# Patient Record
Sex: FEMALE | Race: BLACK OR AFRICAN AMERICAN | Employment: UNEMPLOYED | URBAN - METROPOLITAN AREA
[De-identification: names, ages, dates, MRNs, and addresses within clinical notes are randomized per-mention and may not be internally consistent; named-entity substitution may affect disease eponyms.]

---

## 2017-01-04 ENCOUNTER — OFFICE VISIT (OUTPATIENT)
Dept: INTERNAL MEDICINE CLINIC | Age: 57
End: 2017-01-04

## 2017-01-04 VITALS
WEIGHT: 190 LBS | RESPIRATION RATE: 16 BRPM | HEIGHT: 70 IN | HEART RATE: 84 BPM | TEMPERATURE: 97.9 F | DIASTOLIC BLOOD PRESSURE: 84 MMHG | SYSTOLIC BLOOD PRESSURE: 140 MMHG | BODY MASS INDEX: 27.2 KG/M2 | OXYGEN SATURATION: 94 %

## 2017-01-04 DIAGNOSIS — R60.9 PERIPHERAL EDEMA: ICD-10-CM

## 2017-01-04 DIAGNOSIS — L93.0 DISCOID LUPUS: ICD-10-CM

## 2017-01-04 DIAGNOSIS — M54.16 LUMBAR RADICULOPATHY, ACUTE: ICD-10-CM

## 2017-01-04 DIAGNOSIS — L03.116 CELLULITIS OF LEFT LOWER EXTREMITY: Primary | ICD-10-CM

## 2017-01-04 NOTE — MR AVS SNAPSHOT
Visit Information Date & Time Provider Department Dept. Phone Encounter #  
 1/4/2017  3:15 PM Aldo Sellers MD [address] [facility] [phone] [id number] Follow-up Instructions Return in about 4 weeks (around 2/1/2017). Follow-up and Disposition History Upcoming Health Maintenance Date Due INFLUENZA AGE 9 TO ADULT 8/1/2016 PAP AKA CERVICAL CYTOLOGY 10/16/2016 FOBT Q 1 YEAR AGE 50-75 2/2/2017 BREAST CANCER SCRN MAMMOGRAM 5/2/2018 DTaP/Tdap/Td series (2 - Td) 11/17/2025 Allergies as of 1/4/2017  Review Complete On: 1/4/2017 By: Aldo Sellers MD  
 No Known Allergies Current Immunizations  Never Reviewed Name Date Tdap 11/17/2015 Not reviewed this visit You Were Diagnosed With   
  
 Codes Comments Cellulitis of left lower extremity    -  Primary ICD-10-CM: L03.116 ICD-9-CM: 682.6 Lumbar radiculopathy, acute     ICD-10-CM: M54.16 
ICD-9-CM: 724.4 Peripheral edema     ICD-10-CM: R60.9 ICD-9-CM: 768. 3 Discoid lupus     ICD-10-CM: L93.0 ICD-9-CM: 695.4 Vitals BP Pulse Temp Resp Height(growth percentile) Weight(growth percentile) 140/84 84 97.9 °F (36.6 °C) (Oral) 16 5' 10\" (1.778 m) 190 lb (86.2 kg) SpO2 BMI OB Status Smoking Status 94% 27.26 kg/m2 Postmenopausal Former Smoker Vitals History BMI and BSA Data Body Mass Index Body Surface Area  
 27.26 kg/m 2 2.06 m 2 Preferred Pharmacy Pharmacy Name Phone [facility] [address] , [address] [place] [address] [phone] Your Updated Medication List  
  
   
This list is accurate as of: 1/4/17  4:52 PM.  Always use your most recent med list. amLODIPine 5 mg tablet Commonly known as:  Arlyss Nevin Take 1 Tab by mouth daily. amoxicillin-clavulanate 875-125 mg per tablet Commonly known as:  AUGMENTIN Take 1 Tab by mouth every twelve (12) hours. bumetanide 1 mg tablet Commonly known as:  Buelah Bake Take 1 Tab by mouth daily. clindamycin 300 mg capsule Commonly known as:  CLEOCIN Take 1 Cap by mouth three (3) times daily. cloNIDine HCl 0.1 mg tablet Commonly known as:  CATAPRES  
TAKE ONE TABLET BY MOUTH TWICE DAILY  
  
 cyclobenzaprine 10 mg tablet Commonly known as:  FLEXERIL Take 1 Tab by mouth two (2) times a day. fluticasone 50 mcg/actuation nasal spray Commonly known as:  Dylan Metro 2 Sprays by Both Nostrils route daily. gabapentin 100 mg capsule Commonly known as:  NEURONTIN Take 1 Cap by mouth three (3) times daily. hydroxychloroquine 200 mg tablet Commonly known as:  PLAQUENIL  
  
 hydrOXYzine HCl 50 mg tablet Commonly known as:  ATARAX Take 1 Tab by mouth every six (6) hours as needed for Itching. K-DUR 10 mEq tablet Generic drug:  potassium chloride Take  by mouth daily. loratadine 10 mg tablet Commonly known as:  Kia Chiquis Take 1 tablet by mouth daily. losartan 100 mg tablet Commonly known as:  COZAAR  
TAKE ONE TABLET BY MOUTH ONCE DAILY  
  
 naproxen 500 mg tablet Commonly known as:  NAPROSYN Take 1 Tab by mouth two (2) times daily (with meals). omeprazole 20 mg capsule Commonly known as:  PRILOSEC  
TAKE ONE CAPSULE BY MOUTH ONCE DAILY  
  
 VITAMIN D2 50,000 unit capsule Generic drug:  ergocalciferol Take 50,000 Units by mouth. Follow-up Instructions Return in about 4 weeks (around 2/1/2017). Patient Instructions Zilico Activation Thank you for requesting access to [employer]ilico. Please follow the instructions below to securely access and download your online medical record. Zilico allows you to send messages to your doctor, view your test results, renew your prescriptions, schedule appointments, and more. How Do I Sign Up? 1. In your internet browser, go to [email or web address] 
 2. Click on the First Time User? Click Here link in the Sign In box. You will be redirect to the New Member Sign Up page. 3. Enter your Xeebel Access Code exactly as it appears below. You will not need to use this code after youve completed the sign-up process. If you do not sign up before the expiration date, you must request a new code. MyChart Access Code: Activation code not generated Current Xeebel Status: Patient Declined (This is the date your MyChart access code will ) 4. Enter the last four digits of your Social Security Number (xxxx) and Date of Birth (mm/dd/yyyy) as indicated and click Submit. You will be taken to the next sign-up page. 5. Create a GlobalMotiont ID. This will be your Xeebel login ID and cannot be changed, so think of one that is secure and easy to remember. 6. Create a Xeebel password. You can change your password at any time. 7. Enter your Password Reset Question and Answer. This can be used at a later time if you forget your password. 8. Enter your e-mail address. You will receive e-mail notification when new information is available in [address] 9. Click Sign Up. You can now view and download portions of your medical record. 10. Click the Download Summary menu link to download a portable copy of your medical information. Additional Information If you have questions, please visit the Frequently Asked Questions section of the Xeebel website at [email or web address] Remember, Xeebel is NOT to be used for urgent needs. For medical emergencies, dial 911. Please provide this summary of care documentation to your next provider. Your primary care clinician is listed as Eduard Kingsley. If you have any questions after today's visit, please call [phone].

## 2017-01-04 NOTE — PATIENT INSTRUCTIONS
[email or web address] Activation    Thank you for requesting access to [employer]andbase. Please follow the instructions below to securely access and download your online medical record. Demandbase allows you to send messages to your doctor, view your test results, renew your prescriptions, schedule appointments, and more. How Do I Sign Up? 1. In your internet browser, go to [email or web address]  2. Click on the First Time User? Click Here link in the Sign In box. You will be redirect to the New Member Sign Up page. 3. Enter your Demandbase Access Code exactly as it appears below. You will not need to use this code after youve completed the sign-up process. If you do not sign up before the expiration date, you must request a new code. Demandbase Access Code: Activation code not generated  Current Demandbase Status: Patient Declined (This is the date your Demandbase access code will )    4. Enter the last four digits of your Social Security Number (xxxx) and Date of Birth (mm/dd/yyyy) as indicated and click Submit. You will be taken to the next sign-up page. 5. Create a Demandbase ID. This will be your Demandbase login ID and cannot be changed, so think of one that is secure and easy to remember. 6. Create a Demandbase password. You can change your password at any time. 7. Enter your Password Reset Question and Answer. This can be used at a later time if you forget your password. 8. Enter your e-mail address. You will receive e-mail notification when new information is available in [address] 9. Click Sign Up. You can now view and download portions of your medical record. 10. Click the Download Summary menu link to download a portable copy of your medical information. Additional Information    If you have questions, please visit the Frequently Asked Questions section of the Demandbase website at [email or web address] Remember, Demandbase is NOT to be used for urgent needs. For medical emergencies, dial 911.

## 2017-01-04 NOTE — PROGRESS NOTES
Checo Thorne is a 64 y.o. female and presents with Leg Swelling (bilateral)    Subjective:  She still has an occasional numbness in her rt. lower leg. she has been placed on multiple medications    She still has some pains involving the lt. lower leg with associated swelling reported to decrease some  She had her MRI of the lower spine that revealed a disc protrution at l4l5. And multilevel spinal stenosis    She has been treated for a cellulitis and is on medication    Hypertension Review:  The patient has hypertension  Diet and Lifestyle: generally follows a  low sodium diet, exercises sporadically  Home BP Monitoring: is not measured at home. Pertinent ROS: taking medications as instructed, no medication side effects noted, no TIA's, no chest pain on exertion, no dyspnea on exertion, no swelling of ankles. .    Review of Systems  Constitutional: negative for fevers, chills, anorexia and weight loss  Eyes:   negative for visual disturbance and irritation  ENT:   negative for tinnitus,sore throat,nasal congestion,ear pains. hoarseness  Respiratory:   cough,   CV:   negative for chest pain, palpitations, lower extremity edema  GI:   negative for nausea, vomiting, diarrhea, abdominal pain,melena  Endo:               negative for polyuria,polydipsia,polyphagia,heat intolerance  Genitourinary: negative for frequency, dysuria and hematuria  Integument:  negative for rash and pruritus  Hematologic:  negative for easy bruising and gum/nose bleeding  Musculoskel:  myalgias, arthralgias, back pain, , joint pain  Neurological:  negative for headaches, dizziness, vertigo, memory problems and gait   Behavl/Psych: negative for feelings of anxiety, depression, mood changes    Past Medical History   Diagnosis Date    Anemia, unspecified 2/21/2011    Arthritis, Degenerative,Knee 2/21/2011    Degenerative Disc Disease 2/21/2011    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 No pertinent past surgical history. Social History     Social History    Marital status: SINGLE     Spouse name: N/A    Number of children: N/A    Years of education: N/A     Social History Main Topics    Smoking status: Former Smoker    Smokeless tobacco: Never Used    Alcohol use 0.5 oz/week     1 Glasses of wine per week      Comment: SOCIALLY    Drug use: No    Sexual activity: No     Other Topics Concern    None     Social History Narrative     Family History   Problem Relation Age of Onset    Diabetes Mother     Hypertension Mother     Hypertension Father     Cancer Father      Current Outpatient Prescriptions   Medication Sig Dispense Refill    gabapentin (NEURONTIN) 100 mg capsule Take 1 Cap by mouth three (3) times daily. 90 Cap 3    fluticasone (FLONASE) 50 mcg/actuation nasal spray 2 Sprays by Both Nostrils route daily. 1 Bottle 12    amLODIPine (NORVASC) 5 mg tablet Take 1 Tab by mouth daily. 30 Tab 12    bumetanide (BUMEX) 1 mg tablet Take 1 Tab by mouth daily. 30 Tab 12    omeprazole (PRILOSEC) 20 mg capsule TAKE ONE CAPSULE BY MOUTH ONCE DAILY 30 Cap 6    naproxen (NAPROSYN) 500 mg tablet Take 1 Tab by mouth two (2) times daily (with meals). 60 Tab 3    losartan (COZAAR) 100 mg tablet TAKE ONE TABLET BY MOUTH ONCE DAILY 30 Tab 12    cloNIDine HCl (CATAPRES) 0.1 mg tablet TAKE ONE TABLET BY MOUTH TWICE DAILY 60 Tab 12    cyclobenzaprine (FLEXERIL) 10 mg tablet Take 1 Tab by mouth two (2) times a day. 60 Tab 12    ergocalciferol (VITAMIN D2) 50,000 unit capsule Take 50,000 Units by mouth.  hydroxychloroquine (PLAQUENIL) 200 mg tablet       loratadine (CLARITIN) 10 mg tablet Take 1 tablet by mouth daily. 30 tablet 12    potassium chloride (K-DUR) 10 mEq tablet Take  by mouth daily.       clindamycin (CLEOCIN) 300 mg capsule Take 1 Cap by mouth three (3) times daily. 21 Cap 0    amoxicillin-clavulanate (AUGMENTIN) 875-125 mg per tablet Take 1 Tab by mouth every twelve (12) hours. 14 Tab 4    hydrOXYzine (ATARAX) 50 mg tablet Take 1 Tab by mouth every six (6) hours as needed for Itching. 20 Tab 1     No Known Allergies    Objective:  Visit Vitals    /84    Pulse 84    Temp 97.9 °F (36.6 °C) (Oral)    Resp 16    Ht 5' 10\" (1.778 m)    Wt 190 lb (86.2 kg)    SpO2 94%    BMI 27.26 kg/m2     Physical Exam:   General appearance - alert, well appearing, and in mild distress  Mental status - alert, oriented to person, place, and time  EYE-KASEY, EOMI, corneas normal, no foreign bodies  ENT-ENT exam normal, no neck nodes or sinus tenderness  Nose - normal and patent, no erythema, discharge or polyps  Mouth - mucous membranes moist, pharynx normal without lesions  Neck - supple, no significant adenopathy   Chest - clear to auscultation, no wheezes, rales or rhonchi, symmetric air entry   Heart - normal rate, regular rhythm, normal S1, S2, no murmurs, rubs, clicks or gallops   Abdomen - soft, nontender, nondistended, no masses or organomegaly  Lymph- no adenopathy palpable  Ext-peripheral pulses normal, no pedal edema, no clubbing or cyanosis  Skin-Warm and dry. no hyperpigmentation, vitiligo, or suspicious lesions  Neuro -alert, oriented, normal speech, no focal findings or movement disorder noted  Neck-normal C-spine, no tenderness, full ROM without pain  Rt. lower leg hyperpigmentatioin,2+edema with erythema noted  Rt.knee-medial joint line tenderness noted  Back-tenderness lower lumbar spine and sacral spine      Results for orders placed or performed in visit on 74/29/25   METABOLIC PANEL, COMPREHENSIVE   Result Value Ref Range    Glucose 93 65 - 99 mg/dL    BUN 6 6 - 24 mg/dL    Creatinine 0.76 0.57 - 1.00 mg/dL    GFR est non-AA 88 >59 mL/min/1.73    GFR est  >59 mL/min/1.73    BUN/Creatinine ratio 8 (L) 9 - 23    Sodium 140 136 - 144 mmol/L    Potassium 3.8 3.5 - 5.2 mmol/L    Chloride 97 97 - 106 mmol/L    CO2 29 18 - 29 mmol/L    Calcium 9.0 8.7 - 10.2 mg/dL    Protein, total 9.1 (H) 6.0 - 8.5 g/dL    Albumin 3.8 3.5 - 5.5 g/dL    GLOBULIN, TOTAL 5.3 (H) 1.5 - 4.5 g/dL    A-G Ratio 0.7 (L) 1.1 - 2.5    Bilirubin, total 0.4 0.0 - 1.2 mg/dL    Alk. phosphatase 148 (H) 39 - 117 IU/L    AST 19 0 - 40 IU/L    ALT 12 0 - 32 IU/L   CBC W/O DIFF   Result Value Ref Range    WBC 4.8 3.4 - 10.8 x10E3/uL    RBC 3.63 (L) 3.77 - 5.28 x10E6/uL    HGB 10.0 (L) 11.1 - 15.9 g/dL    HCT 30.9 (L) 34.0 - 46.6 %    MCV 85 79 - 97 fL    MCH 27.5 26.6 - 33.0 pg    MCHC 32.4 31.5 - 35.7 g/dL    RDW 14.8 12.3 - 15.4 %    PLATELET 123 844 - 310 x10E3/uL   SPECIMEN STATUS REPORT   Result Value Ref Range    SPECIMEN STATUS REPORT COMMENT    PROTEIN ELECTROPHORESIS   Result Value Ref Range    Protein, total 9.0 (H) 6.0 - 8.5 g/dL    Albumin 3.2 2.9 - 4.4 g/dL    Alpha-1-globulin 0.3 0.0 - 0.4 g/dL    Alpha-2-globulin 1.0 0.4 - 1.0 g/dL    Beta globulin 1.5 (H) 0.7 - 1.3 g/dL    Gamma globulin 3.1 (H) 0.4 - 1.8 g/dL    M-spike Not Observed Not Observed g/dL    Globulin, total 5.8 (H) 2.2 - 3.9 g/dL    A/G ratio 0.6 (L) 0.7 - 1.7    Please note Comment    IRON PROFILE   Result Value Ref Range    TIBC 241 (L) 250 - 450 ug/dL    UIBC 189 131 - 425 ug/dL    Iron 52 27 - 159 ug/dL    Iron % saturation 22 15 - 55 %   VITAMIN B12 & FOLATE   Result Value Ref Range    Vitamin B12 678 211 - 946 pg/mL    Folate 14.5 >3.0 ng/mL   SPECIMEN STATUS REPORT   Result Value Ref Range    SPECIMEN STATUS REPORT COMMENT        Assessment/Plan:  No diagnosis found. No orders of the defined types were placed in this encounter. lose weight, follow low fat diet, follow low salt diet,Take 81mg aspirin daily  There are no Patient Instructions on file for this visit.    Follow-up Disposition: Not on File        Comments: none                  An After Visit Summary was printed and given to the patient.

## 2017-01-04 NOTE — LETTER
NOTIFICATION RETURN TO WORK / SCHOOL 
 
1/4/2017 4:49 PM 
 
Ms. [address] [facility] [phone] To Whom It May Concern: 
 
Griffin Rodriguez is currently under the care of [address] [place] [address]. She will not be able to return to work. If there are questions or concerns please have the patient contact our office. Sincerely, Ricci Avila MD

## 2017-02-01 ENCOUNTER — OFFICE VISIT (OUTPATIENT)
Dept: INTERNAL MEDICINE CLINIC | Age: 57
End: 2017-02-01

## 2017-02-01 VITALS
BODY MASS INDEX: 27.93 KG/M2 | WEIGHT: 195.1 LBS | RESPIRATION RATE: 14 BRPM | TEMPERATURE: 98.6 F | DIASTOLIC BLOOD PRESSURE: 90 MMHG | HEIGHT: 70 IN | HEART RATE: 84 BPM | SYSTOLIC BLOOD PRESSURE: 138 MMHG

## 2017-02-01 DIAGNOSIS — L93.0 DISCOID LUPUS: ICD-10-CM

## 2017-02-01 DIAGNOSIS — J20.9 ACUTE BRONCHITIS, UNSPECIFIED ORGANISM: ICD-10-CM

## 2017-02-01 DIAGNOSIS — L03.116 CELLULITIS OF LEFT LOWER EXTREMITY: Primary | ICD-10-CM

## 2017-02-01 DIAGNOSIS — M54.16 LUMBAR RADICULOPATHY, ACUTE: ICD-10-CM

## 2017-02-01 RX ORDER — POTASSIUM CHLORIDE 750 MG/1
20 TABLET, EXTENDED RELEASE ORAL DAILY
Qty: 30 TAB | Refills: 12 | Status: SHIPPED | OUTPATIENT
Start: 2017-02-01 | End: 2018-02-16 | Stop reason: SDUPTHER

## 2017-02-01 RX ORDER — AZITHROMYCIN 250 MG/1
TABLET, FILM COATED ORAL
Qty: 6 TAB | Refills: 0 | Status: SHIPPED | OUTPATIENT
Start: 2017-02-01 | End: 2017-03-30 | Stop reason: ALTCHOICE

## 2017-02-01 NOTE — PROGRESS NOTES
1. Have you been to the ER, urgent care clinic since your last visit? Hospitalized since your last visit? No    2. Have you seen or consulted any other health care providers outside of the [address] [place] [address] since your last visit? Include any pap smears or colon screening.  No     Chief Complaint   Patient presents with    Skin Infection     follow up on cellulitis on both legs     Not fasting

## 2017-02-01 NOTE — PROGRESS NOTES
Archie Leon is a 64 y.o. female and presents with Skin Infection (follow up on cellulitis on both legs)    Subjective:  Upper respiratory infection Review:  Archie Leon is a 64 y.o. female who complains of nasal congestion,sore throat, productive cough, myalgias and headache for the past few days, gradually worsening since that time. She denies a history of shortness of breath. Evaluation to date: none. Treatment to date: decongestants, antihistamines, cough suppressants, OTC products. Patient does not smoke cigarettes. Relevant PMH: No pertinent additional PMH. She still has less  pains involving the lt. lower leg   She had her MRI of the lower spine that revealed a disc protrution at l4l5. And multilevel spinal stenosis    She has been treated for a cellulitis and is on medication and has seen some relief    Hypertension Review:  The patient has hypertension  Diet and Lifestyle: generally follows a  low sodium diet, exercises sporadically  Home BP Monitoring: is not measured at home. Pertinent ROS: taking medications as instructed, no medication side effects noted, no TIA's, no chest pain on exertion, no dyspnea on exertion, no swelling of ankles. .    Review of Systems  Constitutional: negative for fevers, chills, anorexia and weight loss  Eyes:   negative for visual disturbance and irritation  ENT:   negative for tinnitus,sore throat,nasal congestion,ear pains. hoarseness  Respiratory:   cough,   CV:   negative for chest pain, palpitations, lower extremity edema  GI:   negative for nausea, vomiting, diarrhea, abdominal pain,melena  Endo:               negative for polyuria,polydipsia,polyphagia,heat intolerance  Genitourinary: negative for frequency, dysuria and hematuria  Integument:  negative for rash and pruritus  Hematologic:  negative for easy bruising and gum/nose bleeding  Musculoskel:  myalgias, arthralgias, back pain, , joint pain  Neurological:  negative for headaches, dizziness, vertigo, memory problems and gait   Behavl/Psych: negative for feelings of anxiety, depression, mood changes    Past Medical History   Diagnosis Date    Anemia, unspecified 2/21/2011    Arthritis, Degenerative,Knee 2/21/2011    Degenerative Disc Disease 2/21/2011    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 No pertinent past surgical history. Social History     Social History    Marital status: SINGLE     Spouse name: N/A    Number of children: N/A    Years of education: N/A     Social History Main Topics    Smoking status: Former Smoker    Smokeless tobacco: Never Used    Alcohol use 0.5 oz/week     1 Glasses of wine per week      Comment: SOCIALLY    Drug use: No    Sexual activity: No     Other Topics Concern    None     Social History Narrative     Family History   Problem Relation Age of Onset    Diabetes Mother     Hypertension Mother     Hypertension Father     Cancer Father      Current Outpatient Prescriptions   Medication Sig Dispense Refill    potassium chloride (K-DUR, KLOR-CON) 10 mEq tablet Take 2 Tabs by mouth daily. 30 Tab 12    azithromycin (ZITHROMAX) 250 mg tablet 2 tabs today and then 1 tab daily for 4 days 6 Tab 0    gabapentin (NEURONTIN) 100 mg capsule Take 1 Cap by mouth three (3) times daily. 90 Cap 3    fluticasone (FLONASE) 50 mcg/actuation nasal spray 2 Sprays by Both Nostrils route daily. 1 Bottle 12    amLODIPine (NORVASC) 5 mg tablet Take 1 Tab by mouth daily. 30 Tab 12    bumetanide (BUMEX) 1 mg tablet Take 1 Tab by mouth daily. 30 Tab 12    naproxen (NAPROSYN) 500 mg tablet Take 1 Tab by mouth two (2) times daily (with meals).  60 Tab 3    losartan (COZAAR) 100 mg tablet TAKE ONE TABLET BY MOUTH ONCE DAILY 30 Tab 12    cloNIDine HCl (CATAPRES) 0.1 mg tablet TAKE ONE TABLET BY MOUTH TWICE DAILY 60 Tab 12    cyclobenzaprine (FLEXERIL) 10 mg tablet Take 1 Tab by mouth two (2) times a day. 60 Tab 12    ergocalciferol (VITAMIN D2) 50,000 unit capsule Take 50,000 Units by mouth.  hydroxychloroquine (PLAQUENIL) 200 mg tablet       loratadine (CLARITIN) 10 mg tablet Take 1 tablet by mouth daily. 30 tablet 12    potassium chloride (K-DUR) 10 mEq tablet Take  by mouth daily. No Known Allergies    Objective:  Visit Vitals    /90 (BP 1 Location: Left arm, BP Patient Position: At rest)    Pulse 84    Temp 98.6 °F (37 °C) (Oral)    Resp 14    Ht 5' 10\" (1.778 m)    Wt 195 lb 1.6 oz (88.5 kg)    BMI 27.99 kg/m2     Physical Exam:   General appearance - alert, well appearing, and in mild distress  Mental status - alert, oriented to person, place, and time  EYE-KASEY, EOMI, corneas normal, no foreign bodies  ENT-ENT exam normal, no neck nodes or sinus tenderness  Nose - normal and patent, no erythema, discharge or polyps  Mouth - mucous membranes moist, pharynx normal without lesions  Neck - supple, no significant adenopathy   Chest - clear to auscultation, no wheezes, rales or rhonchi, symmetric air entry   Heart - normal rate, regular rhythm, normal S1, S2, no murmurs, rubs, clicks or gallops   Abdomen - soft, nontender, nondistended, no masses or organomegaly  Lymph- no adenopathy palpable  Ext-peripheral pulses normal, no pedal edema, no clubbing or cyanosis  Skin-Warm and dry. no hyperpigmentation, vitiligo, or suspicious lesions  Neuro -alert, oriented, normal speech, no focal findings or movement disorder noted  Neck-normal C-spine, no tenderness, full ROM without pain  Rt. lower leg hyperpigmentatioin,2+edema with erythema noted  Rt.knee-medial joint line tenderness noted  Back-tenderness lower lumbar spine and sacral spine      Results for orders placed or performed in visit on 50/30/91   METABOLIC PANEL, COMPREHENSIVE   Result Value Ref Range    Glucose 93 65 - 99 mg/dL    BUN 6 6 - 24 mg/dL    Creatinine 0.76 0.57 - 1.00 mg/dL    GFR est non-AA 88 >59 mL/min/1.73    GFR est  >59 mL/min/1.73    BUN/Creatinine ratio 8 (L) 9 - 23    Sodium 140 136 - 144 mmol/L    Potassium 3.8 3.5 - 5.2 mmol/L    Chloride 97 97 - 106 mmol/L    CO2 29 18 - 29 mmol/L    Calcium 9.0 8.7 - 10.2 mg/dL    Protein, total 9.1 (H) 6.0 - 8.5 g/dL    Albumin 3.8 3.5 - 5.5 g/dL    GLOBULIN, TOTAL 5.3 (H) 1.5 - 4.5 g/dL    A-G Ratio 0.7 (L) 1.1 - 2.5    Bilirubin, total 0.4 0.0 - 1.2 mg/dL    Alk. phosphatase 148 (H) 39 - 117 IU/L    AST (SGOT) 19 0 - 40 IU/L    ALT (SGPT) 12 0 - 32 IU/L   CBC W/O DIFF   Result Value Ref Range    WBC 4.8 3.4 - 10.8 x10E3/uL    RBC 3.63 (L) 3.77 - 5.28 x10E6/uL    HGB 10.0 (L) 11.1 - 15.9 g/dL    HCT 30.9 (L) 34.0 - 46.6 %    MCV 85 79 - 97 fL    MCH 27.5 26.6 - 33.0 pg    MCHC 32.4 31.5 - 35.7 g/dL    RDW 14.8 12.3 - 15.4 %    PLATELET 537 033 - 900 x10E3/uL   SPECIMEN STATUS REPORT   Result Value Ref Range    SPECIMEN STATUS REPORT COMMENT    PROTEIN ELECTROPHORESIS   Result Value Ref Range    Protein, total 9.0 (H) 6.0 - 8.5 g/dL    Albumin 3.2 2.9 - 4.4 g/dL    Alpha-1-globulin 0.3 0.0 - 0.4 g/dL    ALPHA-2 GLOBULIN 1.0 0.4 - 1.0 g/dL    Beta globulin 1.5 (H) 0.7 - 1.3 g/dL    Gamma globulin 3.1 (H) 0.4 - 1.8 g/dL    M-spike Not Observed Not Observed g/dL    Globulin, total 5.8 (H) 2.2 - 3.9 g/dL    A/G ratio 0.6 (L) 0.7 - 1.7    Please note Comment    IRON PROFILE   Result Value Ref Range    TIBC 241 (L) 250 - 450 ug/dL    UIBC 189 131 - 425 ug/dL    Iron 52 27 - 159 ug/dL    Iron % saturation 22 15 - 55 %   VITAMIN B12 & FOLATE   Result Value Ref Range    Vitamin B12 678 211 - 946 pg/mL    Folate 14.5 >3.0 ng/mL   SPECIMEN STATUS REPORT   Result Value Ref Range    SPECIMEN STATUS REPORT COMMENT        Assessment/Plan:    ICD-10-CM ICD-9-CM    1. Cellulitis of left lower extremity L03.116 682.6    2.  Lumbar radiculopathy, acute M54.16 724.4    3. 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 Acute bronchitis, unspecified organism J20.9 466.0      Orders Placed This Encounter    potassium chloride (K-DUR, KLOR-CON) 10 mEq tablet     Sig: Take 2 Tabs by mouth daily. Dispense:  30 Tab     Refill:  12    azithromycin (ZITHROMAX) 250 mg tablet     Si tabs today and then 1 tab daily for 4 days     Dispense:  6 Tab     Refill:  0     lose weight, follow low fat diet, follow low salt diet,Take 81mg aspirin daily  Patient Instructions   MyChart Activation    Thank you for requesting access to Behance. Please follow the instructions below to securely access and download your online medical record. Behance allows you to send messages to your doctor, view your test results, renew your prescriptions, schedule appointments, and more. How Do I Sign Up? 1. In your internet browser, go to [email or web address]  2. Click on the First Time User? Click Here link in the Sign In box. You will be redirect to the New Member Sign Up page. 3. Enter your Behance Access Code exactly as it appears below. You will not need to use this code after youve completed the sign-up process. If you do not sign up before the expiration date, you must request a new code. Behance Access Code: Activation code not generated  Current Behance Status: Patient Declined (This is the date your The Editorialistt access code will )    4. Enter the last four digits of your Social Security Number (xxxx) and Date of Birth (mm/dd/yyyy) as indicated and click Submit. You will be taken to the next sign-up page. 5. Create a Behance ID. This will be your Behance login ID and cannot be changed, so think of one that is secure and easy to remember. 6. Create a Behance password. You can change your password at any time. 7. Enter your Password Reset Question and Answer. This can be used at a later time if you forget your password. 8. Enter your e-mail address.  You will receive e-mail notification when new information is available in [email or web address]. 9. Click Sign Up. You can now view and download portions of your medical record. 10. Click the Download Summary menu link to download a portable copy of your medical information. Additional Information    If you have questions, please visit the Frequently Asked Questions section of the ScriptPad website at [email or web address] Remember, ScriptPad is NOT to be used for urgent needs. For medical emergencies, dial 911. Follow-up Disposition:  Return in about 3 months (around 5/1/2017), or if symptoms worsen or fail to improve. Comments: none                  An After Visit Summary was printed and given to the patient.

## 2017-02-01 NOTE — MR AVS SNAPSHOT
Visit Information Date & Time Provider Department Dept. Phone Encounter #  
 2/1/2017  3:30 PM Anusha Fofana MD [facility] [address] [facility] [phone] [id number] Follow-up Instructions Return in about 3 months (around 5/1/2017), or if symptoms worsen or fail to improve. Follow-up and Disposition History Upcoming Health Maintenance Date Due  
 PAP AKA CERVICAL CYTOLOGY 10/16/2016 FOBT Q 1 YEAR AGE 50-75 2/2/2017 BREAST CANCER SCRN MAMMOGRAM 5/2/2018 DTaP/Tdap/Td series (2 - Td) 11/17/2025 Allergies as of 2/1/2017  Review Complete On: 2/1/2017 By: Anusha Fofana MD  
 No Known Allergies Current Immunizations  Never Reviewed Name Date Influenza Nasal Vaccine 1/3/2017 Tdap 11/17/2015 Not reviewed this visit You Were Diagnosed With   
  
 Codes Comments Cellulitis of left lower extremity    -  Primary ICD-10-CM: L03.116 ICD-9-CM: 682.6 Lumbar radiculopathy, acute     ICD-10-CM: M54.16 
ICD-9-CM: 724.4 Discoid lupus     ICD-10-CM: L93.0 ICD-9-CM: 695.4 Acute bronchitis, unspecified organism     ICD-10-CM: J20.9 ICD-9-CM: 466.0 Vitals BP Pulse Temp Resp Height(growth percentile) Weight(growth percentile) 138/90 (BP 1 Location: Left arm, BP Patient Position: At rest) 84 98.6 °F (37 °C) (Oral) 14 5' 10\" (1.778 m) 195 lb 1.6 oz (88.5 kg) BMI OB Status Smoking Status 27.99 kg/m2 Postmenopausal Former Smoker Vitals History BMI and BSA Data Body Mass Index Body Surface Area  
 27.99 kg/m 2 2.09 m 2 Preferred Pharmacy Pharmacy Name Phone [facility] [address] , [address] [place] [address] [phone] Your Updated Medication List  
  
   
This list is accurate as of: 2/1/17  4:36 PM.  Always use your most recent med list. amLODIPine 5 mg tablet Commonly known as:  Franklyn Presser Take 1 Tab by mouth daily. azithromycin 250 mg tablet Commonly known as:  Stanislaus Sleight 2 tabs today and then 1 tab daily for 4 days  
  
 bumetanide 1 mg tablet Commonly known as:  Arletta Kaia Take 1 Tab by mouth daily. cloNIDine HCl 0.1 mg tablet Commonly known as:  CATAPRES  
TAKE ONE TABLET BY MOUTH TWICE DAILY  
  
 cyclobenzaprine 10 mg tablet Commonly known as:  FLEXERIL Take 1 Tab by mouth two (2) times a day. fluticasone 50 mcg/actuation nasal spray Commonly known as:  Lennice Boss 2 Sprays by Both Nostrils route daily. gabapentin 100 mg capsule Commonly known as:  NEURONTIN Take 1 Cap by mouth three (3) times daily. hydroxychloroquine 200 mg tablet Commonly known as:  PLAQUENIL  
  
 * K-DUR 10 mEq tablet Generic drug:  potassium chloride Take  by mouth daily. * potassium chloride 10 mEq tablet Commonly known as:  K-DUR, KLOR-CON Take 2 Tabs by mouth daily. loratadine 10 mg tablet Commonly known as:  Shannan Elmdale Take 1 tablet by mouth daily. losartan 100 mg tablet Commonly known as:  COZAAR  
TAKE ONE TABLET BY MOUTH ONCE DAILY  
  
 naproxen 500 mg tablet Commonly known as:  NAPROSYN Take 1 Tab by mouth two (2) times daily (with meals). VITAMIN D2 50,000 unit capsule Generic drug:  ergocalciferol Take 50,000 Units by mouth. * Notice: This list has 2 medication(s) that are the same as other medications prescribed for you. Read the directions carefully, and ask your doctor or other care provider to review them with you. Prescriptions Sent to Pharmacy Refills  
 potassium chloride (K-DUR, KLOR-CON) 10 mEq tablet 12 Sig: Take 2 Tabs by mouth daily. Class: Normal  
 Pharmacy: [address], [address] [place] [address] Ph #: [phone] Route: Oral  
 azithromycin (ZITHROMAX) 250 mg tablet 0 Si tabs today and then 1 tab daily for 4 days  Class: Normal  
 Pharmacy: [place] [id number] [facility], [address] [place] [address] #: [phone] Follow-up Instructions Return in about 3 months (around 2017), or if symptoms worsen or fail to improve. Patient Instructions MyChart Activation Thank you for requesting access to scenios. Please follow the instructions below to securely access and download your online medical record. scenios allows you to send messages to your doctor, view your test results, renew your prescriptions, schedule appointments, and more. How Do I Sign Up? 1. In your internet browser, go to [email or web address] 
2. Click on the First Time User? Click Here link in the Sign In box. You will be redirect to the New Member Sign Up page. 3. Enter your scenios Access Code exactly as it appears below. You will not need to use this code after youve completed the sign-up process. If you do not sign up before the expiration date, you must request a new code. scenios Access Code: Activation code not generated Current scenios Status: Patient Declined (This is the date your scenios access code will ) 4. Enter the last four digits of your Social Security Number (xxxx) and Date of Birth (mm/dd/yyyy) as indicated and click Submit. You will be taken to the next sign-up page. 5. Create a scenios ID. This will be your scenios login ID and cannot be changed, so think of one that is secure and easy to remember. 6. Create a scenios password. You can change your password at any time. 7. Enter your Password Reset Question and Answer. This can be used at a later time if you forget your password. 8. Enter your e-mail address. You will receive e-mail notification when new information is available in [address] 9. Click Sign Up. You can now view and download portions of your medical record. 10. Click the Download Summary menu link to download a portable copy of your medical information. Additional Information If you have questions, please visit the Frequently Asked Questions section of the HALSCIONhart website at [email or web address] Remember, OM Latamt is NOT to be used for urgent needs. For medical emergencies, dial 911. Please provide this summary of care documentation to your next provider. Your primary care clinician is listed as Gale Show. If you have any questions after today's visit, please call [phone].

## 2017-02-01 NOTE — PATIENT INSTRUCTIONS
CBRITEharFive Below Activation    Thank you for requesting access to Flashtalking. Please follow the instructions below to securely access and download your online medical record. Flashtalking allows you to send messages to your doctor, view your test results, renew your prescriptions, schedule appointments, and more. How Do I Sign Up? 1. In your internet browser, go to [email or web address]  2. Click on the First Time User? Click Here link in the Sign In box. You will be redirect to the New Member Sign Up page. 3. Enter your Flashtalking Access Code exactly as it appears below. You will not need to use this code after youve completed the sign-up process. If you do not sign up before the expiration date, you must request a new code. Flashtalking Access Code: Activation code not generated  Current Flashtalking Status: Patient Declined (This is the date your Flashtalking access code will )    4. Enter the last four digits of your Social Security Number (xxxx) and Date of Birth (mm/dd/yyyy) as indicated and click Submit. You will be taken to the next sign-up page. 5. Create a Flashtalking ID. This will be your Flashtalking login ID and cannot be changed, so think of one that is secure and easy to remember. 6. Create a Flashtalking password. You can change your password at any time. 7. Enter your Password Reset Question and Answer. This can be used at a later time if you forget your password. 8. Enter your e-mail address. You will receive e-mail notification when new information is available in [address] 9. Click Sign Up. You can now view and download portions of your medical record. 10. Click the Download Summary menu link to download a portable copy of your medical information. Additional Information    If you have questions, please visit the Frequently Asked Questions section of the Flashtalking website at [email or web address] Remember, Flashtalking is NOT to be used for urgent needs. For medical emergencies, dial 911.

## 2017-03-30 ENCOUNTER — OFFICE VISIT (OUTPATIENT)
Dept: INTERNAL MEDICINE CLINIC | Age: 57
End: 2017-03-30

## 2017-03-30 VITALS
BODY MASS INDEX: 27.92 KG/M2 | RESPIRATION RATE: 20 BRPM | HEIGHT: 70 IN | OXYGEN SATURATION: 99 % | TEMPERATURE: 98.6 F | SYSTOLIC BLOOD PRESSURE: 130 MMHG | WEIGHT: 195 LBS | HEART RATE: 88 BPM | DIASTOLIC BLOOD PRESSURE: 70 MMHG

## 2017-03-30 DIAGNOSIS — L93.0 DISCOID LUPUS: ICD-10-CM

## 2017-03-30 DIAGNOSIS — M48.061 SPINAL STENOSIS OF LUMBAR REGION: ICD-10-CM

## 2017-03-30 DIAGNOSIS — M54.16 LUMBAR RADICULOPATHY: Primary | ICD-10-CM

## 2017-03-30 DIAGNOSIS — M50.30 DEGENERATIVE DISC DISEASE, CERVICAL: ICD-10-CM

## 2017-03-30 DIAGNOSIS — M50.30 DEGENERATION OF CERVICAL INTERVERTEBRAL DISC: ICD-10-CM

## 2017-03-30 DIAGNOSIS — I10 ESSENTIAL HYPERTENSION, BENIGN: ICD-10-CM

## 2017-03-30 RX ORDER — LOSARTAN POTASSIUM 100 MG/1
TABLET ORAL
Qty: 30 TAB | Refills: 12 | Status: SHIPPED | OUTPATIENT
Start: 2017-03-30 | End: 2018-04-02 | Stop reason: SDUPTHER

## 2017-03-30 RX ORDER — PREDNISONE 10 MG/1
TABLET ORAL
Qty: 21 TAB | Refills: 0 | Status: SHIPPED | OUTPATIENT
Start: 2017-03-30 | End: 2018-01-29

## 2017-03-30 RX ORDER — GABAPENTIN 300 MG/1
300 CAPSULE ORAL 3 TIMES DAILY
Qty: 90 CAP | Refills: 12 | Status: SHIPPED | OUTPATIENT
Start: 2017-03-30 | End: 2019-06-12

## 2017-03-30 RX ORDER — CYCLOBENZAPRINE HCL 10 MG
10 TABLET ORAL 2 TIMES DAILY
Qty: 60 TAB | Refills: 12 | Status: SHIPPED | OUTPATIENT
Start: 2017-03-30 | End: 2018-04-02 | Stop reason: SDUPTHER

## 2017-03-30 RX ORDER — NAPROXEN 500 MG/1
500 TABLET ORAL 2 TIMES DAILY WITH MEALS
Qty: 60 TAB | Refills: 3 | Status: SHIPPED | OUTPATIENT
Start: 2017-03-30 | End: 2017-08-23 | Stop reason: SDUPTHER

## 2017-03-30 RX ORDER — POTASSIUM CHLORIDE 750 MG/1
20 TABLET, EXTENDED RELEASE ORAL DAILY
Qty: 30 TAB | Refills: 12 | Status: SHIPPED | OUTPATIENT
Start: 2017-03-30 | End: 2018-11-19 | Stop reason: ALTCHOICE

## 2017-03-30 NOTE — PATIENT INSTRUCTIONS
PTS [email or web address] Activation    Thank you for requesting access to Holidu. Please follow the instructions below to securely access and download your online medical record. Holidu allows you to send messages to your doctor, view your test results, renew your prescriptions, schedule appointments, and more. How Do I Sign Up? 1. In your internet browser, go to [email or web address]  2. Click on the First Time User? Click Here link in the Sign In box. You will be redirect to the New Member Sign Up page. 3. Enter your Holidu Access Code exactly as it appears below. You will not need to use this code after youve completed the sign-up process. If you do not sign up before the expiration date, you must request a new code. Holidu Access Code: Activation code not generated  Current Holidu Status: Patient Declined (This is the date your Holidu access code will )    4. Enter the last four digits of your Social Security Number (xxxx) and Date of Birth (mm/dd/yyyy) as indicated and click Submit. You will be taken to the next sign-up page. 5. Create a Holidu ID. This will be your Holidu login ID and cannot be changed, so think of one that is secure and easy to remember. 6. Create a Holidu password. You can change your password at any time. 7. Enter your Password Reset Question and Answer. This can be used at a later time if you forget your password. 8. Enter your e-mail address. You will receive e-mail notification when new information is available in [address] 9. Click Sign Up. You can now view and download portions of your medical record. 10. Click the Download Summary menu link to download a portable copy of your medical information. Additional Information    If you have questions, please visit the Frequently Asked Questions section of the Holidu website at [email or web address] Remember, Holidu is NOT to be used for urgent needs. For medical emergencies, dial 911.

## 2017-03-30 NOTE — MR AVS SNAPSHOT
Visit Information Date & Time Provider Department Dept. Phone Encounter #  
 3/30/2017  2:45 PM Brian Azar MD [facility] [phone] [id number] Follow-up Instructions Return in about 4 weeks (around 4/27/2017), or if symptoms worsen or fail to improve. Follow-up and Disposition History Your Appointments 5/3/2017  3:30 PM  
ROUTINE CARE with Brian Azar MD  
[facility] - Kaila Winter ([facility]) Appt Note: 3 month check up [address] [facility],[address] [facility] 7 [id number]  
[phone]  
  
   
 [address] [facility],[address] [place] [phone] Upcoming Health Maintenance Date Due  
 PAP AKA CERVICAL CYTOLOGY 10/16/2016 FOBT Q 1 YEAR AGE 50-75 2/2/2017 BREAST CANCER SCRN MAMMOGRAM 5/2/2018 DTaP/Tdap/Td series (2 - Td) 11/17/2025 Allergies as of 3/30/2017  Review Complete On: 3/30/2017 By: Belkis Alonzo LPN No Known Allergies Current Immunizations  Never Reviewed Name Date Influenza Nasal Vaccine 1/3/2017 Tdap 11/17/2015 Not reviewed this visit You Were Diagnosed With   
  
 Codes Comments Lumbar radiculopathy    -  Primary ICD-10-CM: M54.16 
ICD-9-CM: 724.4 Degenerative disc disease, cervical     ICD-10-CM: M50.30 ICD-9-CM: 722.4 Degeneration of cervical intervertebral disc     ICD-10-CM: M50.30 ICD-9-CM: 722.4 Spinal stenosis of lumbar region     ICD-10-CM: M48.06 
ICD-9-CM: 724.02 Discoid lupus     ICD-10-CM: L93.0 ICD-9-CM: 695.4 Essential hypertension, benign     ICD-10-CM: I10 
ICD-9-CM: 401.1 Vitals BP Pulse Temp Resp Height(growth percentile) Weight(growth percentile) 130/70 88 98.6 °F (37 °C) (Oral) 20 5' 10\" (1.778 m) 195 lb (88.5 kg) SpO2 BMI OB Status Smoking Status 99% 27.98 kg/m2 Postmenopausal Former Smoker Vitals History BMI and BSA Data Body Mass Index Body Surface Area 27.98 kg/m 2 2.09 m 2 Preferred Pharmacy Pharmacy Name Phone [facility] [address] [phone] Your Updated Medication List  
  
   
This list is accurate as of: 3/30/17  4:05 PM.  Always use your most recent med list. amLODIPine 5 mg tablet Commonly known as:  Wandra Cara Take 1 Tab by mouth daily. bumetanide 1 mg tablet Commonly known as:  Barnie Pulse Take 1 Tab by mouth daily. cloNIDine HCl 0.1 mg tablet Commonly known as:  CATAPRES  
TAKE ONE TABLET BY MOUTH TWICE DAILY  
  
 cyclobenzaprine 10 mg tablet Commonly known as:  FLEXERIL Take 1 Tab by mouth two (2) times a day. fluticasone 50 mcg/actuation nasal spray Commonly known as:  Filbert Chard 2 Sprays by Both Nostrils route daily. gabapentin 300 mg capsule Commonly known as:  NEURONTIN Take 1 Cap by mouth three (3) times daily. hydroxychloroquine 200 mg tablet Commonly known as:  PLAQUENIL  
  
 * K-DUR 10 mEq tablet Generic drug:  potassium chloride Take  by mouth daily. * potassium chloride 10 mEq tablet Commonly known as:  K-DUR, KLOR-CON Take 2 Tabs by mouth daily. loratadine 10 mg tablet Commonly known as:  Katheryn Delarosa Take 1 tablet by mouth daily. losartan 100 mg tablet Commonly known as:  COZAAR  
TAKE ONE TABLET BY MOUTH ONCE DAILY  
  
 naproxen 500 mg tablet Commonly known as:  NAPROSYN Take 1 Tab by mouth two (2) times daily (with meals). predniSONE 10 mg tablet Commonly known as:  DELTASONE  
6 tabs today and reduce by 1 tab daily VITAMIN D2 50,000 unit capsule Generic drug:  ergocalciferol Take 50,000 Units by mouth. * Notice: This list has 2 medication(s) that are the same as other medications prescribed for you. Read the directions carefully, and ask your doctor or other care provider to review them with you. Prescriptions Sent to Pharmacy Refills  
 gabapentin (NEURONTIN) 300 mg capsule 12 Sig: Take 1 Cap by mouth three (3) times daily. Class: Normal  
 Pharmacy: [facility] [address], [address] [place] [address] Road Ph #: [phone] Route: Oral  
 cyclobenzaprine (FLEXERIL) 10 mg tablet 12 Sig: Take 1 Tab by mouth two (2) times a day. Class: Normal  
 Pharmacy: [facility] [address], [address] [place] [address] Road Ph #: [phone] Route: Oral  
 naproxen (NAPROSYN) 500 mg tablet 3 Sig: Take 1 Tab by mouth two (2) times daily (with meals). Class: Normal  
 Pharmacy: [facility] [address], [address] [place] [address] Road Ph #: [phone] Route: Oral  
 losartan (COZAAR) 100 mg tablet 12 Sig: TAKE ONE TABLET BY MOUTH ONCE DAILY Class: Normal  
 Pharmacy: [facility] [address], [address] [place] [address] Road Ph #: [phone]  
 predniSONE (DELTASONE) 10 mg tablet 0 Si tabs today and reduce by 1 tab daily Class: Normal  
 Pharmacy: [facility] [address], [address] [place] [address] Road Ph #: [phone] Follow-up Instructions Return in about 4 weeks (around 2017), or if symptoms worsen or fail to improve. To-Do List   
 2017 Imaging:  IR INJ FORAMIN EPID LUMB ANES/STER SNGL Patient Instructions Campalyst Activation Thank you for requesting access to Campalyst. Please follow the instructions below to securely access and download your online medical record. Campalyst allows you to send messages to your doctor, view your test results, renew your prescriptions, schedule appointments, and more. How Do I Sign Up? 1. In your internet browser, go to [email or web address] 
2. Click on the First Time User? Click Here link in the Sign In box. You will be redirect to the New Member Sign Up page. 3. Enter your Campalyst Access Code exactly as it appears below.  You will not need to use this code after youve completed the sign-up process. If you do not sign up before the expiration date, you must request a new code. Inktankhart Access Code: Activation code not generated Current SHERPA assistant Status: Patient Declined (This is the date your Netpulset access code will ) 4. Enter the last four digits of your Social Security Number (xxxx) and Date of Birth (mm/dd/yyyy) as indicated and click Submit. You will be taken to the next sign-up page. 5. Create a Netpulset ID. This will be your SHERPA assistant login ID and cannot be changed, so think of one that is secure and easy to remember. 6. Create a SHERPA assistant password. You can change your password at any time. 7. Enter your Password Reset Question and Answer. This can be used at a later time if you forget your password. 8. Enter your e-mail address. You will receive e-mail notification when new information is available in [address]. 9. Click Sign Up. You can now view and download portions of your medical record. 10. Click the Download Summary menu link to download a portable copy of your medical information. Additional Information If you have questions, please visit the Frequently Asked Questions section of the SHERPA assistant website at [email or web address] Remember, SHERPA assistant is NOT to be used for urgent needs. For medical emergencies, dial 911. Please provide this summary of care documentation to your next provider. Your primary care clinician is listed as Lori Ledesma. If you have any questions after today's visit, please call [phone].

## 2017-03-30 NOTE — PROGRESS NOTES
Brian Singh is a 64 y.o. female and presents with Leg Pain    Subjective:  She recurrent pains involving the lt. lower leg with associated numbness involving the lt. thigh ,states the pains are intense  She had her MRI of the lower spine that revealed a disc protrution at l4l5. And multilevel spinal stenosis  She has seen neurosurgery and an injection was suggested. Hypertension Review:  The patient has hypertension  Diet and Lifestyle: generally follows a  low sodium diet, exercises sporadically  Home BP Monitoring: is not measured at home. Pertinent ROS: taking medications as instructed, no medication side effects noted, no TIA's, no chest pain on exertion, no dyspnea on exertion, no swelling of ankles. She has recurrent cellulitis of the leg. Review of Systems  Constitutional: negative for fevers, chills, anorexia and weight loss  Eyes:   negative for visual disturbance and irritation  ENT:   negative for tinnitus,sore throat,nasal congestion,ear pains. hoarseness  Respiratory:   cough,   CV:   negative for chest pain, palpitations, lower extremity edema  GI:   negative for nausea, vomiting, diarrhea, abdominal pain,melena  Endo:               negative for polyuria,polydipsia,polyphagia,heat intolerance  Genitourinary: negative for frequency, dysuria and hematuria  Integument:  negative for rash and pruritus  Hematologic:  negative for easy bruising and gum/nose bleeding  Musculoskel:  myalgias, arthralgias, back pain, , joint pain  Neurological:  negative for headaches, dizziness, vertigo, memory problems and gait   Behavl/Psych: negative for feelings of anxiety, depression, mood changes    Past Medical History:   Diagnosis Date    Anemia, unspecified 2/21/2011    Arthritis, Degenerative,Knee 2/21/2011    Degenerative Disc Disease 2/21/2011    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 No pertinent surgical history. Social History     Social History    Marital status: SINGLE     Spouse name: N/A    Number of children: N/A    Years of education: N/A     Social History Main Topics    Smoking status: Former Smoker    Smokeless tobacco: Never Used    Alcohol use 0.5 oz/week     1 Glasses of wine per week      Comment: SOCIALLY    Drug use: No    Sexual activity: No     Other Topics Concern    None     Social History Narrative     Family History   Problem Relation Age of Onset    Diabetes Mother     Hypertension Mother     Hypertension Father     Cancer Father      Current Outpatient Prescriptions   Medication Sig Dispense Refill    gabapentin (NEURONTIN) 300 mg capsule Take 1 Cap by mouth three (3) times daily. 90 Cap 12    cyclobenzaprine (FLEXERIL) 10 mg tablet Take 1 Tab by mouth two (2) times a day. 60 Tab 12    naproxen (NAPROSYN) 500 mg tablet Take 1 Tab by mouth two (2) times daily (with meals). 60 Tab 3    losartan (COZAAR) 100 mg tablet TAKE ONE TABLET BY MOUTH ONCE DAILY 30 Tab 12    predniSONE (DELTASONE) 10 mg tablet 6 tabs today and reduce by 1 tab daily 21 Tab 0    potassium chloride (K-DUR, KLOR-CON) 10 mEq tablet Take 2 Tabs by mouth daily. 30 Tab 12    fluticasone (FLONASE) 50 mcg/actuation nasal spray 2 Sprays by Both Nostrils route daily. 1 Bottle 12    amLODIPine (NORVASC) 5 mg tablet Take 1 Tab by mouth daily. 30 Tab 12    bumetanide (BUMEX) 1 mg tablet Take 1 Tab by mouth daily. 30 Tab 12    cloNIDine HCl (CATAPRES) 0.1 mg tablet TAKE ONE TABLET BY MOUTH TWICE DAILY 60 Tab 12    ergocalciferol (VITAMIN D2) 50,000 unit capsule Take 50,000 Units by mouth.  hydroxychloroquine (PLAQUENIL) 200 mg tablet       loratadine (CLARITIN) 10 mg tablet Take 1 tablet by mouth daily. 30 tablet 12    potassium chloride (K-DUR) 10 mEq tablet Take  by mouth daily. No Known Allergies    Objective:  Visit Vitals    /70    Pulse 88    Temp 98.6 °F (37 °C) (Oral)    Resp 20    Ht 5' 10\" (1.778 m)    Wt 195 lb (88.5 kg)    SpO2 99%    BMI 27.98 kg/m2     Physical Exam:   General appearance - alert, well appearing, and in mild distress  Mental status - alert, oriented to person, place, and time  EYE-KASEY, EOMI, corneas normal, no foreign bodies  ENT-ENT exam normal, no neck nodes or sinus tenderness  Nose - normal and patent, no erythema, discharge or polyps  Mouth - mucous membranes moist, pharynx normal without lesions  Neck - supple, no significant adenopathy   Chest - clear to auscultation, no wheezes, rales or rhonchi, symmetric air entry   Heart - normal rate, regular rhythm, normal S1, S2, no murmurs, rubs, clicks or gallops   Abdomen - soft, nontender, nondistended, no masses or organomegaly  Lymph- no adenopathy palpable  Ext-peripheral pulses normal, no pedal edema, no clubbing or cyanosis  Skin-Warm and dry. no hyperpigmentation, vitiligo, or suspicious lesions  Neuro -alert, oriented, normal speech, no focal findings or movement disorder noted  Neck-normal C-spine, no tenderness, full ROM without pain  Rt. lower leg hyperpigmentatioin,3+edema with erythema noted  Rt.knee-medial joint line tenderness noted  Back-tenderness lower lumbar spine and sacral spine      Results for orders placed or performed in visit on 57/40/82   METABOLIC PANEL, COMPREHENSIVE   Result Value Ref Range    Glucose 93 65 - 99 mg/dL    BUN 6 6 - 24 mg/dL    Creatinine 0.76 0.57 - 1.00 mg/dL    GFR est non-AA 88 >59 mL/min/1.73    GFR est  >59 mL/min/1.73    BUN/Creatinine ratio 8 (L) 9 - 23    Sodium 140 136 - 144 mmol/L    Potassium 3.8 3.5 - 5.2 mmol/L    Chloride 97 97 - 106 mmol/L    CO2 29 18 - 29 mmol/L    Calcium 9.0 8.7 - 10.2 mg/dL    Protein, total 9.1 (H) 6.0 - 8.5 g/dL    Albumin 3.8 3.5 - 5.5 g/dL    GLOBULIN, TOTAL 5.3 (H) 1.5 - 4.5 g/dL    A-G Ratio 0.7 (L) 1.1 - 2.5    Bilirubin, total 0.4 0.0 - 1.2 mg/dL    Alk. phosphatase 148 (H) 39 - 117 IU/L    AST (SGOT) 19 0 - 40 IU/L    ALT (SGPT) 12 0 - 32 IU/L   CBC W/O DIFF   Result Value Ref Range    WBC 4.8 3.4 - 10.8 x10E3/uL    RBC 3.63 (L) 3.77 - 5.28 x10E6/uL    HGB 10.0 (L) 11.1 - 15.9 g/dL    HCT 30.9 (L) 34.0 - 46.6 %    MCV 85 79 - 97 fL    MCH 27.5 26.6 - 33.0 pg    MCHC 32.4 31.5 - 35.7 g/dL    RDW 14.8 12.3 - 15.4 %    PLATELET 624 849 - 924 x10E3/uL   SPECIMEN STATUS REPORT   Result Value Ref Range    SPECIMEN STATUS REPORT COMMENT    PROTEIN ELECTROPHORESIS   Result Value Ref Range    Protein, total 9.0 (H) 6.0 - 8.5 g/dL    Albumin 3.2 2.9 - 4.4 g/dL    Alpha-1-globulin 0.3 0.0 - 0.4 g/dL    ALPHA-2 GLOBULIN 1.0 0.4 - 1.0 g/dL    Beta globulin 1.5 (H) 0.7 - 1.3 g/dL    Gamma globulin 3.1 (H) 0.4 - 1.8 g/dL    M-spike Not Observed Not Observed g/dL    Globulin, total 5.8 (H) 2.2 - 3.9 g/dL    A/G ratio 0.6 (L) 0.7 - 1.7    Please note Comment    IRON PROFILE   Result Value Ref Range    TIBC 241 (L) 250 - 450 ug/dL    UIBC 189 131 - 425 ug/dL    Iron 52 27 - 159 ug/dL    Iron % saturation 22 15 - 55 %   VITAMIN B12 & FOLATE   Result Value Ref Range    Vitamin B12 678 211 - 946 pg/mL    Folate 14.5 >3.0 ng/mL   SPECIMEN STATUS REPORT   Result Value Ref Range    SPECIMEN STATUS REPORT COMMENT        Assessment/Plan:    ICD-10-CM ICD-9-CM    1. Lumbar radiculopathy M54.16 724.4    2. Degenerative disc disease, cervical M50.30 722.4 cyclobenzaprine (FLEXERIL) 10 mg tablet   3. Degeneration of cervical intervertebral disc M50.30 722.4 naproxen (NAPROSYN) 500 mg tablet   4. Spinal stenosis of lumbar region M48.06 724.02    5. Discoid lupus L93.0 695.4    6. Essential hypertension, benign I10 401.1      Orders Placed This Encounter    gabapentin (NEURONTIN) 300 mg capsule     Sig: Take 1 Cap by mouth three (3) times daily.      Dispense:  90 Cap     Refill:  12    cyclobenzaprine (FLEXERIL) 10 mg tablet     Sig: Take 1 Tab by mouth two (2) times a day. Dispense:  60 Tab     Refill:  12    naproxen (NAPROSYN) 500 mg tablet     Sig: Take 1 Tab by mouth two (2) times daily (with meals). Dispense:  60 Tab     Refill:  3    losartan (COZAAR) 100 mg tablet     Sig: TAKE ONE TABLET BY MOUTH ONCE DAILY     Dispense:  30 Tab     Refill:  12    predniSONE (DELTASONE) 10 mg tablet     Si tabs today and reduce by 1 tab daily     Dispense:  21 Tab     Refill:  0     lose weight, follow low fat diet, follow low salt diet,Take 81mg aspirin daily  Patient Instructions   MyChart Activation    Thank you for requesting access to RealOps. Please follow the instructions below to securely access and download your online medical record. RealOps allows you to send messages to your doctor, view your test results, renew your prescriptions, schedule appointments, and more. How Do I Sign Up? 1. In your internet browser, go to [email or web address]  2. Click on the First Time User? Click Here link in the Sign In box. You will be redirect to the New Member Sign Up page. 3. Enter your RealOps Access Code exactly as it appears below. You will not need to use this code after youve completed the sign-up process. If you do not sign up before the expiration date, you must request a new code. RealOps Access Code: Activation code not generated  Current RealOps Status: Patient Declined (This is the date your RealOps access code will )    4. Enter the last four digits of your Social Security Number (xxxx) and Date of Birth (mm/dd/yyyy) as indicated and click Submit. You will be taken to the next sign-up page. 5. Create a RealOps ID. This will be your RealOps login ID and cannot be changed, so think of one that is secure and easy to remember. 6. Create a RealOps password. You can change your password at any time. 7. Enter your Password Reset Question and Answer. This can be used at a later time if you forget your password. 8. Enter your e-mail address. You will receive e-mail notification when new information is available in [address] 9. Click Sign Up. You can now view and download portions of your medical record. 10. Click the Download Summary menu link to download a portable copy of your medical information. Additional Information    If you have questions, please visit the Frequently Asked Questions section of the Ibelem website at [email or web address] Remember, Ibelem is NOT to be used for urgent needs. For medical emergencies, dial 911. Follow-up Disposition:  Return in about 4 weeks (around 4/27/2017), or if symptoms worsen or fail to improve. Comments: none                  An After Visit Summary was printed and given to the patient.

## 2017-04-03 ENCOUNTER — TELEPHONE (OUTPATIENT)
Dept: INTERNAL MEDICINE CLINIC | Age: 57
End: 2017-04-03

## 2017-04-03 DIAGNOSIS — M54.16 LUMBAR RADICULOPATHY: Primary | ICD-10-CM

## 2017-04-10 ENCOUNTER — TELEPHONE (OUTPATIENT)
Dept: INTERNAL MEDICINE CLINIC | Age: 57
End: 2017-04-10

## 2017-04-10 NOTE — TELEPHONE ENCOUNTER
Peer to peer arranged today at 3:45pm with DR. Phoenix Press with [facility] CASE NUMBER [id number]    DR. AU & DR. ARCINIEGA REVIEWED THE CHART AND AUTHORIZATION WAS OBTAINED  AUTHORIZATION NUMBER IS [id number]    VALID FOR 3 MONTHS PER DR. AU  HE WILL SEND A FAX. WE HAVE NOT RECEIVED FAXED YET.

## 2017-04-12 ENCOUNTER — HOSPITAL ENCOUNTER (OUTPATIENT)
Dept: INTERVENTIONAL RADIOLOGY/VASCULAR | Age: 57
Discharge: HOME | End: 2017-04-12
Attending: INTERNAL MEDICINE | Admitting: INTERNAL MEDICINE
Payer: COMMERCIAL

## 2017-04-12 VITALS
RESPIRATION RATE: 20 BRPM | TEMPERATURE: 98.3 F | SYSTOLIC BLOOD PRESSURE: 177 MMHG | WEIGHT: 192 LBS | BODY MASS INDEX: 26.88 KG/M2 | DIASTOLIC BLOOD PRESSURE: 105 MMHG | HEIGHT: 71 IN | HEART RATE: 77 BPM | OXYGEN SATURATION: 100 %

## 2017-04-12 DIAGNOSIS — M54.16 LUMBAR RADICULOPATHY: ICD-10-CM

## 2017-04-12 PROCEDURE — 64483 NJX AA&/STRD TFRM EPI L/S 1: CPT

## 2017-04-12 PROCEDURE — 74011250636 HC RX REV CODE- 250/636: Performed by: RADIOLOGY

## 2017-04-12 PROCEDURE — 74011000250 HC RX REV CODE- 250: Performed by: RADIOLOGY

## 2017-04-12 PROCEDURE — 74011636320 HC RX REV CODE- 636/320: Performed by: RADIOLOGY

## 2017-04-12 RX ORDER — LIDOCAINE HYDROCHLORIDE 10 MG/ML
3 INJECTION, SOLUTION EPIDURAL; INFILTRATION; INTRACAUDAL; PERINEURAL ONCE
Status: COMPLETED | OUTPATIENT
Start: 2017-04-12 | End: 2017-04-12

## 2017-04-12 RX ORDER — DEXAMETHASONE SODIUM PHOSPHATE 10 MG/ML
10 INJECTION INTRAMUSCULAR; INTRAVENOUS ONCE
Status: COMPLETED | OUTPATIENT
Start: 2017-04-12 | End: 2017-04-12

## 2017-04-12 RX ORDER — LIDOCAINE HYDROCHLORIDE 20 MG/ML
10 INJECTION, SOLUTION INFILTRATION; PERINEURAL ONCE
Status: COMPLETED | OUTPATIENT
Start: 2017-04-12 | End: 2017-04-12

## 2017-04-12 RX ORDER — CLONIDINE HYDROCHLORIDE 0.1 MG/1
TABLET ORAL
Qty: 60 TAB | Refills: 0 | Status: SHIPPED | COMMUNITY
Start: 2017-04-12 | End: 2017-08-23 | Stop reason: SDUPTHER

## 2017-04-12 RX ORDER — SODIUM CHLORIDE 9 MG/ML
3 INJECTION INTRAMUSCULAR; INTRAVENOUS; SUBCUTANEOUS ONCE
Status: COMPLETED | OUTPATIENT
Start: 2017-04-12 | End: 2017-04-12

## 2017-04-12 RX ORDER — SODIUM BICARBONATE 84 MG/ML
1 INJECTION, SOLUTION INTRAVENOUS
Status: COMPLETED | OUTPATIENT
Start: 2017-04-12 | End: 2017-04-12

## 2017-04-12 RX ADMIN — LIDOCAINE HYDROCHLORIDE 200 MG: 20 INJECTION, SOLUTION INFILTRATION; PERINEURAL at 09:35

## 2017-04-12 RX ADMIN — SODIUM CHLORIDE 3 ML: 9 INJECTION, SOLUTION INTRAMUSCULAR; INTRAVENOUS; SUBCUTANEOUS at 09:37

## 2017-04-12 RX ADMIN — DEXAMETHASONE SODIUM PHOSPHATE 10 MG: 10 INJECTION, SOLUTION INTRAMUSCULAR; INTRAVENOUS at 09:35

## 2017-04-12 RX ADMIN — SODIUM BICARBONATE 1 MEQ: 84 INJECTION, SOLUTION INTRAVENOUS at 09:35

## 2017-04-12 RX ADMIN — LIDOCAINE HYDROCHLORIDE 3 ML: 10 INJECTION, SOLUTION EPIDURAL; INFILTRATION; INTRACAUDAL; PERINEURAL at 09:35

## 2017-04-12 RX ADMIN — IOPAMIDOL 2 ML: 408 INJECTION, SOLUTION INTRATHECAL at 09:36

## 2017-04-12 NOTE — IP AVS SNAPSHOT
[address] [facility]. 
[phone] Patient: Marcelo Stovall MRN: [id number] ROM:2/1/0816 You are allergic to the following No active allergies Recent Documentation Height Weight Breastfeeding? BMI OB Status Smoking Status 1.803 m 87.1 kg No 26.78 kg/m2 Postmenopausal Former Smoker Emergency Contacts Name Discharge Info Relation Home Work Mobile DreJanett DISCHARGE CAREGIVER [3] Daughter [21] [phone] Janett Erickson  Child [2] [phone] About your hospitalization You were admitted on:  April 12, 2017 You last received care in the:  [facility] RAD ANGIO IR You were discharged on:  April 12, 2017 Unit phone number:  [phone] Why you were hospitalized Your primary diagnosis was:  Not on File Providers Seen During Your Hospitalizations Provider Role Specialty Primary office phone Luanne Mar MD Attending Provider Internal Medicine [phone] Your Primary Care Physician (PCP) Primary Care Physician Office Phone Office Fax [address] [phone] Follow-up Information None Your Appointments Thursday April 27, 2017  3:15 PM EDT ROUTINE CARE with Luanne Mar MD  
[facility] - [facility] ([facility]) [address] [place] [address],[address] [place] [id number]  
[phone] Current Discharge Medication List  
  
ASK your doctor about these medications Dose & Instructions Dispensing Information Comments Morning Noon Evening Bedtime  
 amLODIPine 5 mg tablet Commonly known as:  Nehemiah Shaffer Your last dose was: Your next dose is:    
   
   
 Dose:  5 mg Take 1 Tab by mouth daily. Quantity:  30 Tab Refills:  12  
     
   
   
   
  
 bumetanide 1 mg tablet Commonly known as:  Pedro Atkinson Your last dose was: Your next dose is:    
   
   
 Dose:  1 mg Take 1 Tab by mouth daily. Quantity:  30 Tab Refills:  12  
     
   
   
   
  
 cloNIDine HCl 0.1 mg tablet Commonly known as:  CATAPRES Your last dose was: Your next dose is: TAKE ONE TABLET BY MOUTH TWICE DAILY Quantity:  60 Tab Refills:  0  
     
   
   
   
  
 cyclobenzaprine 10 mg tablet Commonly known as:  FLEXERIL Your last dose was: Your next dose is:    
   
   
 Dose:  10 mg Take 1 Tab by mouth two (2) times a day. Quantity:  60 Tab Refills:  12  
     
   
   
   
  
 fluticasone 50 mcg/actuation nasal spray Commonly known as:  Darrel Marrow Your last dose was: Your next dose is:    
   
   
 Dose:  2 Spray 2 Sprays by Both Nostrils route daily. Quantity:  1 Bottle Refills:  12  
     
   
   
   
  
 gabapentin 300 mg capsule Commonly known as:  NEURONTIN Your last dose was: Your next dose is:    
   
   
 Dose:  300 mg Take 1 Cap by mouth three (3) times daily. Quantity:  90 Cap Refills:  12  
     
   
   
   
  
 hydroxychloroquine 200 mg tablet Commonly known as:  PLAQUENIL Your last dose was: Your next dose is:    
   
   
  Refills:  0  
     
   
   
   
  
 * K-DUR 10 mEq tablet Generic drug:  potassium chloride Your last dose was: Your next dose is: Take  by mouth daily. Refills:  0  
     
   
   
   
  
 * potassium chloride 10 mEq tablet Commonly known as:  K-DUR, KLOR-CON Your last dose was: Your next dose is:    
   
   
 Dose:  20 mEq Take 2 Tabs by mouth daily. Quantity:  30 Tab Refills:  12  
     
   
   
   
  
 loratadine 10 mg tablet Commonly known as:  Belen Tay Your last dose was: Your next dose is:    
   
   
 Dose:  10 mg Take 1 tablet by mouth daily. Quantity:  30 tablet Refills:  12 losartan 100 mg tablet Commonly known as:  COZAAR Your last dose was: Your next dose is: TAKE ONE TABLET BY MOUTH ONCE DAILY Quantity:  30 Tab Refills:  12  
     
   
   
   
  
 naproxen 500 mg tablet Commonly known as:  NAPROSYN Your last dose was: Your next dose is:    
   
   
 Dose:  500 mg Take 1 Tab by mouth two (2) times daily (with meals). Quantity:  60 Tab Refills:  3  
     
   
   
   
  
 predniSONE 10 mg tablet Commonly known as:  Earlie Reveal Your last dose was: Your next dose is:    
   
   
 6 tabs today and reduce by 1 tab daily Quantity:  21 Tab Refills:  0  
     
   
   
   
  
 VITAMIN D2 50,000 unit capsule Generic drug:  ergocalciferol Your last dose was: Your next dose is:    
   
   
 Dose:  41189 Units Take 50,000 Units by mouth. Refills:  0  
     
   
   
   
  
 * Notice: This list has 2 medication(s) that are the same as other medications prescribed for you. Read the directions carefully, and ask your doctor or other care provider to review them with you. Discharge Instructions [facility] Special Procedures/Radiology Department Steroidal Injection Go home and rest. 
 
 No vigorous physical activity today. Be aware that numbness and/or tingling can occur up to 24 hours after the injection. No driving today. Resume your previous diet. Resume your previous medications. Depending on your job, you may return to work in 25 to 48 hours. It may take up to one week after the injection to see a change or an improvement in your symptoms. Be sure to follow up with your physician. Tell your physician if the injection helped with your symptoms or if the injection did nothing for your symptoms. For minor discomfort, you can take Tylenol, as directed on the label. If you have any questions or concerns, please call [phone] and ask for the nurse on-call Discharge Orders None General Information Please provide this summary of care documentation to your next provider. Patient Signature:  ____________________________________________________________ Date:  ____________________________________________________________  
  
Ada Moseley Provider Signature:  ____________________________________________________________ Date:  ____________________________________________________________

## 2017-04-12 NOTE — DISCHARGE INSTRUCTIONS
[facility]  Special Procedures/Radiology Department      Steroidal Injection      Go home and rest.     No vigorous physical activity today. Be aware that numbness and/or tingling can occur up to 24 hours after the injection. No driving today. Resume your previous diet. Resume your previous medications. Depending on your job, you may return to work in 25 to 48 hours. It may take up to one week after the injection to see a change or an improvement in your symptoms. Be sure to follow up with your physician. Tell your physician if the injection helped with your symptoms or if the injection did nothing for your symptoms. For minor discomfort, you can take Tylenol, as directed on the label.       If you have any questions or concerns, please call [phone] and ask for the nurse on-call

## 2017-04-12 NOTE — ROUTINE PROCESS
Discharge instructions given and reviewed with pt and pt voices complete understanding of all instructions given. Pt taken out via wheelchair and discharged to home with daughter in car.

## 2017-04-27 ENCOUNTER — OFFICE VISIT (OUTPATIENT)
Dept: INTERNAL MEDICINE CLINIC | Age: 57
End: 2017-04-27

## 2017-04-27 VITALS
WEIGHT: 191 LBS | OXYGEN SATURATION: 99 % | HEIGHT: 71 IN | SYSTOLIC BLOOD PRESSURE: 110 MMHG | HEART RATE: 87 BPM | RESPIRATION RATE: 19 BRPM | BODY MASS INDEX: 26.74 KG/M2 | TEMPERATURE: 98.1 F | DIASTOLIC BLOOD PRESSURE: 60 MMHG

## 2017-04-27 DIAGNOSIS — M54.16 LUMBAR RADICULOPATHY: ICD-10-CM

## 2017-04-27 DIAGNOSIS — M50.30 DEGENERATIVE DISC DISEASE, CERVICAL: ICD-10-CM

## 2017-04-27 DIAGNOSIS — Z13.220 SCREENING CHOLESTEROL LEVEL: ICD-10-CM

## 2017-04-27 DIAGNOSIS — L03.116 CELLULITIS OF LEFT LOWER EXTREMITY: ICD-10-CM

## 2017-04-27 DIAGNOSIS — I10 ESSENTIAL HYPERTENSION, BENIGN: ICD-10-CM

## 2017-04-27 DIAGNOSIS — Z12.11 SCREENING FOR COLON CANCER: Primary | ICD-10-CM

## 2017-04-27 NOTE — PATIENT INSTRUCTIONS
[employer] Activation    Thank you for requesting access to TrustEgg. Please follow the instructions below to securely access and download your online medical record. TrustEgg allows you to send messages to your doctor, view your test results, renew your prescriptions, schedule appointments, and more. How Do I Sign Up? 1. In your internet browser, go to [email or web address]  2. Click on the First Time User? Click Here link in the Sign In box. You will be redirect to the New Member Sign Up page. 3. Enter your TrustEgg Access Code exactly as it appears below. You will not need to use this code after youve completed the sign-up process. If you do not sign up before the expiration date, you must request a new code. TrustEgg Access Code: Activation code not generated  Current TrustEgg Status: Patient Declined (This is the date your TrustEgg access code will )    4. Enter the last four digits of your Social Security Number (xxxx) and Date of Birth (mm/dd/yyyy) as indicated and click Submit. You will be taken to the next sign-up page. 5. Create a TrustEgg ID. This will be your TrustEgg login ID and cannot be changed, so think of one that is secure and easy to remember. 6. Create a TrustEgg password. You can change your password at any time. 7. Enter your Password Reset Question and Answer. This can be used at a later time if you forget your password. 8. Enter your e-mail address. You will receive e-mail notification when new information is available in [address] 9. Click Sign Up. You can now view and download portions of your medical record. 10. Click the Download Summary menu link to download a portable copy of your medical information. Additional Information    If you have questions, please visit the Frequently Asked Questions section of the TrustEgg website at [email or web address] Remember, TrustEgg is NOT to be used for urgent needs. For medical emergencies, dial 911.

## 2017-04-27 NOTE — PROGRESS NOTES
Abraham Marquez is a 62 y.o. female and presents with Leg Pain; Claudication; and Lupus    Subjective:  She had recurrent pains involving the lt. lower leg with associated numbness involving the lt. thigh ,states the pains were intense  She had her MRI of the lower spine that revealed a disc protrution at l4l5. And multilevel spinal stenosis  She has recently had an epidural steroid injection with some relief    Hypertension Review:  The patient has hypertension  Diet and Lifestyle: generally follows a  low sodium diet, exercises sporadically  Home BP Monitoring: is not measured at home. Pertinent ROS: taking medications as instructed, no medication side effects noted, no TIA's, no chest pain on exertion, no dyspnea on exertion, no swelling of ankles. She has recurrent cellulitis of the leg. it has improved. she has no edema. Review of Systems  Constitutional: negative for fevers, chills, anorexia and weight loss  Eyes:   negative for visual disturbance and irritation  ENT:   negative for tinnitus,sore throat,nasal congestion,ear pains. hoarseness  Respiratory:   cough,   CV:   negative for chest pain, palpitations, lower extremity edema  GI:   negative for nausea, vomiting, diarrhea, abdominal pain,melena  Endo:               negative for polyuria,polydipsia,polyphagia,heat intolerance  Genitourinary: negative for frequency, dysuria and hematuria  Integument:  negative for rash and pruritus  Hematologic:  negative for easy bruising and gum/nose bleeding  Musculoskel:  myalgias, arthralgias, back pain, , joint pain  Neurological:  negative for headaches, dizziness, vertigo, memory problems and gait   Behavl/Psych: negative for feelings of anxiety, depression, mood changes    Past Medical History:   Diagnosis Date    Anemia, unspecified 2/21/2011    Arthritis, Degenerative,Knee 2/21/2011    Degenerative Disc Disease 2/21/2011    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 No pertinent surgical history. Social History     Social History    Marital status: SINGLE     Spouse name: N/A    Number of children: N/A    Years of education: N/A     Social History Main Topics    Smoking status: Former Smoker    Smokeless tobacco: Never Used    Alcohol use 0.5 oz/week     1 Glasses of wine per week      Comment: SOCIALLY    Drug use: No    Sexual activity: No     Other Topics Concern    None     Social History Narrative     Family History   Problem Relation Age of Onset    Diabetes Mother     Hypertension Mother     Hypertension Father     Cancer Father      Current Outpatient Prescriptions   Medication Sig Dispense Refill    cloNIDine HCl (CATAPRES) 0.1 mg tablet TAKE ONE TABLET BY MOUTH TWICE DAILY 60 Tab 0    gabapentin (NEURONTIN) 300 mg capsule Take 1 Cap by mouth three (3) times daily. 90 Cap 12    cyclobenzaprine (FLEXERIL) 10 mg tablet Take 1 Tab by mouth two (2) times a day. 60 Tab 12    naproxen (NAPROSYN) 500 mg tablet Take 1 Tab by mouth two (2) times daily (with meals). 60 Tab 3    losartan (COZAAR) 100 mg tablet TAKE ONE TABLET BY MOUTH ONCE DAILY 30 Tab 12    predniSONE (DELTASONE) 10 mg tablet 6 tabs today and reduce by 1 tab daily 21 Tab 0    potassium chloride (K-DUR, KLOR-CON) 10 mEq tablet Take 2 Tabs by mouth daily. 30 Tab 12    fluticasone (FLONASE) 50 mcg/actuation nasal spray 2 Sprays by Both Nostrils route daily. 1 Bottle 12    amLODIPine (NORVASC) 5 mg tablet Take 1 Tab by mouth daily. 30 Tab 12    bumetanide (BUMEX) 1 mg tablet Take 1 Tab by mouth daily. 30 Tab 12    ergocalciferol (VITAMIN D2) 50,000 unit capsule Take 50,000 Units by mouth.  hydroxychloroquine (PLAQUENIL) 200 mg tablet       loratadine (CLARITIN) 10 mg tablet Take 1 tablet by mouth daily.  30 tablet 12    potassium chloride (K-DUR) 10 mEq tablet Take  by mouth daily. No Known Allergies    Objective:  Visit Vitals    /60    Pulse 87    Temp 98.1 °F (36.7 °C) (Oral)    Resp 19    Ht 5' 11\" (1.803 m)    Wt 191 lb (86.6 kg)    SpO2 99%    BMI 26.64 kg/m2     Physical Exam:   General appearance - alert, well appearing, and in mild distress  Mental status - alert, oriented to person, place, and time  EYE-KASEY, EOMI, corneas normal, no foreign bodies  ENT-ENT exam normal, no neck nodes or sinus tenderness  Nose - normal and patent, no erythema, discharge or polyps  Mouth - mucous membranes moist, pharynx normal without lesions  Neck - supple, no significant adenopathy   Chest - clear to auscultation, no wheezes, rales or rhonchi, symmetric air entry   Heart - normal rate, regular rhythm, normal S1, S2, no murmurs, rubs, clicks or gallops   Abdomen - soft, nontender, nondistended, no masses or organomegaly  Lymph- no adenopathy palpable  Ext-peripheral pulses normal, no pedal edema, no clubbing or cyanosis  Skin-Warm and dry. no hyperpigmentation, vitiligo, or suspicious lesions  Neuro -alert, oriented, normal speech, no focal findings or movement disorder noted  Neck-normal C-spine, no tenderness, full ROM without pain  Rt. lower leg hyperpigmentatioin,3+edema with erythema noted  Rt.knee-medial joint line tenderness noted  Back-tenderness lower lumbar spine and sacral spine      Results for orders placed or performed in visit on 42/16/45   METABOLIC PANEL, COMPREHENSIVE   Result Value Ref Range    Glucose 93 65 - 99 mg/dL    BUN 6 6 - 24 mg/dL    Creatinine 0.76 0.57 - 1.00 mg/dL    GFR est non-AA 88 >59 mL/min/1.73    GFR est  >59 mL/min/1.73    BUN/Creatinine ratio 8 (L) 9 - 23    Sodium 140 136 - 144 mmol/L    Potassium 3.8 3.5 - 5.2 mmol/L    Chloride 97 97 - 106 mmol/L    CO2 29 18 - 29 mmol/L    Calcium 9.0 8.7 - 10.2 mg/dL    Protein, total 9.1 (H) 6.0 - 8.5 g/dL    Albumin 3.8 3.5 - 5.5 g/dL    GLOBULIN, TOTAL 5.3 (H) 1.5 - 4.5 g/dL    A-G Ratio 0.7 (L) 1.1 - 2.5    Bilirubin, total 0.4 0.0 - 1.2 mg/dL    Alk. phosphatase 148 (H) 39 - 117 IU/L    AST (SGOT) 19 0 - 40 IU/L    ALT (SGPT) 12 0 - 32 IU/L   CBC W/O DIFF   Result Value Ref Range    WBC 4.8 3.4 - 10.8 x10E3/uL    RBC 3.63 (L) 3.77 - 5.28 x10E6/uL    HGB 10.0 (L) 11.1 - 15.9 g/dL    HCT 30.9 (L) 34.0 - 46.6 %    MCV 85 79 - 97 fL    MCH 27.5 26.6 - 33.0 pg    MCHC 32.4 31.5 - 35.7 g/dL    RDW 14.8 12.3 - 15.4 %    PLATELET 861 798 - 313 x10E3/uL   SPECIMEN STATUS REPORT   Result Value Ref Range    SPECIMEN STATUS REPORT COMMENT    PROTEIN ELECTROPHORESIS   Result Value Ref Range    Protein, total 9.0 (H) 6.0 - 8.5 g/dL    Albumin 3.2 2.9 - 4.4 g/dL    Alpha-1-globulin 0.3 0.0 - 0.4 g/dL    ALPHA-2 GLOBULIN 1.0 0.4 - 1.0 g/dL    Beta globulin 1.5 (H) 0.7 - 1.3 g/dL    Gamma globulin 3.1 (H) 0.4 - 1.8 g/dL    M-spike Not Observed Not Observed g/dL    Globulin, total 5.8 (H) 2.2 - 3.9 g/dL    A/G ratio 0.6 (L) 0.7 - 1.7    Please note Comment    IRON PROFILE   Result Value Ref Range    TIBC 241 (L) 250 - 450 ug/dL    UIBC 189 131 - 425 ug/dL    Iron 52 27 - 159 ug/dL    Iron % saturation 22 15 - 55 %   VITAMIN B12 & FOLATE   Result Value Ref Range    Vitamin B12 678 211 - 946 pg/mL    Folate 14.5 >3.0 ng/mL   SPECIMEN STATUS REPORT   Result Value Ref Range    SPECIMEN STATUS REPORT COMMENT        Assessment/Plan:    ICD-10-CM ICD-9-CM    1. Screening for colon cancer Z12.11 V76.51 OCCULT BLOOD, IMMUNOASSAY (FIT)   2. Essential hypertension, benign I10 401.1    3. Lumbar radiculopathy M54.16 724.4    4. Degenerative disc disease, cervical M50.30 722.4    5. Cellulitis of left lower extremity L03.116 682.6    6.  Screening cholesterol level Z13.220 V77.91 AMB POC LIPID PROFILE     Orders Placed This Encounter    OCCULT BLOOD, IMMUNOASSAY (FIT)    AMB POC LIPID PROFILE     lose weight, follow low fat diet, follow low salt diet,Take 81mg aspirin daily  Patient Instructions   MyChart Activation    Thank you for requesting access to [employer]. Please follow the instructions below to securely access and download your online medical record. [employer]ar allows you to send messages to your doctor, view your test results, renew your prescriptions, schedule appointments, and more. How Do I Sign Up? 1. In your internet browser, go to [email or web address]  2. Click on the First Time User? Click Here link in the Sign In box. You will be redirect to the New Member Sign Up page. 3. Enter your Qoostar Access Code exactly as it appears below. You will not need to use this code after youve completed the sign-up process. If you do not sign up before the expiration date, you must request a new code. [employer]oostar Access Code: Activation code not generated  Current [employer]ar Status: Patient Declined (This is the date your Qoostar access code will )    4. Enter the last four digits of your Social Security Number (xxxx) and Date of Birth (mm/dd/yyyy) as indicated and click Submit. You will be taken to the next sign-up page. 5. Create a Qoostar ID. This will be your Qoostar login ID and cannot be changed, so think of one that is secure and easy to remember. 6. Create a Qoostar password. You can change your password at any time. 7. Enter your Password Reset Question and Answer. This can be used at a later time if you forget your password. 8. Enter your e-mail address. You will receive e-mail notification when new information is available in [address] 9. Click Sign Up. You can now view and download portions of your medical record. 10. Click the Download Summary menu link to download a portable copy of your medical information. Additional Information    If you have questions, please visit the Frequently Asked Questions section of the Qoostar website at [email or web address] Remember, [employer]oostar is NOT to be used for urgent needs.  For medical emergencies, dial 911. Follow-up Disposition:  Return in about 4 weeks (around 5/25/2017), or if symptoms worsen or fail to improve. Comments: none                  An After Visit Summary was printed and given to the patient.

## 2017-04-27 NOTE — MR AVS SNAPSHOT
Visit Information Date & Time Provider Department Dept. Phone Encounter #  
 4/27/2017  3:15 PM Lindsey Patterson MD [address] [facility] [phone] [id number] Follow-up Instructions Return in about 4 weeks (around 5/25/2017), or if symptoms worsen or fail to improve. Upcoming Health Maintenance Date Due  
 PAP AKA CERVICAL CYTOLOGY 10/16/2016 FOBT Q 1 YEAR AGE 50-75 2/2/2017 BREAST CANCER SCRN MAMMOGRAM 5/2/2018 DTaP/Tdap/Td series (2 - Td) 11/17/2025 Allergies as of 4/27/2017  Review Complete On: 4/27/2017 By: Lindsey Patterson MD  
 No Known Allergies Current Immunizations  Never Reviewed Name Date Influenza Nasal Vaccine 1/3/2017 Tdap 11/17/2015 Not reviewed this visit You Were Diagnosed With   
  
 Codes Comments Screening for colon cancer    -  Primary ICD-10-CM: Z12.11 ICD-9-CM: V76.51 Essential hypertension, benign     ICD-10-CM: I10 
ICD-9-CM: 401.1 Lumbar radiculopathy     ICD-10-CM: M54.16 
ICD-9-CM: 724.4 Degenerative disc disease, cervical     ICD-10-CM: M50.30 ICD-9-CM: 722.4 Cellulitis of left lower extremity     ICD-10-CM: L03.116 ICD-9-CM: 682.6 Screening cholesterol level     ICD-10-CM: H05.087 ICD-9-CM: V77.91 Vitals BP Pulse Temp Resp Height(growth percentile) Weight(growth percentile) 110/60 87 98.1 °F (36.7 °C) (Oral) 19 5' 11\" (1.803 m) 191 lb (86.6 kg) SpO2 BMI OB Status Smoking Status 99% 26.64 kg/m2 Postmenopausal Former Smoker BMI and BSA Data Body Mass Index Body Surface Area  
 26.64 kg/m 2 2.08 m 2 Preferred Pharmacy Pharmacy Name Phone [facility] [address] , [address] [place] [address] [phone] Your Updated Medication List  
  
   
This list is accurate as of: 4/27/17  4:09 PM.  Always use your most recent med list. amLODIPine 5 mg tablet Commonly known as:  Betty Fast Take 1 Tab by mouth daily. bumetanide 1 mg tablet Commonly known as:  Doc Engle Take 1 Tab by mouth daily. cloNIDine HCl 0.1 mg tablet Commonly known as:  CATAPRES  
TAKE ONE TABLET BY MOUTH TWICE DAILY  
  
 cyclobenzaprine 10 mg tablet Commonly known as:  FLEXERIL Take 1 Tab by mouth two (2) times a day. fluticasone 50 mcg/actuation nasal spray Commonly known as:  Tasha Valente 2 Sprays by Both Nostrils route daily. gabapentin 300 mg capsule Commonly known as:  NEURONTIN Take 1 Cap by mouth three (3) times daily. hydroxychloroquine 200 mg tablet Commonly known as:  PLAQUENIL  
  
 * K-DUR 10 mEq tablet Generic drug:  potassium chloride Take  by mouth daily. * potassium chloride 10 mEq tablet Commonly known as:  KLOR-CON Take 2 Tabs by mouth daily. loratadine 10 mg tablet Commonly known as:  Rupesh Marlen Take 1 tablet by mouth daily. losartan 100 mg tablet Commonly known as:  COZAAR  
TAKE ONE TABLET BY MOUTH ONCE DAILY  
  
 naproxen 500 mg tablet Commonly known as:  NAPROSYN Take 1 Tab by mouth two (2) times daily (with meals). predniSONE 10 mg tablet Commonly known as:  DELTASONE  
6 tabs today and reduce by 1 tab daily VITAMIN D2 50,000 unit capsule Generic drug:  ergocalciferol Take 50,000 Units by mouth. * Notice: This list has 2 medication(s) that are the same as other medications prescribed for you. Read the directions carefully, and ask your doctor or other care provider to review them with you. We Performed the Following AMB POC LIPID PROFILE [53605 CPT(R)] OCCULT BLOOD, IMMUNOASSAY (FIT) [id number] CPT(R)] Follow-up Instructions Return in about 4 weeks (around 5/25/2017), or if symptoms worsen or fail to improve. Patient Instructions [employer] Activation Thank you for requesting access to [employer].  Please follow the instructions below to securely access and download your online medical record. [employer] allows you to send messages to your doctor, view your test results, renew your prescriptions, schedule appointments, and more. How Do I Sign Up? 1. In your internet browser, go to [email or web address] 
2. Click on the First Time User? Click Here link in the Sign In box. You will be redirect to the New Member Sign Up page. 3. Enter your [employer] Access Code exactly as it appears below. You will not need to use this code after youve completed the sign-up process. If you do not sign up before the expiration date, you must request a new code. [employer] Access Code: Activation code not generated Current [employer] Status: Patient Declined (This is the date your [employer] access code will ) 4. Enter the last four digits of your Social Security Number (xxxx) and Date of Birth (mm/dd/yyyy) as indicated and click Submit. You will be taken to the next sign-up page. 5. Create a [employer] ID. This will be your [employer] login ID and cannot be changed, so think of one that is secure and easy to remember. 6. Create a [employer] password. You can change your password at any time. 7. Enter your Password Reset Question and Answer. This can be used at a later time if you forget your password. 8. Enter your e-mail address. You will receive e-mail notification when new information is available in [address] 9. Click Sign Up. You can now view and download portions of your medical record. 10. Click the Download Summary menu link to download a portable copy of your medical information. Additional Information If you have questions, please visit the Frequently Asked Questions section of the [employer] website at [email or web address] Remember, [employer] is NOT to be used for urgent needs. For medical emergencies, dial 911. Please provide this summary of care documentation to your next provider. Your primary care clinician is listed as Shima Sanchez. If you have any questions after today's visit, please call [phone].

## 2017-05-25 ENCOUNTER — OFFICE VISIT (OUTPATIENT)
Dept: INTERNAL MEDICINE CLINIC | Age: 57
End: 2017-05-25

## 2017-05-25 VITALS
TEMPERATURE: 96.8 F | OXYGEN SATURATION: 98 % | DIASTOLIC BLOOD PRESSURE: 93 MMHG | SYSTOLIC BLOOD PRESSURE: 174 MMHG | RESPIRATION RATE: 18 BRPM | WEIGHT: 195.8 LBS | HEART RATE: 86 BPM | BODY MASS INDEX: 27.31 KG/M2

## 2017-05-25 DIAGNOSIS — H53.8 BLURRED VISION: Primary | ICD-10-CM

## 2017-05-25 DIAGNOSIS — L93.0 DISCOID LUPUS: ICD-10-CM

## 2017-05-25 DIAGNOSIS — I10 ESSENTIAL HYPERTENSION, BENIGN: ICD-10-CM

## 2017-05-25 DIAGNOSIS — K21.9 GASTROESOPHAGEAL REFLUX DISEASE WITHOUT ESOPHAGITIS: ICD-10-CM

## 2017-05-25 NOTE — MR AVS SNAPSHOT
Visit Information Date & Time Provider Department Dept. Phone Encounter #  
 5/25/2017  3:00 PM Janes Winston MD 1404 [facility] [phone] [id number] Follow-up Instructions Return in about 3 months (around 8/25/2017), or if symptoms worsen or fail to improve. Follow-up and Disposition History Upcoming Health Maintenance Date Due  
 PAP AKA CERVICAL CYTOLOGY 10/16/2016 FOBT Q 1 YEAR AGE 50-75 2/2/2017 INFLUENZA AGE 9 TO ADULT 8/1/2017 BREAST CANCER SCRN MAMMOGRAM 5/2/2018 DTaP/Tdap/Td series (2 - Td) 11/17/2025 Allergies as of 5/25/2017  Review Complete On: 4/27/2017 By: Janes Winston MD  
 No Known Allergies Current Immunizations  Never Reviewed Name Date Influenza Nasal Vaccine 1/3/2017 Tdap 11/17/2015 Not reviewed this visit You Were Diagnosed With   
  
 Codes Comments Blurred vision    -  Primary ICD-10-CM: H53.8 ICD-9-CM: 368.8 Essential hypertension, benign     ICD-10-CM: I10 
ICD-9-CM: 401.1 Discoid lupus     ICD-10-CM: L93.0 ICD-9-CM: 695.4 Gastroesophageal reflux disease without esophagitis     ICD-10-CM: K21.9 ICD-9-CM: 530.81 Vitals BP Pulse Temp Resp Weight(growth percentile) SpO2  
 (!) 174/93 86 96.8 °F (36 °C) (Oral) 18 195 lb 12.8 oz (88.8 kg) 98% BMI OB Status Smoking Status 27.31 kg/m2 Postmenopausal Former Smoker BMI and BSA Data Body Mass Index Body Surface Area  
 27.31 kg/m 2 2.11 m 2 Preferred Pharmacy Pharmacy Name Phone [facility] [address] , [address] [place] [address] [phone] Your Updated Medication List  
  
   
This list is accurate as of: 5/25/17  4:23 PM.  Always use your most recent med list. amLODIPine 5 mg tablet Commonly known as:  Rosario Cota Take 1 Tab by mouth daily. bumetanide 1 mg tablet Commonly known as:  Bhavana Orantes Take 1 Tab by mouth daily. cloNIDine HCl 0.1 mg tablet Commonly known as:  CATAPRES  
TAKE ONE TABLET BY MOUTH TWICE DAILY  
  
 cyclobenzaprine 10 mg tablet Commonly known as:  FLEXERIL Take 1 Tab by mouth two (2) times a day. fluticasone 50 mcg/actuation nasal spray Commonly known as:  Kaila Bough 2 Sprays by Both Nostrils route daily. gabapentin 300 mg capsule Commonly known as:  NEURONTIN Take 1 Cap by mouth three (3) times daily. hydroxychloroquine 200 mg tablet Commonly known as:  PLAQUENIL  
  
 * K-DUR 10 mEq tablet Generic drug:  potassium chloride Take  by mouth daily. * potassium chloride 10 mEq tablet Commonly known as:  KLOR-CON Take 2 Tabs by mouth daily. loratadine 10 mg tablet Commonly known as:  Anson Ne Take 1 tablet by mouth daily. losartan 100 mg tablet Commonly known as:  COZAAR  
TAKE ONE TABLET BY MOUTH ONCE DAILY  
  
 naproxen 500 mg tablet Commonly known as:  NAPROSYN Take 1 Tab by mouth two (2) times daily (with meals). predniSONE 10 mg tablet Commonly known as:  DELTASONE  
6 tabs today and reduce by 1 tab daily VITAMIN D2 50,000 unit capsule Generic drug:  ergocalciferol Take 50,000 Units by mouth. * Notice: This list has 2 medication(s) that are the same as other medications prescribed for you. Read the directions carefully, and ask your doctor or other care provider to review them with you. We Performed the Following REFERRAL TO DERMATOLOGY [REF19 Custom] Comments:  
 daniel REFERRAL TO OPHTHALMOLOGY [REF57 Custom] Follow-up Instructions Return in about 3 months (around 8/25/2017), or if symptoms worsen or fail to improve. Referral Information Referral ID Referred By Referred To  
  
 [id number] Valeria Grant, [address] [place] [address] Phone: [phone] Fax: [phone] Visits Status Start Date End Date 1 New Request 17 If your referral has a status of pending review or denied, additional information will be sent to support the outcome of this decision. Referral ID Referred By Referred To  
 [address] [place] [address] [address], [facility] [place] [address] [place], [facility] Visits Status Start Date End Date 1 New Request 17 If your referral has a status of pending review or denied, additional information will be sent to support the outcome of this decision. Patient Instructions MyChart Activation Thank you for requesting access to [employer]. Please follow the instructions below to securely access and download your online medical record. [employer] allows you to send messages to your doctor, view your test results, renew your prescriptions, schedule appointments, and more. How Do I Sign Up? 1. In your internet browser, go to [email or web address] 
2. Click on the First Time User? Click Here link in the Sign In box. You will be redirect to the New Member Sign Up page. 3. Enter your [employer] Access Code exactly as it appears below. You will not need to use this code after youve completed the sign-up process. If you do not sign up before the expiration date, you must request a new code. [employer] Access Code: Activation code not generated Current [employer] Status: Patient Declined (This is the date your ipvivet access code will ) 4. Enter the last four digits of your Social Security Number (xxxx) and Date of Birth (mm/dd/yyyy) as indicated and click Submit. You will be taken to the next sign-up page. 5. Create a ipvivet ID. This will be your [employer] login ID and cannot be changed, so think of one that is secure and easy to remember. 6. Create a [employer] password. You can change your password at any time. 7. Enter your Password Reset Question and Answer.  This can be used at a later time if you forget your password. 8. Enter your e-mail address. You will receive e-mail notification when new information is available in [address] 9. Click Sign Up. You can now view and download portions of your medical record. 10. Click the Download Summary menu link to download a portable copy of your medical information. Additional Information If you have questions, please visit the Frequently Asked Questions section of the [employer] website at [email or web address] "[email or web address]. [email or web address] Remember, [employer] is NOT to be used for urgent needs. For medical emergencies, dial 911. Please provide this summary of care documentation to your next provider. Your primary care clinician is listed as Alray Page. If you have any questions after today's visit, please call [phone].

## 2017-05-25 NOTE — PROGRESS NOTES
Genesis Romero is a 62 y.o. female and presents with Skin Problem; Lupus; and Hypertension    Subjective:  She had recurrent pains involving the lt. lower leg with associated numbness involving the lt. thigh ,states the pains were intense  She had her MRI of the lower spine that revealed a disc protrution at l4l5. And multilevel spinal stenosis  She has recently had an epidural steroid injection with some relief    Hypertension Review:  The patient has hypertension  Diet and Lifestyle: generally follows a  low sodium diet, exercises sporadically  Home BP Monitoring: is not measured at home. Pertinent ROS: taking medications as instructed, no medication side effects noted, no TIA's, no chest pain on exertion, no dyspnea on exertion, no swelling of ankles. She has recurrent cellulitis of the leg. it has improved. she has no edema. she still has night time pains      States she needs an eye referral    Review of Systems  Constitutional: negative for fevers, chills, anorexia and weight loss  Eyes:   visual disturbance and irritation  ENT:   negative for tinnitus,sore throat,nasal congestion,ear pains. hoarseness  Respiratory:   cough,   CV:   negative for chest pain, palpitations, lower extremity edema  GI:   negative for nausea, vomiting, diarrhea, abdominal pain,melena  Endo:               negative for polyuria,polydipsia,polyphagia,heat intolerance  Genitourinary: negative for frequency, dysuria and hematuria  Integument:  negative for rash and pruritus  Hematologic:  negative for easy bruising and gum/nose bleeding  Musculoskel:  myalgias, arthralgias, back pain, , joint pain  Neurological:  negative for headaches, dizziness, vertigo, memory problems and gait   Behavl/Psych: negative for feelings of anxiety, depression, mood changes    Past Medical History:   Diagnosis Date    Anemia, unspecified 2/21/2011    Arthritis, Degenerative,Knee 2/21/2011    Degenerative Disc Disease 2/21/2011    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 Social History     Social History    Marital status: SINGLE     Spouse name: N/A    Number of children: N/A    Years of education: N/A     Social History Main Topics    Smoking status: Former Smoker    Smokeless tobacco: Never Used    Alcohol use 0.5 oz/week     1 Glasses of wine per week      Comment: SOCIALLY    Drug use: No    Sexual activity: No     Other Topics Concern    None     Social History Narrative     Family History   Problem Relation Age of Onset    Diabetes Mother     Hypertension Mother     Hypertension Father     Cancer Father      Current Outpatient Prescriptions   Medication Sig Dispense Refill    cloNIDine HCl (CATAPRES) 0.1 mg tablet TAKE ONE TABLET BY MOUTH TWICE DAILY 60 Tab 0    gabapentin (NEURONTIN) 300 mg capsule Take 1 Cap by mouth three (3) times daily. 90 Cap 12    cyclobenzaprine (FLEXERIL) 10 mg tablet Take 1 Tab by mouth two (2) times a day. 60 Tab 12    naproxen (NAPROSYN) 500 mg tablet Take 1 Tab by mouth two (2) times daily (with meals). 60 Tab 3    losartan (COZAAR) 100 mg tablet TAKE ONE TABLET BY MOUTH ONCE DAILY 30 Tab 12    predniSONE (DELTASONE) 10 mg tablet 6 tabs today and reduce by 1 tab daily 21 Tab 0    potassium chloride (K-DUR, KLOR-CON) 10 mEq tablet Take 2 Tabs by mouth daily. 30 Tab 12    fluticasone (FLONASE) 50 mcg/actuation nasal spray 2 Sprays by Both Nostrils route daily. 1 Bottle 12    amLODIPine (NORVASC) 5 mg tablet Take 1 Tab by mouth daily. 30 Tab 12    bumetanide (BUMEX) 1 mg tablet Take 1 Tab by mouth daily. 30 Tab 12    ergocalciferol (VITAMIN D2) 50,000 unit capsule Take 50,000 Units by mouth.       hydroxychloroquine (PLAQUENIL) 200 mg tablet       loratadine (CLARITIN) 10 mg tablet Take 1 tablet by mouth daily. 30 tablet 12    potassium chloride (K-DUR) 10 mEq tablet Take  by mouth daily. No Known Allergies    Objective:  Visit Vitals    BP (!) 174/93    Pulse 86    Temp 96.8 °F (36 °C) (Oral)    Resp 18    Wt 195 lb 12.8 oz (88.8 kg)    SpO2 98%    BMI 27.31 kg/m2     Physical Exam:   General appearance - alert, well appearing, and in mild distress  Mental status - alert, oriented to person, place, and time  EYE-KASEY, EOMI, corneas normal, no foreign bodies  ENT-ENT exam normal, no neck nodes or sinus tenderness  Nose - normal and patent, no erythema, discharge or polyps  Mouth - mucous membranes moist, pharynx normal without lesions  Neck - supple, no significant adenopathy   Chest - clear to auscultation, no wheezes, rales or rhonchi, symmetric air entry   Heart - normal rate, regular rhythm, normal S1, S2, no murmurs, rubs, clicks or gallops   Abdomen - soft, nontender, nondistended, no masses or organomegaly  Lymph- no adenopathy palpable  Ext-peripheral pulses normal, no pedal edema, no clubbing or cyanosis  Skin-Warm and dry. no hyperpigmentation, vitiligo, or suspicious lesions  Neuro -alert, oriented, normal speech, no focal findings or movement disorder noted  Neck-normal C-spine, no tenderness, full ROM without pain  Rt. lower leg hyperpigmentatioin,3+edema with erythema noted  Rt.knee-medial joint line tenderness noted  Back-tenderness lower lumbar spine and sacral spine      Results for orders placed or performed in visit on 57/46/43   METABOLIC PANEL, COMPREHENSIVE   Result Value Ref Range    Glucose 93 65 - 99 mg/dL    BUN 6 6 - 24 mg/dL    Creatinine 0.76 0.57 - 1.00 mg/dL    GFR est non-AA 88 >59 mL/min/1.73    GFR est  >59 mL/min/1.73    BUN/Creatinine ratio 8 (L) 9 - 23    Sodium 140 136 - 144 mmol/L    Potassium 3.8 3.5 - 5.2 mmol/L    Chloride 97 97 - 106 mmol/L    CO2 29 18 - 29 mmol/L    Calcium 9.0 8.7 - 10.2 mg/dL    Protein, total 9.1 (H) 6.0 - 8.5 g/dL    Albumin 3.8 3.5 - 5.5 g/dL    GLOBULIN, TOTAL 5.3 (H) 1.5 - 4.5 g/dL    A-G Ratio 0.7 (L) 1.1 - 2.5    Bilirubin, total 0.4 0.0 - 1.2 mg/dL    Alk. phosphatase 148 (H) 39 - 117 IU/L    AST (SGOT) 19 0 - 40 IU/L    ALT (SGPT) 12 0 - 32 IU/L   CBC W/O DIFF   Result Value Ref Range    WBC 4.8 3.4 - 10.8 x10E3/uL    RBC 3.63 (L) 3.77 - 5.28 x10E6/uL    HGB 10.0 (L) 11.1 - 15.9 g/dL    HCT 30.9 (L) 34.0 - 46.6 %    MCV 85 79 - 97 fL    MCH 27.5 26.6 - 33.0 pg    MCHC 32.4 31.5 - 35.7 g/dL    RDW 14.8 12.3 - 15.4 %    PLATELET 932 066 - 194 x10E3/uL   SPECIMEN STATUS REPORT   Result Value Ref Range    SPECIMEN STATUS REPORT COMMENT    PROTEIN ELECTROPHORESIS   Result Value Ref Range    Protein, total 9.0 (H) 6.0 - 8.5 g/dL    Albumin 3.2 2.9 - 4.4 g/dL    Alpha-1-globulin 0.3 0.0 - 0.4 g/dL    ALPHA-2 GLOBULIN 1.0 0.4 - 1.0 g/dL    Beta globulin 1.5 (H) 0.7 - 1.3 g/dL    Gamma globulin 3.1 (H) 0.4 - 1.8 g/dL    M-spike Not Observed Not Observed g/dL    Globulin, total 5.8 (H) 2.2 - 3.9 g/dL    A/G ratio 0.6 (L) 0.7 - 1.7    Please note Comment    IRON PROFILE   Result Value Ref Range    TIBC 241 (L) 250 - 450 ug/dL    UIBC 189 131 - 425 ug/dL    Iron 52 27 - 159 ug/dL    Iron % saturation 22 15 - 55 %   VITAMIN B12 & FOLATE   Result Value Ref Range    Vitamin B12 678 211 - 946 pg/mL    Folate 14.5 >3.0 ng/mL   SPECIMEN STATUS REPORT   Result Value Ref Range    SPECIMEN STATUS REPORT COMMENT        Assessment/Plan:    ICD-10-CM ICD-9-CM    1. Blurred vision H53.8 368.8 REFERRAL TO OPHTHALMOLOGY      CANCELED: REFERRAL TO OPHTHALMOLOGY   2. Essential hypertension, benign I10 401.1    3.  Discoid lupus L93.0 695.4 REFERRAL TO DERMATOLOGY   4. Gastroesophageal reflux disease without esophagitis K21.9 530.81      Orders Placed This Encounter    REFERRAL TO OPHTHALMOLOGY     Referral Priority:   Routine     Referral Type:   Consultation     Referral Reason:   Specialty Services Required     Referred to Provider:   Bryant Silva MD     Requested Specialty:   Ophthalmology    REFERRAL TO DERMATOLOGY     Referral Priority:   Routine     Referral Type:   Consultation     Referral Reason:   Specialty Services Required     Referral Location:   Robby Chinchilla MD     Referred to Provider:   Robby Chinchilla MD     Number of Visits Requested:   1     lose weight, follow low fat diet, follow low salt diet,Take 81mg aspirin daily  Patient Instructions   MyChart Activation    Thank you for requesting access to [employer]. Please follow the instructions below to securely access and download your online medical record. [employer] allows you to send messages to your doctor, view your test results, renew your prescriptions, schedule appointments, and more. How Do I Sign Up? 1. In your internet browser, go to [email or web address]  2. Click on the First Time User? Click Here link in the Sign In box. You will be redirect to the New Member Sign Up page. 3. Enter your [employer] Access Code exactly as it appears below. You will not need to use this code after youve completed the sign-up process. If you do not sign up before the expiration date, you must request a new code. [employer] Access Code: Activation code not generated  Current [employer] Status: Patient Declined (This is the date your infoBizzt access code will )    4. Enter the last four digits of your Social Security Number (xxxx) and Date of Birth (mm/dd/yyyy) as indicated and click Submit. You will be taken to the next sign-up page. 5. Create a [employer] ID. This will be your [employer] login ID and cannot be changed, so think of one that is secure and easy to remember. 6. Create a [employer] password. You can change your password at any time. 7. Enter your Password Reset Question and Answer. This can be used at a later time if you forget your password. 8. Enter your e-mail address.  You will receive e-mail notification when new information is available in [employer] 9. Click Sign Up. You can now view and download portions of your medical record. 10. Click the Download Summary menu link to download a portable copy of your medical information. Additional Information    If you have questions, please visit the Frequently Asked Questions section of the [employer] website at [email or web address] Remember, [employer] is NOT to be used for urgent needs. For medical emergencies, dial 911. Follow-up Disposition:  Return in about 3 months (around 8/25/2017), or if symptoms worsen or fail to improve. Comments: none                  An After Visit Summary was printed and given to the patient.

## 2017-05-25 NOTE — PATIENT INSTRUCTIONS
[employer] Activation    Thank you for requesting access to [employer]. Please follow the instructions below to securely access and download your online medical record. [employer] allows you to send messages to your doctor, view your test results, renew your prescriptions, schedule appointments, and more. How Do I Sign Up? 1. In your internet browser, go to [email or web address]  2. Click on the First Time User? Click Here link in the Sign In box. You will be redirect to the New Member Sign Up page. 3. Enter your [employer] Access Code exactly as it appears below. You will not need to use this code after youve completed the sign-up process. If you do not sign up before the expiration date, you must request a new code. [employer] Access Code: Activation code not generated  Current [employer] Status: Patient Declined (This is the date your [employer] access code will )    4. Enter the last four digits of your Social Security Number (xxxx) and Date of Birth (mm/dd/yyyy) as indicated and click Submit. You will be taken to the next sign-up page. 5. Create a [employer] ID. This will be your [employer] login ID and cannot be changed, so think of one that is secure and easy to remember. 6. Create a [employer] password. You can change your password at any time. 7. Enter your Password Reset Question and Answer. This can be used at a later time if you forget your password. 8. Enter your e-mail address. You will receive e-mail notification when new information is available in [address] 9. Click Sign Up. You can now view and download portions of your medical record. 10. Click the Download Summary menu link to download a portable copy of your medical information. Additional Information    If you have questions, please visit the Frequently Asked Questions section of the [employer] website at [email or web address] Remember, [employer] is NOT to be used for urgent needs. For medical emergencies, dial 911.

## 2017-07-17 ENCOUNTER — OFFICE VISIT (OUTPATIENT)
Dept: INTERNAL MEDICINE CLINIC | Age: 57
End: 2017-07-17

## 2017-07-17 VITALS
DIASTOLIC BLOOD PRESSURE: 80 MMHG | BODY MASS INDEX: 26.04 KG/M2 | SYSTOLIC BLOOD PRESSURE: 130 MMHG | HEART RATE: 77 BPM | HEIGHT: 71 IN | WEIGHT: 186 LBS | OXYGEN SATURATION: 100 % | RESPIRATION RATE: 14 BRPM

## 2017-07-17 DIAGNOSIS — I87.2 VENOUS INSUFFICIENCY OF BOTH LOWER EXTREMITIES: ICD-10-CM

## 2017-07-17 DIAGNOSIS — L93.0 DISCOID LUPUS: ICD-10-CM

## 2017-07-17 DIAGNOSIS — I10 ESSENTIAL HYPERTENSION, BENIGN: ICD-10-CM

## 2017-07-17 DIAGNOSIS — K21.9 GASTROESOPHAGEAL REFLUX DISEASE WITHOUT ESOPHAGITIS: ICD-10-CM

## 2017-07-17 DIAGNOSIS — M75.102 ROTATOR CUFF SYNDROME OF LEFT SHOULDER: ICD-10-CM

## 2017-07-17 DIAGNOSIS — M48.061 SPINAL STENOSIS OF LUMBAR REGION: ICD-10-CM

## 2017-07-17 DIAGNOSIS — L03.90 CHRONIC CELLULITIS: Primary | ICD-10-CM

## 2017-07-17 DIAGNOSIS — I83.90 VARICOSE VEIN OF LEG: ICD-10-CM

## 2017-07-17 DIAGNOSIS — M54.16 LUMBAR RADICULOPATHY: ICD-10-CM

## 2017-07-17 LAB
CHOLEST SERPL-MCNC: 152 MG/DL
HDLC SERPL-MCNC: 29 MG/DL
LDL CHOLESTEROL POC: 97 MG/DL
NON-HDL CHOLESTEROL, 011976: 123
TCHOL/HDL RATIO (POC): 5.3
TRIGL SERPL-MCNC: 132 MG/DL

## 2017-07-17 NOTE — MR AVS SNAPSHOT
Visit Information Date & Time Provider Department Dept. Phone Encounter #  
 7/17/2017  3:00 PM Dino Cary MD 1404 [facility] [phone] [id number] Follow-up Instructions Return in about 4 weeks (around 8/14/2017), or if symptoms worsen or fail to improve. Your Appointments 8/23/2017  3:00 PM  
ROUTINE CARE with Dino Cary MD  
[facility] - [address] [place] [address] ([address]) Appt Note: 3mo fu  
 [address] [facility] 7 [phone]  
[phone]  
  
   
 [address] [place] [address],[address] [place] [phone] Upcoming Health Maintenance Date Due  
 PAP AKA CERVICAL CYTOLOGY 10/16/2016 FOBT Q 1 YEAR AGE 50-75 2/2/2017 INFLUENZA AGE 9 TO ADULT 8/1/2017 BREAST CANCER SCRN MAMMOGRAM 5/2/2018 DTaP/Tdap/Td series (2 - Td) 11/17/2025 Allergies as of 7/17/2017  Review Complete On: 7/17/2017 By: Carie England LPN No Known Allergies Current Immunizations  Never Reviewed Name Date Influenza Nasal Vaccine 1/3/2017 Tdap 11/17/2015 Not reviewed this visit You Were Diagnosed With   
  
 Codes Comments Chronic cellulitis    -  Primary ICD-10-CM: L03.90 ICD-9-CM: 682.9 Essential hypertension, benign     ICD-10-CM: I10 
ICD-9-CM: 401.1 Discoid lupus     ICD-10-CM: L93.0 ICD-9-CM: 695.4 Gastroesophageal reflux disease without esophagitis     ICD-10-CM: K21.9 ICD-9-CM: 530.81 Lumbar radiculopathy     ICD-10-CM: M54.16 
ICD-9-CM: 724.4 Spinal stenosis of lumbar region     ICD-10-CM: M48.06 
ICD-9-CM: 724.02 Venous insufficiency of both lower extremities     ICD-10-CM: I87.2 ICD-9-CM: 459.81 Varicose vein of leg     ICD-10-CM: I83.90 ICD-9-CM: 454.9 Rotator cuff syndrome of left shoulder     ICD-10-CM: M75.102 ICD-9-CM: 726.10 Vitals BP Pulse Resp Height(growth percentile) Weight(growth percentile) SpO2 130/80 77 14 5' 11\" (1.803 m) 186 lb (84.4 kg) 100% BMI OB Status Smoking Status 25.94 kg/m2 Postmenopausal Former Smoker Vitals History BMI and BSA Data Body Mass Index Body Surface Area  
 25.94 kg/m 2 2.06 m 2 Preferred Pharmacy Pharmacy Name Phone [facility] [address] [phone] Your Updated Medication List  
  
   
This list is accurate as of: 7/17/17  4:01 PM.  Always use your most recent med list. amLODIPine 5 mg tablet Commonly known as:  Bautista Inks Take 1 Tab by mouth daily. bumetanide 1 mg tablet Commonly known as:  Ole Mohan Take 1 Tab by mouth daily. cloNIDine HCl 0.1 mg tablet Commonly known as:  CATAPRES  
TAKE ONE TABLET BY MOUTH TWICE DAILY  
  
 cyclobenzaprine 10 mg tablet Commonly known as:  FLEXERIL Take 1 Tab by mouth two (2) times a day. fluticasone 50 mcg/actuation nasal spray Commonly known as:  Fran Kay 2 Sprays by Both Nostrils route daily. gabapentin 300 mg capsule Commonly known as:  NEURONTIN Take 1 Cap by mouth three (3) times daily. hydroxychloroquine 200 mg tablet Commonly known as:  PLAQUENIL  
  
 * K-DUR 10 mEq tablet Generic drug:  potassium chloride Take  by mouth daily. * potassium chloride 10 mEq tablet Commonly known as:  KLOR-CON Take 2 Tabs by mouth daily. loratadine 10 mg tablet Commonly known as:  Blima [place] Take 1 tablet by mouth daily. losartan 100 mg tablet Commonly known as:  COZAAR  
TAKE ONE TABLET BY MOUTH ONCE DAILY  
  
 naproxen 500 mg tablet Commonly known as:  NAPROSYN Take 1 Tab by mouth two (2) times daily (with meals). predniSONE 10 mg tablet Commonly known as:  DELTASONE  
6 tabs today and reduce by 1 tab daily VITAMIN D2 50,000 unit capsule Generic drug:  ergocalciferol Take 50,000 Units by mouth. * Notice: This list has 2 medication(s) that are the same as other medications prescribed for you. Read the directions carefully, and ask your doctor or other care provider to review them with you. We Performed the Following AMB POC LIPID PROFILE [43388 CPT(R)] Follow-up Instructions Return in about 4 weeks (around 8/14/2017), or if symptoms worsen or fail to improve. Introducing [facility]! Dear Vinod Gould: Thank you for requesting a [employer] account. Our records indicate that you have previously registered for a [employer] account but its currently inactive. Please call our [employer] support line at [phone]. Additional Information If you have questions, please visit the Frequently Asked Questions section of the [employer] website at [email or web address] A [email or web address] Remember, [employer] is NOT to be used for urgent needs. For medical emergencies, dial 911. Now available from your iPhone and Android! Please provide this summary of care documentation to your next provider. Your primary care clinician is listed as Kindra Kang. If you have any questions after today's visit, please call [phone].

## 2017-07-17 NOTE — PROGRESS NOTES
Ibeth Weaver is a 62 y.o. female and presents with Leg Pain (bilateral); Hypertension; and Malaise    Subjective:  She still  has recurrent pains involving the lt. lower leg with associated numbness involving the lt. thigh  She had her MRI of the lower spine that revealed a disc protrution at l4l5 as well as multilevel spinal stenosis  She  had an epidural steroid injection with some relief    Hypertension Review:  The patient has hypertension  Diet and Lifestyle: generally follows a  low sodium diet, exercises sporadically  Home BP Monitoring: is not measured at home. Pertinent ROS: taking medications as instructed, no medication side effects noted, no TIA's, no chest pain on exertion, no dyspnea on exertion, no swelling of ankles. She has recurrent cellulitis of the leg. the problem has been intermittent    She is followed by her lupus doctor. Varicose vein Review:  Ibeth Weaver is a 62 y.o. female and presents with Leg Pain (bilateral); Hypertension; and Malaise   who has numerous bulgy ropey veins in thelegs causing pain/swelling that she has had for years. Patient has tried stockings. The patient's symptons have intermittent      Allergic Rhinitis  Patient presents for evaluation of allergic symptoms. Symptoms include nasal congestion, rhinorrhea, sneezing, eye itching, watery eyes. Precipitants haved included possible pollen. Knee pain Review:  Patient complains of bilaterally  knee pain. Onset of the symptoms was months  ago. Inciting event: longstanding problem which has been stable. Current symptoms include no pain. Aggravating symptoms: going up and down stairs, walking, lateral movements, any weight bearing. Patient's overall course: stable, Patient has had prior knee problems.  Evaluation to date: X-RAYS  Treatment to date:NSAIDS/        Review of Systems  Constitutional: negative for fevers, chills, anorexia and weight loss  Eyes:   visual disturbance and irritation  ENT:   negative for tinnitus,sore throat,nasal congestion,ear pains. hoarseness  Respiratory:   cough,   CV:   negative for chest pain, palpitations, lower extremity edema  GI:   negative for nausea, vomiting, diarrhea, abdominal pain,melena  Endo:               negative for polyuria,polydipsia,polyphagia,heat intolerance  Genitourinary: negative for frequency, dysuria and hematuria  Integument:  negative for rash and pruritus  Hematologic:  negative for easy bruising and gum/nose bleeding  Musculoskel:  myalgias, arthralgias, back pain, , joint pain  Neurological:  negative for headaches, dizziness, vertigo, memory problems and gait   Behavl/Psych: negative for feelings of anxiety, depression, mood changes    Past Medical History:   Diagnosis Date    Anemia, unspecified 2/21/2011    Arthritis, Degenerative,Knee 2/21/2011    Degenerative Disc Disease 2/21/2011    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 No pertinent surgical history.   Social History     Social History    Marital status: SINGLE     Spouse name: N/A    Number of children: N/A    Years of education: N/A     Social History Main Topics    Smoking status: Former Smoker    Smokeless tobacco: Never Used    Alcohol use 0.5 oz/week     1 Glasses of wine per week      Comment: SOCIALLY    Drug use: No    Sexual activity: No     Other Topics Concern    None     Social History Narrative     Family History   Problem Relation Age of Onset    Diabetes Mother     Hypertension Mother     Hypertension Father     Cancer Father      Current Outpatient Prescriptions   Medication Sig Dispense Refill    cloNIDine HCl (CATAPRES) 0.1 mg tablet TAKE ONE TABLET BY MOUTH TWICE DAILY 60 Tab 0    gabapentin (NEURONTIN) 300 mg capsule Take 1 Cap by mouth three (3) times daily. 90 Cap 12    cyclobenzaprine (FLEXERIL) 10 mg tablet Take 1 Tab by mouth two (2) times a day. 60 Tab 12    naproxen (NAPROSYN) 500 mg tablet Take 1 Tab by mouth two (2) times daily (with meals). 60 Tab 3    losartan (COZAAR) 100 mg tablet TAKE ONE TABLET BY MOUTH ONCE DAILY 30 Tab 12    potassium chloride (K-DUR, KLOR-CON) 10 mEq tablet Take 2 Tabs by mouth daily. 30 Tab 12    fluticasone (FLONASE) 50 mcg/actuation nasal spray 2 Sprays by Both Nostrils route daily. 1 Bottle 12    amLODIPine (NORVASC) 5 mg tablet Take 1 Tab by mouth daily. 30 Tab 12    bumetanide (BUMEX) 1 mg tablet Take 1 Tab by mouth daily. 30 Tab 12    ergocalciferol (VITAMIN D2) 50,000 unit capsule Take 50,000 Units by mouth.  hydroxychloroquine (PLAQUENIL) 200 mg tablet       loratadine (CLARITIN) 10 mg tablet Take 1 tablet by mouth daily. 30 tablet 12    potassium chloride (K-DUR) 10 mEq tablet Take  by mouth daily.       predniSONE (DELTASONE) 10 mg tablet 6 tabs today and reduce by 1 tab daily 21 Tab 0     No Known Allergies    Objective:  Visit Vitals    /80    Pulse 77    Resp 14    Ht 5' 11\" (1.803 m)    Wt 186 lb (84.4 kg)    SpO2 100%    BMI 25.94 kg/m2     Physical Exam:   General appearance - alert, well appearing, and in moderate distress  Mental status - alert, oriented to person, place, and time  EYE-KASEY, EOMI, corneas normal, no foreign bodies  ENT-ENT exam normal, no neck nodes or sinus tenderness  Nose - normal and patent, no erythema, discharge or polyps  Mouth - mucous membranes moist, pharynx normal without lesions  Neck - supple, no significant adenopathy   Chest - clear to auscultation, no wheezes, rales or rhonchi, symmetric air entry   Heart - normal rate, regular rhythm, normal S1, S2, no murmurs, rubs, clicks or gallops   Abdomen - soft, nontender, nondistended, no masses or organomegaly  Lymph- no adenopathy palpable  Ext-peripheral pulses normal, no pedal edema, no clubbing or cyanosis  Skin-Warm and dry. no hyperpigmentation, vitiligo, or suspicious lesions  Neuro -alert, oriented, normal speech, no focal findings or movement disorder noted  Neck-normal C-spine, no tenderness, full ROM without pain  Rt. lower leg hyperpigmentatioin,3+edema with erythema noted  Rt.knee-medial joint line tenderness noted  Back-tenderness lower lumbar spine and sacral spine  Lt.shoulder-subdeltoid tenderness  Results for orders placed or performed in visit on 45/66/16   METABOLIC PANEL, COMPREHENSIVE   Result Value Ref Range    Glucose 93 65 - 99 mg/dL    BUN 6 6 - 24 mg/dL    Creatinine 0.76 0.57 - 1.00 mg/dL    GFR est non-AA 88 >59 mL/min/1.73    GFR est  >59 mL/min/1.73    BUN/Creatinine ratio 8 (L) 9 - 23    Sodium 140 136 - 144 mmol/L    Potassium 3.8 3.5 - 5.2 mmol/L    Chloride 97 97 - 106 mmol/L    CO2 29 18 - 29 mmol/L    Calcium 9.0 8.7 - 10.2 mg/dL    Protein, total 9.1 (H) 6.0 - 8.5 g/dL    Albumin 3.8 3.5 - 5.5 g/dL    GLOBULIN, TOTAL 5.3 (H) 1.5 - 4.5 g/dL    A-G Ratio 0.7 (L) 1.1 - 2.5    Bilirubin, total 0.4 0.0 - 1.2 mg/dL    Alk.  phosphatase 148 (H) 39 - 117 IU/L    AST (SGOT) 19 0 - 40 IU/L    ALT (SGPT) 12 0 - 32 IU/L   CBC W/O DIFF   Result Value Ref Range    WBC 4.8 3.4 - 10.8 x10E3/uL    RBC 3.63 (L) 3.77 - 5.28 x10E6/uL    HGB 10.0 (L) 11.1 - 15.9 g/dL    HCT 30.9 (L) 34.0 - 46.6 %    MCV 85 79 - 97 fL    MCH 27.5 26.6 - 33.0 pg    MCHC 32.4 31.5 - 35.7 g/dL    RDW 14.8 12.3 - 15.4 %    PLATELET 790 277 - 624 x10E3/uL   SPECIMEN STATUS REPORT   Result Value Ref Range    SPECIMEN STATUS REPORT COMMENT    PROTEIN ELECTROPHORESIS   Result Value Ref Range    Protein, total 9.0 (H) 6.0 - 8.5 g/dL    Albumin 3.2 2.9 - 4.4 g/dL    Alpha-1-globulin 0.3 0.0 - 0.4 g/dL    ALPHA-2 GLOBULIN 1.0 0.4 - 1.0 g/dL    Beta globulin 1.5 (H) 0.7 - 1.3 g/dL    Gamma globulin 3.1 (H) 0.4 - 1.8 g/dL    M-spike Not Observed Not Observed g/dL    Globulin, total 5.8 (H) 2.2 - 3.9 g/dL    A/G ratio 0.6 (L) 0.7 - 1.7    Please note Comment    IRON PROFILE   Result Value Ref Range    TIBC 241 (L) 250 - 450 ug/dL    UIBC 189 131 - 425 ug/dL    Iron 52 27 - 159 ug/dL    Iron % saturation 22 15 - 55 %   VITAMIN B12 & FOLATE   Result Value Ref Range    Vitamin B12 678 211 - 946 pg/mL    Folate 14.5 >3.0 ng/mL   SPECIMEN STATUS REPORT   Result Value Ref Range    SPECIMEN STATUS REPORT COMMENT        Assessment/Plan:    ICD-10-CM ICD-9-CM    1. Chronic cellulitis L03.90 682.9    2. Essential hypertension, benign I10 401.1 AMB POC LIPID PROFILE   3. 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 Gastroesophageal reflux disease without esophagitis K21.9 530.81    5. Lumbar radiculopathy M54.16 724.4    6. Spinal stenosis of lumbar region M48.06 724.02    7. Venous insufficiency of both lower extremities I87.2 459.81    8. Varicose vein of leg I83.90 454.9    9. Rotator cuff syndrome of left shoulder M75.102 726.10      Orders Placed This Encounter    AMB POC LIPID PROFILE     lose weight, follow low fat diet, follow low salt diet,Take 81mg aspirin daily  There are no Patient Instructions on file for this visit. Follow-up Disposition:  Return in about 4 weeks (around 8/14/2017), or if symptoms worsen or fail to improve. Comments: none                  An After Visit Summary was printed and given to the patient.

## 2017-08-23 ENCOUNTER — OFFICE VISIT (OUTPATIENT)
Dept: INTERNAL MEDICINE CLINIC | Age: 57
End: 2017-08-23

## 2017-08-23 VITALS
OXYGEN SATURATION: 98 % | RESPIRATION RATE: 16 BRPM | HEIGHT: 71 IN | WEIGHT: 184 LBS | TEMPERATURE: 98 F | HEART RATE: 112 BPM | BODY MASS INDEX: 25.76 KG/M2 | DIASTOLIC BLOOD PRESSURE: 86 MMHG | SYSTOLIC BLOOD PRESSURE: 128 MMHG

## 2017-08-23 DIAGNOSIS — L03.90 CHRONIC CELLULITIS: ICD-10-CM

## 2017-08-23 DIAGNOSIS — M50.30 DEGENERATION OF CERVICAL INTERVERTEBRAL DISC: ICD-10-CM

## 2017-08-23 DIAGNOSIS — L93.0 DISCOID LUPUS: ICD-10-CM

## 2017-08-23 DIAGNOSIS — M48.061 SPINAL STENOSIS OF LUMBAR REGION: ICD-10-CM

## 2017-08-23 DIAGNOSIS — I10 ESSENTIAL HYPERTENSION, BENIGN: Primary | ICD-10-CM

## 2017-08-23 RX ORDER — CLONIDINE HYDROCHLORIDE 0.1 MG/1
TABLET ORAL
Qty: 60 TAB | Refills: 3 | Status: SHIPPED | OUTPATIENT
Start: 2017-08-23 | End: 2018-12-03 | Stop reason: SDUPTHER

## 2017-08-23 RX ORDER — NAPROXEN 500 MG/1
500 TABLET ORAL 2 TIMES DAILY WITH MEALS
Qty: 60 TAB | Refills: 6 | Status: SHIPPED | OUTPATIENT
Start: 2017-08-23 | End: 2018-10-03 | Stop reason: SDUPTHER

## 2017-08-23 NOTE — MR AVS SNAPSHOT
Visit Information Date & Time Provider Department Dept. Phone Encounter #  
 8/23/2017  3:00 PM Reji Cano MD [address] [facility] [phone] [id number] Follow-up Instructions Return in about 3 months (around 11/23/2017). Follow-up and Disposition History Upcoming Health Maintenance Date Due  
 PAP AKA CERVICAL CYTOLOGY 10/16/2016 FOBT Q 1 YEAR AGE 50-75 2/2/2017 INFLUENZA AGE 9 TO ADULT 8/1/2017 BREAST CANCER SCRN MAMMOGRAM 7/5/2019 DTaP/Tdap/Td series (2 - Td) 11/17/2025 Allergies as of 8/23/2017  Review Complete On: 8/23/2017 By: Reji Cano MD  
 No Known Allergies Current Immunizations  Never Reviewed Name Date Influenza Nasal Vaccine 1/3/2017 Tdap 11/17/2015 Not reviewed this visit You Were Diagnosed With   
  
 Codes Comments Essential hypertension, benign    -  Primary ICD-10-CM: I10 
ICD-9-CM: 401.1 Degeneration of cervical intervertebral disc     ICD-10-CM: M50.30 ICD-9-CM: 722.4 Discoid lupus     ICD-10-CM: L93.0 ICD-9-CM: 695.4 Chronic cellulitis     ICD-10-CM: L03.90 ICD-9-CM: 682.9 Spinal stenosis of lumbar region     ICD-10-CM: M48.06 
ICD-9-CM: 724.02 Vitals BP Pulse Temp Resp Height(growth percentile) Weight(growth percentile) 128/86 (!) 112 98 °F (36.7 °C) (Oral) 16 5' 11\" (1.803 m) 184 lb (83.5 kg) SpO2 BMI OB Status Smoking Status 98% 25.66 kg/m2 Postmenopausal Former Smoker BMI and BSA Data Body Mass Index Body Surface Area  
 25.66 kg/m 2 2.05 m 2 Preferred Pharmacy Pharmacy Name Phone [facility] [address] , [address] [place] [address] [phone] Your Updated Medication List  
  
   
This list is accurate as of: 8/23/17  4:24 PM.  Always use your most recent med list. amLODIPine 5 mg tablet Commonly known as:  Remonia Grates Take 1 Tab by mouth daily. bumetanide 1 mg tablet Commonly known as:  Sky Kussmaul Take 1 Tab by mouth daily. cloNIDine HCl 0.1 mg tablet Commonly known as:  CATAPRES  
TAKE ONE TABLET BY MOUTH TWICE DAILY  
  
 cyclobenzaprine 10 mg tablet Commonly known as:  FLEXERIL Take 1 Tab by mouth two (2) times a day. fluticasone 50 mcg/actuation nasal spray Commonly known as:  Kaylie Coffee 2 Sprays by Both Nostrils route daily. gabapentin 300 mg capsule Commonly known as:  NEURONTIN Take 1 Cap by mouth three (3) times daily. hydroxychloroquine 200 mg tablet Commonly known as:  PLAQUENIL  
  
 * K-DUR 10 mEq tablet Generic drug:  potassium chloride Take  by mouth daily. * potassium chloride 10 mEq tablet Commonly known as:  KLOR-CON Take 2 Tabs by mouth daily. loratadine 10 mg tablet Commonly known as:  Theone Handing Take 1 tablet by mouth daily. losartan 100 mg tablet Commonly known as:  COZAAR  
TAKE ONE TABLET BY MOUTH ONCE DAILY  
  
 naproxen 500 mg tablet Commonly known as:  NAPROSYN Take 1 Tab by mouth two (2) times daily (with meals). predniSONE 10 mg tablet Commonly known as:  DELTASONE  
6 tabs today and reduce by 1 tab daily VITAMIN D2 50,000 unit capsule Generic drug:  ergocalciferol Take 50,000 Units by mouth. * Notice: This list has 2 medication(s) that are the same as other medications prescribed for you. Read the directions carefully, and ask your doctor or other care provider to review them with you. Prescriptions Sent to Pharmacy Refills  
 naproxen (NAPROSYN) 500 mg tablet 6 Sig: Take 1 Tab by mouth two (2) times daily (with meals). Class: Normal  
 Pharmacy: [address], [address] [place] [address] Ph #: [phone] Route: Oral  
 cloNIDine HCl (CATAPRES) 0.1 mg tablet 3 Sig: TAKE ONE TABLET BY MOUTH TWICE DAILY  Class: Normal  
 Pharmacy: [facility] [address], [address] [facility] #: [phone] We Performed the Following REFERRAL TO NEUROSURGERY [OTL53 Custom] Follow-up Instructions Return in about 3 months (around 2017). Referral Information Referral ID Referred By Referred To  
  
 [id number] Ranjit Brady MD   
   [address] [place], [address] Phone: [phone] Fax: [phone] Visits Status Start Date End Date 1 New Request 17 If your referral has a status of pending review or denied, additional information will be sent to support the outcome of this decision. Patient Instructions [employer] Activation Thank you for requesting access to [employer]. Please follow the instructions below to securely access and download your online medical record. [employer] allows you to send messages to your doctor, view your test results, renew your prescriptions, schedule appointments, and more. How Do I Sign Up? 1. In your internet browser, go to [email or web address] 
2. Click on the First Time User? Click Here link in the Sign In box. You will be redirect to the New Member Sign Up page. 3. Enter your [employer] Access Code exactly as it appears below. You will not need to use this code after youve completed the sign-up process. If you do not sign up before the expiration date, you must request a new code. [employer] Access Code: [email or web address] Expires: 2017  4:22 PM (This is the date your [employer] access code will ) 4. Enter the last four digits of your Social Security Number (xxxx) and Date of Birth (mm/dd/yyyy) as indicated and click Submit. You will be taken to the next sign-up page. 5. Create a [employer] ID. This will be your [employer] login ID and cannot be changed, so think of one that is secure and easy to remember. 6. Create a [employer] password. You can change your password at any time. 7. Enter your Password Reset Question and Answer. This can be used at a later time if you forget your password. 8. Enter your e-mail address. You will receive e-mail notification when new information is available in [address] 9. Click Sign Up. You can now view and download portions of your medical record. 10. Click the Download Summary menu link to download a portable copy of your medical information. Additional Information If you have questions, please visit the Frequently Asked Questions section of the [employer] website at [email or web address] Copier How To/Locomizert/. Remember, [employer] is NOT to be used for urgent needs. For medical emergencies, dial 911. Introducing [facility] [place] [facility] SERVICES! Rolanda Brice introduces [employer] patient portal. Now you can access parts of your medical record, email your doctor's office, and request medication refills online. 1. In your internet browser, go to [email or web address] Copier How To/Locom[employer]ert 2. Click on the First Time User? Click Here link in the Sign In box. You will see the New Member Sign Up page. 3. Enter your [employer] Access Code exactly as it appears below. You will not need to use this code after youve completed the sign-up process. If you do not sign up before the expiration date, you must request a new code. · [employer] Access Code: [email or web address] Expires: 11/21/2017  4:22 PM 
 
4. Enter the last four digits of your Social Security Number (xxxx) and Date of Birth (mm/dd/yyyy) as indicated and click Submit. You will be taken to the next sign-up page. 5. Create a [employer] ID. This will be your [employer] login ID and cannot be changed, so think of one that is secure and easy to remember. 6. Create a [employer] password. You can change your password at any time. 7. Enter your Password Reset Question and Answer. This can be used at a later time if you forget your password. 8. Enter your e-mail address. You will receive e-mail notification when new information is available in [address] 9. Click Sign Up. You can now view and download portions of your medical record. 10. Click the Download Summary menu link to download a portable copy of your medical information. If you have questions, please visit the Frequently Asked Questions section of the [employer] website. Remember, [employer] is NOT to be used for urgent needs. For medical emergencies, dial 911. Now available from your iPhone and Android! Please provide this summary of care documentation to your next provider. Your primary care clinician is listed as Brynn Dial. If you have any questions after today's visit, please call [phone].

## 2017-08-23 NOTE — PROGRESS NOTES
Daniel Melgar is a 62 y.o. female and presents with Hypertension and Allergic Reaction    Subjective:  She still  has recurrent pains involving the lt. lower leg with associated numbness involving the lt. thigh  She had her MRI of the lower spine that revealed a disc protrution at l4l5 as well as multilevel spinal stenosis  She  had an epidural steroid injection with some relief    Hypertension Review:  The patient has hypertension  Diet and Lifestyle: generally follows a  low sodium diet, exercises sporadically  Home BP Monitoring: is not measured at home. Pertinent ROS: taking medications as instructed, no medication side effects noted, no TIA's, no chest pain on exertion, no dyspnea on exertion, no swelling of ankles. She still has recurrent cellulitis of the leg. the problem has been intermittent      Varicose vein Review:  Daniel Melgar is a 62 y.o. female and presents with Hypertension and Allergic Reaction   who has numerous bulgy ropey veins in thelegs causing pain/swelling that she has had for years. Patient has tried stockings. The patient's symptons have intermittent    Knee pain Review:  Patient complains of bilaterally  knee pain. Onset of the symptoms was months  ago. Inciting event: longstanding problem which has been stable. Current symptoms include no pain. Aggravating symptoms: going up and down stairs, walking, lateral movements, any weight bearing. Patient's overall course: stable, Patient has had prior knee problems. Evaluation to date: X-RAYS  Treatment to date:NSAIDS/        Review of Systems  Constitutional: negative for fevers, chills, anorexia and weight loss  Eyes:   visual disturbance and irritation  ENT:   negative for tinnitus,sore throat,nasal congestion,ear pains. hoarseness  Respiratory:   cough,   CV:   negative for chest pain, palpitations, lower extremity edema  GI:   negative for nausea, vomiting, diarrhea, abdominal pain,melena  Endo:               negative for polyuria,polydipsia,polyphagia,heat intolerance  Genitourinary: negative for frequency, dysuria and hematuria  Integument:  negative for rash and pruritus  Hematologic:  negative for easy bruising and gum/nose bleeding  Musculoskel:  myalgias, arthralgias, back pain, , joint pain  Neurological:  negative for headaches, dizziness, vertigo, memory problems and gait   Behavl/Psych: negative for feelings of anxiety, depression, mood changes    Past Medical History:   Diagnosis Date    Anemia, unspecified 2/21/2011    Arthritis, Degenerative,Knee 2/21/2011    Degenerative Disc Disease 2/21/2011    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 No pertinent surgical history. Social History     Social History    Marital status: SINGLE     Spouse name: N/A    Number of children: N/A    Years of education: N/A     Social History Main Topics    Smoking status: Former Smoker    Smokeless tobacco: Never Used    Alcohol use 0.5 oz/week     1 Glasses of wine per week      Comment: SOCIALLY    Drug use: No    Sexual activity: No     Other Topics Concern    None     Social History Narrative     Family History   Problem Relation Age of Onset    Diabetes Mother     Hypertension Mother     Hypertension Father     Cancer Father      Current Outpatient Prescriptions   Medication Sig Dispense Refill    naproxen (NAPROSYN) 500 mg tablet Take 1 Tab by mouth two (2) times daily (with meals). 60 Tab 6    cloNIDine HCl (CATAPRES) 0.1 mg tablet TAKE ONE TABLET BY MOUTH TWICE DAILY 60 Tab 3    gabapentin (NEURONTIN) 300 mg capsule Take 1 Cap by mouth three (3) times daily. 90 Cap 12    cyclobenzaprine (FLEXERIL) 10 mg tablet Take 1 Tab by mouth two (2) times a day.  60 Tab 12    losartan (COZAAR) 100 mg tablet TAKE ONE TABLET BY MOUTH ONCE DAILY 30 Tab 12    potassium chloride (K-DUR, KLOR-CON) 10 mEq tablet Take 2 Tabs by mouth daily. 30 Tab 12    fluticasone (FLONASE) 50 mcg/actuation nasal spray 2 Sprays by Both Nostrils route daily. 1 Bottle 12    amLODIPine (NORVASC) 5 mg tablet Take 1 Tab by mouth daily. 30 Tab 12    bumetanide (BUMEX) 1 mg tablet Take 1 Tab by mouth daily. 30 Tab 12    ergocalciferol (VITAMIN D2) 50,000 unit capsule Take 50,000 Units by mouth.  hydroxychloroquine (PLAQUENIL) 200 mg tablet       loratadine (CLARITIN) 10 mg tablet Take 1 tablet by mouth daily. 30 tablet 12    potassium chloride (K-DUR) 10 mEq tablet Take  by mouth daily.  predniSONE (DELTASONE) 10 mg tablet 6 tabs today and reduce by 1 tab daily 21 Tab 0     No Known Allergies    Objective:  Visit Vitals    /86    Pulse (!) 112    Temp 98 °F (36.7 °C) (Oral)    Resp 16    Ht 5' 11\" (1.803 m)    Wt 184 lb (83.5 kg)    SpO2 98%    BMI 25.66 kg/m2     Physical Exam:   General appearance - alert, well appearing, and in moderate distress  Mental status - alert, oriented to person, place, and time  EYE-KASEY, EOMI, corneas normal, no foreign bodies  ENT-ENT exam normal, no neck nodes or sinus tenderness  Nose - normal and patent, no erythema, discharge or polyps  Mouth - mucous membranes moist, pharynx normal without lesions  Neck - supple, no significant adenopathy   Chest - clear to auscultation, no wheezes, rales or rhonchi, symmetric air entry   Heart - normal rate, regular rhythm, normal S1, S2, no murmurs, rubs, clicks or gallops   Abdomen - soft, nontender, nondistended, no masses or organomegaly  Lymph- no adenopathy palpable  Ext-peripheral pulses normal, no pedal edema, no clubbing or cyanosis  Skin-Warm and dry.  no hyperpigmentation, vitiligo, or suspicious lesions  Neuro -alert, oriented, normal speech, no focal findings or movement disorder noted  Neck-normal C-spine, no tenderness, full ROM without pain  Rt. lower leg hyperpigmentatioin,3+edema with erythema noted  Rt.knee-medial joint line tenderness noted  Back-tenderness lower lumbar spine and sacral spine  Lt.shoulder-subdeltoid tenderness  Results for orders placed or performed in visit on 07/17/17   AMB POC LIPID PROFILE   Result Value Ref Range    Cholesterol (POC) 152     Triglycerides (POC) 132     HDL Cholesterol (POC) 29     Non-HDL Cholesterol 123     LDL Cholesterol (POC) 97 MG/DL    TChol/HDL Ratio (POC) 5.3        Assessment/Plan:    ICD-10-CM ICD-9-CM    1. Essential hypertension, benign I10 401.1 CANCELED: AMB POC LIPID PROFILE   2. Degeneration of cervical intervertebral disc M50.30 722.4 naproxen (NAPROSYN) 500 mg tablet   3. 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 Chronic cellulitis L03.90 682.9    5. Spinal stenosis of lumbar region M48.06 724.02 REFERRAL TO NEUROSURGERY     Orders Placed This Encounter    REFERRAL TO NEUROSURGERY     Referral Priority:   Routine     Referral Type:   Consultation     Referral Reason:   Specialty Services Required     Referred to Provider:   Sameer Gutierrez MD     Number of Visits Requested:   1    naproxen (NAPROSYN) 500 mg tablet     Sig: Take 1 Tab by mouth two (2) times daily (with meals). Dispense:  60 Tab     Refill:  6    cloNIDine HCl (CATAPRES) 0.1 mg tablet     Sig: TAKE ONE TABLET BY MOUTH TWICE DAILY     Dispense:  60 Tab     Refill:  3     lose weight, follow low fat diet, follow low salt diet,Take 81mg aspirin daily  Patient Instructions   [employer] Activation    Thank you for requesting access to [employer]. Please follow the instructions below to securely access and download your online medical record. [employer] allows you to send messages to your doctor, view your test results, renew your prescriptions, schedule appointments, and more. How Do I Sign Up? 1. In your internet browser, go to [email or web address]  2. Click on the First Time User? Click Here link in the Sign In box.  You will be redirect to the New Member Sign Up page. 3. Enter your [employer] Access Code exactly as it appears below. You will not need to use this code after youve completed the sign-up process. If you do not sign up before the expiration date, you must request a new code. [employer] Access Code: [email or web address]  Expires: 2017  4:22 PM (This is the date your [employer] access code will )    4. Enter the last four digits of your Social Security Number (xxxx) and Date of Birth (mm/dd/yyyy) as indicated and click Submit. You will be taken to the next sign-up page. 5. Create a [employer] ID. This will be your [employer] login ID and cannot be changed, so think of one that is secure and easy to remember. 6. Create a [employer] password. You can change your password at any time. 7. Enter your Password Reset Question and Answer. This can be used at a later time if you forget your password. 8. Enter your e-mail address. You will receive e-mail notification when new information is available in [address] 9. Click Sign Up. You can now view and download portions of your medical record. 10. Click the Download Summary menu link to download a portable copy of your medical information. Additional Information    If you have questions, please visit the Frequently Asked Questions section of the [employer] website at [email or web address] Remember, [employer] is NOT to be used for urgent needs. For medical emergencies, dial 911. Follow-up Disposition:  Return in about 3 months (around 2017). Comments: none                  An After Visit Summary was printed and given to the patient.

## 2017-08-23 NOTE — PATIENT INSTRUCTIONS
[employer] Activation    Thank you for requesting access to [employer]. Please follow the instructions below to securely access and download your online medical record. [employer] allows you to send messages to your doctor, view your test results, renew your prescriptions, schedule appointments, and more. How Do I Sign Up? 1. In your internet browser, go to [email or web address]  2. Click on the First Time User? Click Here link in the Sign In box. You will be redirect to the New Member Sign Up page. 3. Enter your [employer] Access Code exactly as it appears below. You will not need to use this code after youve completed the sign-up process. If you do not sign up before the expiration date, you must request a new code. [employer] Access Code: [email or web address]  Expires: 2017  4:22 PM (This is the date your [employer] access code will )    4. Enter the last four digits of your Social Security Number (xxxx) and Date of Birth (mm/dd/yyyy) as indicated and click Submit. You will be taken to the next sign-up page. 5. Create a [employer] ID. This will be your [employer] login ID and cannot be changed, so think of one that is secure and easy to remember. 6. Create a [employer] password. You can change your password at any time. 7. Enter your Password Reset Question and Answer. This can be used at a later time if you forget your password. 8. Enter your e-mail address. You will receive e-mail notification when new information is available in [address] 9. Click Sign Up. You can now view and download portions of your medical record. 10. Click the Download Summary menu link to download a portable copy of your medical information. Additional Information    If you have questions, please visit the Frequently Asked Questions section of the [employer] website at [email or web address] Remember, [employer] is NOT to be used for urgent needs. For medical emergencies, dial 911.

## 2017-11-17 ENCOUNTER — HOSPITAL ENCOUNTER (OUTPATIENT)
Dept: MRI IMAGING | Age: 57
Discharge: HOME | End: 2017-11-17
Attending: NEUROLOGICAL SURGERY
Payer: COMMERCIAL

## 2017-11-17 DIAGNOSIS — M48.061 SPINAL STENOSIS, LUMBAR REGION, WITHOUT NEUROGENIC CLAUDICATION: ICD-10-CM

## 2017-11-17 PROCEDURE — 72148 MRI LUMBAR SPINE W/O DYE: CPT

## 2017-12-07 ENCOUNTER — OFFICE VISIT (OUTPATIENT)
Dept: INTERNAL MEDICINE CLINIC | Age: 57
End: 2017-12-07

## 2017-12-07 VITALS
SYSTOLIC BLOOD PRESSURE: 136 MMHG | BODY MASS INDEX: 26.74 KG/M2 | TEMPERATURE: 98 F | HEIGHT: 71 IN | WEIGHT: 191 LBS | DIASTOLIC BLOOD PRESSURE: 80 MMHG | HEART RATE: 80 BPM | RESPIRATION RATE: 16 BRPM | OXYGEN SATURATION: 99 %

## 2017-12-07 DIAGNOSIS — M54.16 LUMBAR RADICULOPATHY, ACUTE: ICD-10-CM

## 2017-12-07 DIAGNOSIS — M48.061 SPINAL STENOSIS OF LUMBAR REGION, UNSPECIFIED WHETHER NEUROGENIC CLAUDICATION PRESENT: ICD-10-CM

## 2017-12-07 DIAGNOSIS — M51.36 DEGENERATIVE LUMBAR DISC: ICD-10-CM

## 2017-12-07 DIAGNOSIS — I10 ESSENTIAL HYPERTENSION, BENIGN: Primary | ICD-10-CM

## 2017-12-07 LAB
CHOLEST SERPL-MCNC: 164 MG/DL
HDLC SERPL-MCNC: 37 MG/DL
LDL CHOLESTEROL POC: 92 MG/DL
NON-HDL CHOLESTEROL, 011976: 127
TCHOL/HDL RATIO (POC): 4.5
TRIGL SERPL-MCNC: 176 MG/DL

## 2017-12-07 NOTE — PATIENT INSTRUCTIONS
[employer] Activation    Thank you for requesting access to [employer]. Please follow the instructions below to securely access and download your online medical record. [employer] allows you to send messages to your doctor, view your test results, renew your prescriptions, schedule appointments, and more. How Do I Sign Up? 1. In your internet browser, go to [email or web address]  2. Click on the First Time User? Click Here link in the Sign In box. You will be redirect to the New Member Sign Up page. 3. Enter your [employer] Access Code exactly as it appears below. You will not need to use this code after youve completed the sign-up process. If you do not sign up before the expiration date, you must request a new code. [employer] Access Code: [email or web address]  Expires: 3/7/2018  3:15 PM (This is the date your [employer] access code will )    4. Enter the last four digits of your Social Security Number (xxxx) and Date of Birth (mm/dd/yyyy) as indicated and click Submit. You will be taken to the next sign-up page. 5. Create a [employer] ID. This will be your [employer] login ID and cannot be changed, so think of one that is secure and easy to remember. 6. Create a [employer] password. You can change your password at any time. 7. Enter your Password Reset Question and Answer. This can be used at a later time if you forget your password. 8. Enter your e-mail address. You will receive e-mail notification when new information is available in [address] 9. Click Sign Up. You can now view and download portions of your medical record. 10. Click the Download Summary menu link to download a portable copy of your medical information. Additional Information    If you have questions, please visit the Frequently Asked Questions section of the [employer] website at [email or web address]. CELtrak/[email or web address] ([facility])hart/. Remember, [employer] is NOT to be used for urgent needs. For medical emergencies, dial 911.

## 2017-12-07 NOTE — PROGRESS NOTES
Elda Conteh is a 62 y.o. female and presents with Hypertension and Knee Pain    Subjective:  She still  has recurrent pains involving the lt. lower leg with associated numbness involving the lt. thigh  She had her MRI of the lower spine that revealed a disc protrution at l5 s1 as well as multilevel spinal stenosis  She  had an epidural steroid injection with some relief  She has been followed by neurosurgery. Hypertension Review:  The patient has hypertension  Diet and Lifestyle: generally follows a  low sodium diet, exercises sporadically  Home BP Monitoring: is not measured at home. Pertinent ROS: taking medications as instructed, no medication side effects noted, no TIA's, no chest pain on exertion, no dyspnea on exertion, no swelling of ankles. Varicose vein Review:  Elda Conteh is a 62 y.o. female and presents with Hypertension and Knee Pain   who has numerous bulgy ropey veins in thelegs causing pain/swelling that she has had for years. Patient has tried stockings. The patient's symptons have intermittent    Knee pain Review:  Patient complains of rt.  knee pain. Onset of the symptoms was months  ago. Inciting event: longstanding problem which has been stable. Current symptoms include no pain. Aggravating symptoms: going up and down stairs, walking, lateral movements, any weight bearing. Patient's overall course: stable, Patient has had prior knee problems. Evaluation to date: X-RAYS  Treatment to date:NSAIDS/        Review of Systems  Constitutional: negative for fevers, chills, anorexia and weight loss  Eyes:   visual disturbance and irritation  ENT:   negative for tinnitus,sore throat,nasal congestion,ear pains. hoarseness  Respiratory:   cough,   CV:   negative for chest pain, palpitations, lower extremity edema  GI:   negative for nausea, vomiting, diarrhea, abdominal pain,melena  Endo:               negative for polyuria,polydipsia,polyphagia,heat intolerance  Genitourinary: negative for frequency, dysuria and hematuria  Integument:  negative for rash and pruritus  Hematologic:  negative for easy bruising and gum/nose bleeding  Musculoskel:  myalgias, arthralgias, back pain, , joint pain  Neurological:  negative for headaches, dizziness, vertigo, memory problems and gait   Behavl/Psych: negative for feelings of anxiety, depression, mood changes    Past Medical History:   Diagnosis Date    Anemia, unspecified 2/21/2011    Arthritis, Degenerative,Knee 2/21/2011    Degenerative Disc Disease 2/21/2011    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 No pertinent surgical history. Social History     Social History    Marital status: SINGLE     Spouse name: N/A    Number of children: N/A    Years of education: N/A     Social History Main Topics    Smoking status: Former Smoker    Smokeless tobacco: Never Used    Alcohol use 0.5 oz/week     1 Glasses of wine per week      Comment: SOCIALLY    Drug use: No    Sexual activity: No     Other Topics Concern    None     Social History Narrative     Family History   Problem Relation Age of Onset    Diabetes Mother     Hypertension Mother     Hypertension Father     Cancer Father      Current Outpatient Prescriptions   Medication Sig Dispense Refill    naproxen (NAPROSYN) 500 mg tablet Take 1 Tab by mouth two (2) times daily (with meals). 60 Tab 6    cloNIDine HCl (CATAPRES) 0.1 mg tablet TAKE ONE TABLET BY MOUTH TWICE DAILY 60 Tab 3    gabapentin (NEURONTIN) 300 mg capsule Take 1 Cap by mouth three (3) times daily. 90 Cap 12    cyclobenzaprine (FLEXERIL) 10 mg tablet Take 1 Tab by mouth two (2) times a day.  60 Tab 12    losartan (COZAAR) 100 mg tablet TAKE ONE TABLET BY MOUTH ONCE DAILY 30 Tab 12    potassium chloride (K-DUR, KLOR-CON) 10 mEq tablet Take 2 Tabs by mouth daily. 30 Tab 12    fluticasone (FLONASE) 50 mcg/actuation nasal spray 2 Sprays by Both Nostrils route daily. 1 Bottle 12    amLODIPine (NORVASC) 5 mg tablet Take 1 Tab by mouth daily. 30 Tab 12    bumetanide (BUMEX) 1 mg tablet Take 1 Tab by mouth daily. 30 Tab 12    ergocalciferol (VITAMIN D2) 50,000 unit capsule Take 50,000 Units by mouth.  hydroxychloroquine (PLAQUENIL) 200 mg tablet       loratadine (CLARITIN) 10 mg tablet Take 1 tablet by mouth daily. 30 tablet 12    potassium chloride (K-DUR) 10 mEq tablet Take  by mouth daily.  predniSONE (DELTASONE) 10 mg tablet 6 tabs today and reduce by 1 tab daily 21 Tab 0     No Known Allergies    Objective:  Visit Vitals    /80    Pulse 80    Temp 98 °F (36.7 °C) (Oral)    Resp 16    Ht 5' 11\" (1.803 m)    Wt 191 lb (86.6 kg)    SpO2 99%    BMI 26.64 kg/m2     Physical Exam:   General appearance - alert, well appearing, and in moderate distress  Mental status - alert, oriented to person, place, and time  EYE-KASEY, EOMI, corneas normal, no foreign bodies  ENT-ENT exam normal, no neck nodes or sinus tenderness  Nose - normal and patent, no erythema, discharge or polyps  Mouth - mucous membranes moist, pharynx normal without lesions  Neck - supple, no significant adenopathy   Chest - clear to auscultation, no wheezes, rales or rhonchi, symmetric air entry   Heart - normal rate, regular rhythm, normal S1, S2, no murmurs, rubs, clicks or gallops   Abdomen - soft, nontender, nondistended, no masses or organomegaly  Lymph- no adenopathy palpable  Ext-peripheral pulses normal, no pedal edema, no clubbing or cyanosis  Skin-Warm and dry. no hyperpigmentation, vitiligo, or suspicious lesions  Neuro -alert, oriented, normal speech, no focal findings or movement disorder noted  Neck-normal C-spine, no tenderness, full ROM without pain  Rt. lower leg hyperpigmentatioin,3+edema with erythema noted  Rt.knee-medial joint line tenderness noted  Back-tenderness lower lumbar spine and sacral spine    Results for orders placed or performed in visit on 12/07/17   AMB POC LIPID PROFILE   Result Value Ref Range    Cholesterol (POC) 164     Triglycerides (POC) 176     HDL Cholesterol (POC) 37     Non-HDL Cholesterol 127     LDL Cholesterol (POC) 92 MG/DL    TChol/HDL Ratio (POC) 4.5        Assessment/Plan:    ICD-10-CM ICD-9-CM    1. Essential hypertension, benign I10 401.1 AMB POC LIPID PROFILE   2. Spinal stenosis of lumbar region, unspecified whether neurogenic claudication present M48.061 724.02    3. Lumbar radiculopathy, acute M54.16 724.4    4. Degenerative lumbar disc M51.36 722.52      Orders Placed This Encounter    AMB POC LIPID PROFILE     lose weight, follow low fat diet, follow low salt diet,Take 81mg aspirin daily  Indications:   Symptomatic relief of pain    Procedure:  After consent was obtained, using sterile technique the right knee joint was prepped using alcohol. Local anesthetic used: 1% lidocaine. . The joint was entered Kenalog 40 mg was mixed with 1% lidocaine 3 ml  and injected into the joint and the needle withdrawn. The procedure was well tolerated. The patient is asked to continue to rest the joint for a few more days before resuming regular activities. It may be more painful for the first 1-2 days. Watch for fever, or increased swelling or persistent pain in the joint. Call or return to clinic prn if such symptoms occur or there is failure to improve as anticipated.     [facility]  OFFICE PROCEDURE PROGRESS NOTE        Chart reviewed for the following:   Jordy Liang MD, have reviewed the History, Physical and updated the Allergic reactions for [address] performed immediately prior to start of procedure:   Jordy Liang MD, have performed the following reviews on [facility] prior to the start of the procedure:            * Patient was identified by name and date of birth   * Agreement on procedure being performed was verified  * Risks and Benefits explained to the patient  * Procedure site verified and marked as necessary  * Patient was positioned for comfort       Time: 3:12pm        Date of procedure: 2017    Procedure performed by:  Cal Lozano MD    Patient assisted by: self    How tolerated by patient: tolerated the procedure well with no complications    Comments: none                Patient Instructions   MyChart Activation    Thank you for requesting access to [employer]. Please follow the instructions below to securely access and download your online medical record. [employer] allows you to send messages to your doctor, view your test results, renew your prescriptions, schedule appointments, and more. How Do I Sign Up? 1. In your internet browser, go to [email or web address].[email or web address]  2. Click on the First Time User? Click Here link in the Sign In box. You will be redirect to the New Member Sign Up page. 3. Enter your [employer] Access Code exactly as it appears below. You will not need to use this code after youve completed the sign-up process. If you do not sign up before the expiration date, you must request a new code. [employer] Access Code: [email or web address]  Expires: 3/7/2018  3:15 PM (This is the date your [employer] access code will )    4. Enter the last four digits of your Social Security Number (xxxx) and Date of Birth (mm/dd/yyyy) as indicated and click Submit. You will be taken to the next sign-up page. 5. Create a [employer] ID. This will be your [employer] login ID and cannot be changed, so think of one that is secure and easy to remember. 6. Create a [employer] password. You can change your password at any time. 7. Enter your Password Reset Question and Answer. This can be used at a later time if you forget your password. 8. Enter your e-mail address.  You will receive e-mail notification when new information is available in [address] 9. Click Sign Up. You can now view and download portions of your medical record. 10. Click the Download Summary menu link to download a portable copy of your medical information. Additional Information    If you have questions, please visit the Frequently Asked Questions section of the [employer] website at [email or web address] Remember, [employer] is NOT to be used for urgent needs. For medical emergencies, dial 911. Follow-up Disposition:  Return in about 4 weeks (around 1/4/2018), or if symptoms worsen or fail to improve. Comments: none                  An After Visit Summary was printed and given to the patient.

## 2017-12-07 NOTE — MR AVS SNAPSHOT
Visit Information Date & Time Provider Department Dept. Phone Encounter #  
 12/7/2017  2:00 PM Kate Puri MD [address] [facility] [phone] [id number] Follow-up Instructions Return in about 4 weeks (around 1/4/2018), or if symptoms worsen or fail to improve. Follow-up and Disposition History Upcoming Health Maintenance Date Due  
 PAP AKA CERVICAL CYTOLOGY 10/16/2016 FOBT Q 1 YEAR AGE 50-75 2/2/2017 BREAST CANCER SCRN MAMMOGRAM 7/5/2019 DTaP/Tdap/Td series (2 - Td) 11/17/2025 Allergies as of 12/7/2017  Review Complete On: 12/7/2017 By: April Kira Coker LPN No Known Allergies Current Immunizations  Never Reviewed Name Date Influenza Nasal Vaccine 1/3/2017 Tdap 11/17/2015 Not reviewed this visit You Were Diagnosed With   
  
 Codes Comments Essential hypertension, benign    -  Primary ICD-10-CM: I10 
ICD-9-CM: 401.1 Spinal stenosis of lumbar region, unspecified whether neurogenic claudication present     ICD-10-CM: M48.061 
ICD-9-CM: 724.02 Lumbar radiculopathy, acute     ICD-10-CM: M54.16 
ICD-9-CM: 724.4 Degenerative lumbar disc     ICD-10-CM: M51.36 
ICD-9-CM: 722.52 Vitals BP Pulse Temp Resp Height(growth percentile) Weight(growth percentile) 136/80 80 98 °F (36.7 °C) (Oral) 16 5' 11\" (1.803 m) 191 lb (86.6 kg) SpO2 BMI OB Status Smoking Status 99% 26.64 kg/m2 Postmenopausal Former Smoker BMI and BSA Data Body Mass Index Body Surface Area  
 26.64 kg/m 2 2.08 m 2 Preferred Pharmacy Pharmacy Name Phone [facility] [address] , [address] [place] [address] [phone] Your Updated Medication List  
  
   
This list is accurate as of: 12/7/17  3:17 PM.  Always use your most recent med list. amLODIPine 5 mg tablet Commonly known as:  Cox Mullet Take 1 Tab by mouth daily. bumetanide 1 mg tablet Commonly known as:  Royal Hutching Take 1 Tab by mouth daily. cloNIDine HCl 0.1 mg tablet Commonly known as:  CATAPRES  
TAKE ONE TABLET BY MOUTH TWICE DAILY  
  
 cyclobenzaprine 10 mg tablet Commonly known as:  FLEXERIL Take 1 Tab by mouth two (2) times a day. fluticasone 50 mcg/actuation nasal spray Commonly known as:  Latrice Shames 2 Sprays by Both Nostrils route daily. gabapentin 300 mg capsule Commonly known as:  NEURONTIN Take 1 Cap by mouth three (3) times daily. hydroxychloroquine 200 mg tablet Commonly known as:  PLAQUENIL  
  
 * K-DUR 10 mEq tablet Generic drug:  potassium chloride Take  by mouth daily. * potassium chloride 10 mEq tablet Commonly known as:  KLOR-CON Take 2 Tabs by mouth daily. loratadine 10 mg tablet Commonly known as:  Safia Hunting Take 1 tablet by mouth daily. losartan 100 mg tablet Commonly known as:  COZAAR  
TAKE ONE TABLET BY MOUTH ONCE DAILY  
  
 naproxen 500 mg tablet Commonly known as:  NAPROSYN Take 1 Tab by mouth two (2) times daily (with meals). predniSONE 10 mg tablet Commonly known as:  DELTASONE  
6 tabs today and reduce by 1 tab daily VITAMIN D2 50,000 unit capsule Generic drug:  ergocalciferol Take 50,000 Units by mouth. * Notice: This list has 2 medication(s) that are the same as other medications prescribed for you. Read the directions carefully, and ask your doctor or other care provider to review them with you. We Performed the Following AMB POC LIPID PROFILE [74623 CPT(R)] Follow-up Instructions Return in about 4 weeks (around 1/4/2018), or if symptoms worsen or fail to improve. Patient Instructions [employer] Activation Thank you for requesting access to MEDSEEK. Please follow the instructions below to securely access and download your online medical record.  MEDSEEK allows you to send messages to your doctor, view your test results, renew your prescriptions, schedule appointments, and more. How Do I Sign Up? 1. In your internet browser, go to [email or web address] 
2. Click on the First Time User? Click Here link in the Sign In box. You will be redirect to the New Member Sign Up page. 3. Enter your Exos Access Code exactly as it appears below. You will not need to use this code after youve completed the sign-up process. If you do not sign up before the expiration date, you must request a new code. Exos Access Code: [email or web address] Expires: 3/7/2018  3:15 PM (This is the date your Exos access code will ) 4. Enter the last four digits of your Social Security Number (xxxx) and Date of Birth (mm/dd/yyyy) as indicated and click Submit. You will be taken to the next sign-up page. 5. Create a Exos ID. This will be your Exos login ID and cannot be changed, so think of one that is secure and easy to remember. 6. Create a Exos password. You can change your password at any time. 7. Enter your Password Reset Question and Answer. This can be used at a later time if you forget your password. 8. Enter your e-mail address. You will receive e-mail notification when new information is available in [address] 9. Click Sign Up. You can now view and download portions of your medical record. 10. Click the Download Summary menu link to download a portable copy of your medical information. Additional Information If you have questions, please visit the Frequently Asked Questions section of the Exos website at [email or web address] Remember, Exos is NOT to be used for urgent needs. For medical emergencies, dial 911. Introducing [facility]! Stewart Holbrook introduces Exos patient portal. Now you can access parts of your medical record, email your doctor's office, and request medication refills online. 1. In your internet browser, go to [email or web address] 2. Click on the First Time User? Click Here link in the Sign In box. You will see the New Member Sign Up page. 3. Enter your Kadoink Access Code exactly as it appears below. You will not need to use this code after youve completed the sign-up process. If you do not sign up before the expiration date, you must request a new code. · Kadoink Access Code: [email or web address] Expires: 3/7/2018  3:15 PM 
 
4. Enter the last four digits of your Social Security Number (xxxx) and Date of Birth (mm/dd/yyyy) as indicated and click Submit. You will be taken to the next sign-up page. 5. Create a Kadoink ID. This will be your Kadoink login ID and cannot be changed, so think of one that is secure and easy to remember. 6. Create a Kadoink password. You can change your password at any time. 7. Enter your Password Reset Question and Answer. This can be used at a later time if you forget your password. 8. Enter your e-mail address. You will receive e-mail notification when new information is available in [address] 9. Click Sign Up. You can now view and download portions of your medical record. 10. Click the Download Summary menu link to download a portable copy of your medical information. If you have questions, please visit the Frequently Asked Questions section of the Kadoink website. Remember, Kadoink is NOT to be used for urgent needs. For medical emergencies, dial 911. Now available from your iPhone and Android! Please provide this summary of care documentation to your next provider. Your primary care clinician is listed as Arlette June. If you have any questions after today's visit, please call [phone].

## 2017-12-08 RX ORDER — TRIAMCINOLONE ACETONIDE 40 MG/ML
40 INJECTION, SUSPENSION INTRA-ARTICULAR; INTRAMUSCULAR ONCE
Qty: 1 ML | Refills: 0
Start: 2017-12-08 | End: 2017-12-08

## 2017-12-11 DIAGNOSIS — R60.9 PERIPHERAL EDEMA: ICD-10-CM

## 2017-12-11 RX ORDER — BUMETANIDE 1 MG/1
TABLET ORAL
Qty: 30 TAB | Refills: 12 | Status: SHIPPED | OUTPATIENT
Start: 2017-12-11 | End: 2018-12-04 | Stop reason: SDUPTHER

## 2018-01-04 ENCOUNTER — DOCUMENTATION ONLY (OUTPATIENT)
Dept: INTERNAL MEDICINE CLINIC | Age: 58
End: 2018-01-04

## 2018-01-04 NOTE — PROGRESS NOTES
Simona Hazard [id number]  60 kenalog injection of the right knee on 17 - timout and consent both present

## 2018-02-16 DIAGNOSIS — I10 ESSENTIAL HYPERTENSION: ICD-10-CM

## 2018-02-16 RX ORDER — POTASSIUM CHLORIDE 750 MG/1
TABLET, EXTENDED RELEASE ORAL
Qty: 30 TAB | Refills: 12 | Status: SHIPPED | COMMUNITY
Start: 2018-02-16 | End: 2018-11-19 | Stop reason: ALTCHOICE

## 2018-02-16 RX ORDER — AMLODIPINE BESYLATE 5 MG/1
TABLET ORAL
Qty: 30 TAB | Refills: 12 | Status: SHIPPED | COMMUNITY
Start: 2018-02-16 | End: 2018-10-22 | Stop reason: DRUGHIGH

## 2018-04-02 DIAGNOSIS — M50.30 DEGENERATIVE DISC DISEASE, CERVICAL: ICD-10-CM

## 2018-04-03 RX ORDER — LOSARTAN POTASSIUM 100 MG/1
TABLET ORAL
Qty: 30 TAB | Refills: 12 | Status: SHIPPED | OUTPATIENT
Start: 2018-04-03 | End: 2018-04-04 | Stop reason: SDUPTHER

## 2018-04-03 RX ORDER — CYCLOBENZAPRINE HCL 10 MG
TABLET ORAL
Qty: 60 TAB | Refills: 12 | Status: SHIPPED | OUTPATIENT
Start: 2018-04-03 | End: 2021-06-22 | Stop reason: ALTCHOICE

## 2018-04-05 RX ORDER — LOSARTAN POTASSIUM 100 MG/1
TABLET ORAL
Qty: 90 TAB | Refills: 3 | Status: SHIPPED | OUTPATIENT
Start: 2018-04-05 | End: 2019-05-01 | Stop reason: SDUPTHER

## 2018-10-03 DIAGNOSIS — M50.30 DEGENERATION OF CERVICAL INTERVERTEBRAL DISC: ICD-10-CM

## 2018-10-04 RX ORDER — NAPROXEN 500 MG/1
TABLET ORAL
Qty: 60 TAB | Refills: 6 | Status: SHIPPED | OUTPATIENT
Start: 2018-10-04 | End: 2020-03-09

## 2018-12-04 RX ORDER — CLONIDINE HYDROCHLORIDE 0.1 MG/1
TABLET ORAL
Qty: 60 TAB | Refills: 3 | Status: SHIPPED | OUTPATIENT
Start: 2018-12-04 | End: 2018-12-04 | Stop reason: SDUPTHER

## 2018-12-18 LAB — COLONOSCOPY, EXTERNAL: NORMAL

## 2019-01-15 ENCOUNTER — OFFICE VISIT (OUTPATIENT)
Dept: INTERNAL MEDICINE CLINIC | Age: 59
End: 2019-01-15

## 2019-01-15 VITALS
OXYGEN SATURATION: 100 % | HEART RATE: 71 BPM | DIASTOLIC BLOOD PRESSURE: 70 MMHG | RESPIRATION RATE: 20 BRPM | HEIGHT: 71 IN | BODY MASS INDEX: 26.6 KG/M2 | TEMPERATURE: 97.9 F | WEIGHT: 190 LBS | SYSTOLIC BLOOD PRESSURE: 140 MMHG

## 2019-01-15 DIAGNOSIS — M54.16 LUMBAR RADICULOPATHY, ACUTE: ICD-10-CM

## 2019-01-15 DIAGNOSIS — I10 ESSENTIAL HYPERTENSION, BENIGN: Primary | ICD-10-CM

## 2019-01-15 NOTE — PATIENT INSTRUCTIONS
[email or web address] Activation Thank you for requesting access to [email or web address]. Please follow the instructions below to securely access and download your online medical record. [email or web address] allows you to send messages to your doctor, view your test results, renew your prescriptions, schedule appointments, and more. How Do I Sign Up? 1. In your internet browser, go to [email or web address] 
2. Click on the First Time User? Click Here link in the Sign In box. You will be redirect to the New Member Sign Up page. 3. Enter your [email or web address].com Access Code exactly as it appears below. You will not need to use this code after youve completed the sign-up process. If you do not sign up before the expiration date, you must request a new code. [email or web address] Access Code: [email or web address] Expires: 3/1/2019  3:24 PM (This is the date your [email or web address] access code will ) 4. Enter the last four digits of your Social Security Number (xxxx) and Date of Birth (mm/dd/yyyy) as indicated and click Submit. You will be taken to the next sign-up page. 5. Create a [email or web address] ID. This will be your [email or web address] login ID and cannot be changed, so think of one that is secure and easy to remember. 6. Create a [email or web address] password. You can change your password at any time. 7. Enter your Password Reset Question and Answer. This can be used at a later time if you forget your password. 8. Enter your e-mail address. You will receive e-mail notification when new information is available in [address] 9. Click Sign Up. You can now view and download portions of your medical record. 10. Click the Download Summary menu link to download a portable copy of your medical information. Additional Information If you have questions, please visit the Frequently Asked Questions section of the [email or web address] website at [email or web address] [email or web address] Remember, [email or web address] is NOT to be used for urgent needs. For medical emergencies, dial 911.

## 2019-01-15 NOTE — PROGRESS NOTES
1. Have you been to the ER, urgent care clinic since your last visit? Hospitalized since your last visit?no 2. Have you seen or consulted any other health care providers outside of the [address] [place] [address] since your last visit? Include any pap smears or colon screening. No 
PHQ over the last two weeks 1/15/2019 PHQ Not Done - Little interest or pleasure in doing things Not at all Feeling down, depressed, irritable, or hopeless Not at all Total Score PHQ 2 0

## 2019-01-15 NOTE — PROGRESS NOTES
Casa Reynolds is a 62 y.o. female and presents with Back Pain and Lupus Florence Huffman Subjective: Hypertension Review: 
The patient has essential hypertension Diet and Lifestyle: generally follows a  low sodium diet, exercises sporadically Home BP Monitoring: is not measured at home. Pertinent ROS: taking medications as instructed, no medication side effects noted, no TIA's, no chest pain on exertion, no dyspnea on exertion, no swelling of ankles. Back Pain Review: 
Patient presents for evaluation of low back problems. Symptoms have been present for several weeks and include pain in lower back (dull, mild in character; /10 in severity). Initial inciting event: unknown. Symptoms are worst: at times. Alleviating factors identifiable by patient are lying flat, medication . Exacerbating factors identifiable by patient are bending forwards, bending backwards. Treatments so far initiated by patient: medication Previous lower back problems: reported. Previous workup:surgery. She has numbness in the lt. lower leg. She has discoid lupus Review of Systems Constitutional: negative for fevers, chills, anorexia and weight loss Eyes:   negative for visual disturbance and irritation ENT:   negative for tinnitus,sore throat,nasal congestion,ear pains. hoarseness Respiratory:  negative for cough, hemoptysis, dyspnea,wheezing CV:   negative for chest pain, palpitations, lower extremity edema GI:   negative for nausea, vomiting, diarrhea, abdominal pain,melena Endo:               negative for polyuria,polydipsia,polyphagia,heat intolerance Genitourinary: negative for frequency, dysuria and hematuria Integument:  negative for rash and pruritus Hematologic:  negative for easy bruising and gum/nose bleeding Musculoskel: negative for myalgias, arthralgias, back pain, muscle weakness, joint pain Neurological:  negative for headaches, dizziness, vertigo, memory problems and gait Behavl/Psych: negative for feelings of anxiety, depression, mood changes Past Medical History:  
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 No pertinent surgical history. Social History Socioeconomic History  Marital status: UNKNOWN Spouse name: Not on file  Number of children: Not on file  Years of education: Not on file  Highest education level: Not on file Tobacco Use  Smoking status: Former Smoker  Smokeless tobacco: Never Used Substance and Sexual Activity  Alcohol use: Yes Alcohol/week: 0.5 oz Types: 1 Glasses of wine per week Comment: SOCIALLY  Drug use: No  
 Sexual activity: No  
 
Family History Problem Relation Age of Onset  Diabetes Mother  Hypertension Mother  Hypertension Father  Cancer Father Current Outpatient Medications Medication Sig Dispense Refill  cloNIDine HCl (CATAPRES) 0.1 mg tablet TAKE ONE TABLET BY MOUTH TWICE DAILY 60 Tab 3  
 bumetanide (BUMEX) 1 mg tablet TAKE ONE TABLET BY MOUTH ONCE DAILY 30 Tab 12  
 potassium chloride (KLOR-CON) 10 mEq tablet Take 2 Tabs by mouth daily. 60 Tab 5  
 amLODIPine (NORVASC) 10 mg tablet Take 1 Tab by mouth daily. 30 Tab 3  
 naproxen (NAPROSYN) 500 mg tablet TAKE ONE TABLET BY MOUTH TWICE DAILY WITH  A  MEAL 60 Tab 6  
 losartan (COZAAR) 100 mg tablet TAKE ONE TABLET BY MOUTH ONCE DAILY 90 Tab 3  cyclobenzaprine (FLEXERIL) 10 mg tablet TAKE ONE TABLET BY MOUTH TWICE DAILY 60 Tab 12  
 ergocalciferol (VITAMIN D2) 50,000 unit capsule Take 50,000 Units by mouth.  hydroxychloroquine (PLAQUENIL) 200 mg tablet Take 400 mg by mouth daily.  gabapentin (NEURONTIN) 300 mg capsule Take 1 Cap by mouth three (3) times daily. 90 Cap 12 No Known Allergies Objective: 
Visit Vitals /70 (BP 1 Location: Right arm, BP Patient Position: Sitting) Pulse 71 Temp 97.9 °F (36.6 °C) (Oral) Resp 20 Ht 5' 11\" (1.803 m) Wt 190 lb (86.2 kg) SpO2 100% BMI 26.50 kg/m² Physical Exam:  
General appearance - alert, well appearing, and in no distress Mental status - alert, oriented to person, place, and time EYE-KASEY, EOMI, corneas normal, no foreign bodies ENT-ENT exam normal, no neck nodes or sinus tenderness Nose - normal and patent, no erythema, discharge or polyps Mouth - mucous membranes moist, pharynx normal without lesions Neck - supple, no significant adenopathy Chest - clear to auscultation, no wheezes, rales or rhonchi, symmetric air entry Heart - normal rate, regular rhythm, normal S1, S2, no murmurs, rubs, clicks or gallops Abdomen - soft, nontender, nondistended, no masses or organomegaly Lymph- no adenopathy palpable Ext-peripheral pulses normal, no pedal edema, no clubbing or cyanosis Skin-Warm and dry. no hyperpigmentation, vitiligo, or suspicious lesions Neuro -alert, oriented, normal speech, no focal findings or movement disorder noted Neck-normal C-spine, no tenderness, full ROM without pain Feet-no nail deformities or callus formation with good pulses noted Results for orders placed or performed in visit on 11/19/18 METABOLIC PANEL, BASIC Result Value Ref Range Glucose 84 65 - 99 mg/dL BUN 10 6 - 24 mg/dL Creatinine 0.72 0.57 - 1.00 mg/dL GFR est non-AA 93 >59 mL/min/1.73 GFR est  >59 mL/min/1.73  
 BUN/Creatinine ratio 14 9 - 23 Sodium 143 134 - 144 mmol/L Potassium 3.6 3.5 - 5.2 mmol/L Chloride 103 96 - 106 mmol/L  
 CO2 27 20 - 29 mmol/L Calcium 9.3 8.7 - 10.2 mg/dL Assessment/Plan: ICD-10-CM ICD-9-CM 1. Essential hypertension, benign I10 401.1 2. Lumbar radiculopathy, acute M54.16 724.4 No orders of the defined types were placed in this encounter. lose weight, follow low fat diet, follow low salt diet, continue present plan,Take 81mg aspirin daily Patient Instructions MyChart Activation Thank you for requesting access to [facility]. Please follow the instructions below to securely access and download your online medical record. [facility] allows you to send messages to your doctor, view your test results, renew your prescriptions, schedule appointments, and more. How Do I Sign Up? 1. In your internet browser, go to [email or web address] 
2. Click on the First Time User? Click Here link in the Sign In box. You will be redirect to the New Member Sign Up page. 3. Enter your [facility] Access Code exactly as it appears below. You will not need to use this code after youve completed the sign-up process. If you do not sign up before the expiration date, you must request a new code. [facility] Access Code: [email or web address] Expires: 3/1/2019  3:24 PM (This is the date your [facility] access code will ) 4. Enter the last four digits of your Social Security Number (xxxx) and Date of Birth (mm/dd/yyyy) as indicated and click Submit. You will be taken to the next sign-up page. 5. Create a [facility] ID. This will be your [facility] login ID and cannot be changed, so think of one that is secure and easy to remember. 6. Create a [facility] password. You can change your password at any time. 7. Enter your Password Reset Question and Answer. This can be used at a later time if you forget your password. 8. Enter your e-mail address. You will receive e-mail notification when new information is available in [address] 9. Click Sign Up. You can now view and download portions of your medical record.  
10. Click the Download Summary menu link to download a portable copy of your medical information. Additional Information If you have questions, please visit the Frequently Asked Questions section of the [employer] website at [email or web address]/[email or web address]/. Remember, [facility] is NOT to be used for urgent needs. For medical emergencies, dial 911. Follow-up Disposition: 
Return in about 3 months (around 4/15/2019), or if symptoms worsen or fail to improve. I have reviewed with the patient details of the assessment and plan and all questions were answered. Relevent patient education was performed. The most recent lab findings were reviewed with the patient. An After Visit Summary was printed and given to the patient.

## 2019-04-16 ENCOUNTER — OFFICE VISIT (OUTPATIENT)
Dept: INTERNAL MEDICINE CLINIC | Age: 59
End: 2019-04-16

## 2019-04-16 VITALS
RESPIRATION RATE: 16 BRPM | HEIGHT: 71 IN | BODY MASS INDEX: 28.28 KG/M2 | WEIGHT: 202 LBS | TEMPERATURE: 98.6 F | OXYGEN SATURATION: 97 % | DIASTOLIC BLOOD PRESSURE: 80 MMHG | HEART RATE: 93 BPM | SYSTOLIC BLOOD PRESSURE: 136 MMHG

## 2019-04-16 DIAGNOSIS — J20.9 ACUTE BRONCHITIS, UNSPECIFIED ORGANISM: ICD-10-CM

## 2019-04-16 DIAGNOSIS — M54.16 LUMBAR RADICULOPATHY, ACUTE: Primary | ICD-10-CM

## 2019-04-16 DIAGNOSIS — I10 ESSENTIAL HYPERTENSION, BENIGN: ICD-10-CM

## 2019-04-16 LAB
CHOLEST SERPL-MCNC: 131 MG/DL
HDLC SERPL-MCNC: 32 MG/DL
LDL CHOLESTEROL POC: 85 MG/DL
NON-HDL CHOLESTEROL, 011976: 99
TCHOL/HDL RATIO (POC): 4.1
TRIGL SERPL-MCNC: 70 MG/DL

## 2019-04-16 RX ORDER — FLUTICASONE PROPIONATE 50 MCG
2 SPRAY, SUSPENSION (ML) NASAL DAILY
Qty: 1 BOTTLE | Refills: 12 | Status: SHIPPED | OUTPATIENT
Start: 2019-04-16

## 2019-04-16 RX ORDER — FAMOTIDINE 20 MG/1
20 TABLET, FILM COATED ORAL 2 TIMES DAILY
COMMUNITY
End: 2020-01-30 | Stop reason: SDUPTHER

## 2019-04-16 RX ORDER — BENZONATATE 200 MG/1
200 CAPSULE ORAL
Qty: 21 CAP | Refills: 0 | Status: SHIPPED | OUTPATIENT
Start: 2019-04-16 | End: 2019-04-23

## 2019-04-16 RX ORDER — LEVOFLOXACIN 500 MG/1
500 TABLET, FILM COATED ORAL DAILY
Qty: 7 TAB | Refills: 0 | Status: SHIPPED | OUTPATIENT
Start: 2019-04-16 | End: 2019-06-12

## 2019-04-16 NOTE — PROGRESS NOTES
Debbie Santa is a 61 y.o. female and presents with Hypertension and URI (x 2 wks)  . Subjective:  Upper respiratory infection Review:  Debbie Santa is a 61 y.o. female who complains of nasal congestion,sore throat, productive cough, myalgias  for the past few days, gradually worsening since that time. She denies a history of shortness of breath. Evaluation to date: none. Treatment to date: OTC products. Relevant PMH: No pertinent additional PMH. GERD Review:   Patient has a history of gastroesophageal reflux with heartburn. Symptoms have been present for a few months. She denies dysphagia. She  has not lost weight. She denies melena, hematochezia, hematemesis, and coffee ground emesis. This has been associated with fullness after meals. She denies abdominal bloating and none. Medical therapy in the past has included proton pump inhibitor      Hypertension Review:  The patient has essential hypertension  Diet and Lifestyle: generally follows a  low sodium diet, exercises sporadically  Home BP Monitoring: is not measured at home. Pertinent ROS: taking medications as instructed, no medication side effects noted, no TIA's, no chest pain on exertion, no dyspnea on exertion, no swelling of ankles.      Review of Systems  Constitutional: negative for fevers, chills, anorexia and weight loss  Eyes:   negative for visual disturbance and irritation  ENT:   sore throat,nasal congestion,  Respiratory:  cough,  CV:   negative for chest pain, palpitations, lower extremity edema  GI:   negative for nausea, vomiting, diarrhea, abdominal pain,melena  Endo:               negative for polyuria,polydipsia,polyphagia,heat intolerance  Genitourinary: negative for frequency, dysuria and hematuria  Integument:  negative for rash and pruritus  Hematologic:  negative for easy bruising and gum/nose bleeding  Musculoskel: negative for myalgias, arthralgias, back pain, muscle weakness, joint pain  Neurological: negative for headaches, dizziness, vertigo, memory problems and gait   Behavl/Psych: negative for feelings of anxiety, depression, mood changes    Past Medical History:   Diagnosis Date    Anemia, unspecified 2/21/2011    Arthritis, Degenerative,Knee 2/21/2011    Degenerative Disc Disease 2/21/2011    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 No pertinent surgical history. Social History     Socioeconomic History    Marital status: UNKNOWN     Spouse name: Not on file    Number of children: Not on file    Years of education: Not on file    Highest education level: Not on file   Tobacco Use    Smoking status: Former Smoker    Smokeless tobacco: Never Used   Substance and Sexual Activity    Alcohol use: Yes     Alcohol/week: 0.5 oz     Types: 1 Glasses of wine per week     Comment: SOCIALLY    Drug use: No    Sexual activity: Never     Family History   Problem Relation Age of Onset    Diabetes Mother     Hypertension Mother     Hypertension Father     Cancer Father      Current Outpatient Medications   Medication Sig Dispense Refill    famotidine (PEPCID) 20 mg tablet Take 20 mg by mouth daily.  levoFLOXacin (LEVAQUIN) 500 mg tablet Take 1 Tab by mouth daily. 7 Tab 0    fluticasone propionate (FLONASE) 50 mcg/actuation nasal spray 2 Sprays by Both Nostrils route daily. 1 Bottle 12    benzonatate (TESSALON) 200 mg capsule Take 1 Cap by mouth three (3) times daily as needed for Cough for up to 7 days. 21 Cap 0    cloNIDine HCl (CATAPRES) 0.1 mg tablet TAKE ONE TABLET BY MOUTH TWICE DAILY 60 Tab 3    bumetanide (BUMEX) 1 mg tablet TAKE ONE TABLET BY MOUTH ONCE DAILY 30 Tab 12    potassium chloride (KLOR-CON) 10 mEq tablet Take 2 Tabs by mouth daily.  60 Tab 5    amLODIPine (NORVASC) 10 mg tablet Take 1 Tab by mouth daily. 30 Tab 3    naproxen (NAPROSYN) 500 mg tablet TAKE ONE TABLET BY MOUTH TWICE DAILY WITH  A  MEAL 60 Tab 6    losartan (COZAAR) 100 mg tablet TAKE ONE TABLET BY MOUTH ONCE DAILY 90 Tab 3    cyclobenzaprine (FLEXERIL) 10 mg tablet TAKE ONE TABLET BY MOUTH TWICE DAILY 60 Tab 12    gabapentin (NEURONTIN) 300 mg capsule Take 1 Cap by mouth three (3) times daily. 90 Cap 12    ergocalciferol (VITAMIN D2) 50,000 unit capsule Take 50,000 Units by mouth.  hydroxychloroquine (PLAQUENIL) 200 mg tablet Take 400 mg by mouth daily. No Known Allergies    Objective:  Visit Vitals  /80   Pulse 93   Temp 98.6 °F (37 °C) (Oral)   Resp 16   Ht 5' 11\" (1.803 m)   Wt 202 lb (91.6 kg)   SpO2 97%   BMI 28.17 kg/m²     Physical Exam:   General appearance - alert, well appearing, and in no distress  Mental status - alert, oriented to person, place, and time  EYE-KASEY, EOMI, corneas normal, no foreign bodies  ENT-ENT exam normal, no neck nodes or sinus tenderness  Nose -Turbinates boggy and mucoid with erythema and edema noted  Mouth - mucous membranes moist, pharynx normal without lesions  Neck - supple, no significant adenopathy   Chest -scattered wheezes,rhonchi, symmetric air entry   Heart - normal rate, regular rhythm, normal S1, S2, no murmurs, rubs, clicks or gallops   Abdomen - soft, nontender, nondistended, no masses or organomegaly  Lymph- no adenopathy palpable  Ext-peripheral pulses normal, no pedal edema, no clubbing or cyanosis  Skin-Warm and dry.  no hyperpigmentation, vitiligo, or suspicious lesions  Neuro -alert, oriented, normal speech, no focal findings or movement disorder noted  Neck-normal C-spine, no tenderness, full ROM without pain  Feet-no nail deformities or callus formation with good pulses noted      Results for orders placed or performed in visit on 04/16/19   AMB POC LIPID PROFILE   Result Value Ref Range    Cholesterol (POC) 131     Triglycerides (POC) 70 HDL Cholesterol (POC) 32     Non-HDL Cholesterol 99     LDL Cholesterol (POC) 85 MG/DL    TChol/HDL Ratio (POC) 4.1        Assessment/Plan:    ICD-10-CM ICD-9-CM    1. Lumbar radiculopathy, acute M54.16 724.4    2. Essential hypertension, benign I10 401.1 AMB POC LIPID PROFILE   3. Acute bronchitis, unspecified organism J20.9 466.0      Orders Placed This Encounter    AMB POC LIPID PROFILE    famotidine (PEPCID) 20 mg tablet     Sig: Take 20 mg by mouth daily.  levoFLOXacin (LEVAQUIN) 500 mg tablet     Sig: Take 1 Tab by mouth daily. Dispense:  7 Tab     Refill:  0    fluticasone propionate (FLONASE) 50 mcg/actuation nasal spray     Si Sprays by Both Nostrils route daily. Dispense:  1 Bottle     Refill:  12    benzonatate (TESSALON) 200 mg capsule     Sig: Take 1 Cap by mouth three (3) times daily as needed for Cough for up to 7 days. Dispense:  21 Cap     Refill:  0     lose weight, follow low fat diet, follow low salt diet, continue present plan,Take 81mg aspirin daily  Patient Instructions   RedT Activation    Thank you for requesting access to RedT. Please follow the instructions below to securely access and download your online medical record. RedT allows you to send messages to your doctor, view your test results, renew your prescriptions, schedule appointments, and more. How Do I Sign Up? 1. In your internet browser, go to [email or web address]  2. Click on the First Time User? Click Here link in the Sign In box. You will be redirect to the New Member Sign Up page. 3. Enter your RedT Access Code exactly as it appears below. You will not need to use this code after youve completed the sign-up process. If you do not sign up before the expiration date, you must request a new code. RedT Access Code: [email or web address]  Expires: 2019  2:13 PM (This is the date your RedT access code will )    4.  Enter the last four digits of your Social Security Number (xxxx) and Date of Birth (mm/dd/yyyy) as indicated and click Submit. You will be taken to the next sign-up page. 5. Create a Beststudyt ID. This will be your iList login ID and cannot be changed, so think of one that is secure and easy to remember. 6. Create a iList password. You can change your password at any time. 7. Enter your Password Reset Question and Answer. This can be used at a later time if you forget your password. 8. Enter your e-mail address. You will receive e-mail notification when new information is available in [address] 9. Click Sign Up. You can now view and download portions of your medical record. 10. Click the Download Summary menu link to download a portable copy of your medical information. Additional Information    If you have questions, please visit the Frequently Asked Questions section of the iList website at [email or web address] Remember, iList is NOT to be used for urgent needs. For medical emergencies, dial 911. Follow-up and Dispositions    · Return in about 3 months (around 7/16/2019), or if symptoms worsen or fail to improve. I have reviewed with the patient details of the assessment and plan and all questions were answered. Relevent patient education was performed. The most recent lab findings were reviewed with the patient. An After Visit Summary was printed and given to the patient.

## 2019-04-16 NOTE — PATIENT INSTRUCTIONS
Videostrip Activation    Thank you for requesting access to Videostrip. Please follow the instructions below to securely access and download your online medical record. Videostrip allows you to send messages to your doctor, view your test results, renew your prescriptions, schedule appointments, and more. How Do I Sign Up? 1. In your internet browser, go to [email or web address]  2. Click on the First Time User? Click Here link in the Sign In box. You will be redirect to the New Member Sign Up page. 3. Enter your Videostrip Access Code exactly as it appears below. You will not need to use this code after youve completed the sign-up process. If you do not sign up before the expiration date, you must request a new code. Videostrip Access Code: [email or web address]  Expires: 2019  2:13 PM (This is the date your Videostrip access code will )    4. Enter the last four digits of your Social Security Number (xxxx) and Date of Birth (mm/dd/yyyy) as indicated and click Submit. You will be taken to the next sign-up page. 5. Create a Videostrip ID. This will be your Videostrip login ID and cannot be changed, so think of one that is secure and easy to remember. 6. Create a Videostrip password. You can change your password at any time. 7. Enter your Password Reset Question and Answer. This can be used at a later time if you forget your password. 8. Enter your e-mail address. You will receive e-mail notification when new information is available in [address] 9. Click Sign Up. You can now view and download portions of your medical record. 10. Click the Download Summary menu link to download a portable copy of your medical information. Additional Information    If you have questions, please visit the Frequently Asked Questions section of the Videostrip website at [email or web address] Remember, Videostrip is NOT to be used for urgent needs. For medical emergencies, dial 911.

## 2019-04-16 NOTE — PROGRESS NOTES
1. Have you been to the ER, urgent care clinic since your last visit? Hospitalized since your last visit?no    2. Have you seen or consulted any other health care providers outside of the [address] [place] [address] since your last visit? Include any pap smears or colon screening. No    Chief Complaint   Patient presents with    Hypertension     Per Dr. Severiano Coleman,  verbal order given for needed amb poc labs.       3 most recent PHQ Screens 1/15/2019   PHQ Not Done -   Little interest or pleasure in doing things Not at all   Feeling down, depressed, irritable, or hopeless Not at all   Total Score PHQ 2 0

## 2019-05-02 RX ORDER — LOSARTAN POTASSIUM 100 MG/1
TABLET ORAL
Qty: 30 TAB | Refills: 11 | Status: SHIPPED | OUTPATIENT
Start: 2019-05-02 | End: 2019-06-12

## 2019-05-15 RX ORDER — CLONIDINE HYDROCHLORIDE 0.1 MG/1
TABLET ORAL
Qty: 60 TAB | Refills: 3 | Status: SHIPPED | OUTPATIENT
Start: 2019-05-15 | End: 2019-08-15

## 2019-06-12 ENCOUNTER — APPOINTMENT (OUTPATIENT)
Dept: GENERAL RADIOLOGY | Age: 59
End: 2019-06-12
Attending: PHYSICIAN ASSISTANT
Payer: MEDICAID

## 2019-06-12 ENCOUNTER — HOSPITAL ENCOUNTER (EMERGENCY)
Age: 59
Discharge: HOME | End: 2019-06-12
Attending: EMERGENCY MEDICINE | Admitting: EMERGENCY MEDICINE
Payer: MEDICAID

## 2019-06-12 VITALS
OXYGEN SATURATION: 100 % | SYSTOLIC BLOOD PRESSURE: 189 MMHG | RESPIRATION RATE: 14 BRPM | HEART RATE: 96 BPM | BODY MASS INDEX: 27.46 KG/M2 | DIASTOLIC BLOOD PRESSURE: 88 MMHG | WEIGHT: 191.8 LBS | TEMPERATURE: 97.9 F | HEIGHT: 70 IN

## 2019-06-12 DIAGNOSIS — S39.012A LUMBAR STRAIN, INITIAL ENCOUNTER: Primary | ICD-10-CM

## 2019-06-12 PROCEDURE — 72100 X-RAY EXAM L-S SPINE 2/3 VWS: CPT

## 2019-06-12 PROCEDURE — 99282 EMERGENCY DEPT VISIT SF MDM: CPT

## 2019-06-12 RX ORDER — GLUCOSAMINE SULFATE 1500 MG
2000 POWDER IN PACKET (EA) ORAL DAILY
COMMUNITY

## 2019-06-12 RX ORDER — CYCLOBENZAPRINE HCL 10 MG
10 TABLET ORAL
Qty: 20 TAB | Refills: 0 | Status: SHIPPED | OUTPATIENT
Start: 2019-06-12 | End: 2021-06-22 | Stop reason: ALTCHOICE

## 2019-06-12 RX ORDER — LOSARTAN POTASSIUM 100 MG/1
100 TABLET ORAL DAILY
COMMUNITY
End: 2020-01-30 | Stop reason: SDUPTHER

## 2019-06-12 RX ORDER — LATANOPROST 50 UG/ML
1 SOLUTION/ DROPS OPHTHALMIC
COMMUNITY
End: 2022-04-20 | Stop reason: SDUPTHER

## 2019-06-12 NOTE — ED PROVIDER NOTES
EMERGENCY DEPARTMENT HISTORY AND PHYSICAL EXAM      Date: 6/12/2019  Patient Name: Brittney Mayer    History of Presenting Illness     Chief Complaint   Patient presents with    Back Pain     Patient states she slipped in water yesterday while in grocery store. Patient states when she fell, she twisted her right ankle and lower back. Reports back surgery last year. Ambulatory into triage with cane    Ankle Pain       History Provided By: Patient    HPI: Brittney Mayer, 61 y.o. female with PMHx significant for back pain and spinal surgery, presents by POV to the ED with cc of back pain. Patient states yesterday she was in a store and acci then by dentally slipped on a wet floor. She did not fall to the ground but noted that she was able to catch herself before falling. Yesterday she noted mild discomfort to the right foot, which is completely resolved after rubbing BenGay onto her foot. She also note seek follow-up with an x-ray s a slight increase of her chronic back pain. The pain is located in the right lower back. It is nonradiating. There are no exacerbating symptoms. She notes to have taken Motrin yesterday with some relief. There are no other complaints, changes, or physical findings at this time. PCP: Barber Pop., MD   Neurosurgeon: Dr. Julio Bajwa    No current facility-administered medications on file prior to encounter. Current Outpatient Medications on File Prior to Encounter   Medication Sig Dispense Refill    latanoprost (XALATAN) 0.005 % ophthalmic solution Administer 1 Drop to both eyes nightly.  cholecalciferol (VITAMIN D3) 1,000 unit cap Take 2,000 Units by mouth daily.  losartan (COZAAR) 100 mg tablet Take 100 mg by mouth daily.  cloNIDine HCl (CATAPRES) 0.1 mg tablet TAKE ONE TABLET BY MOUTH TWICE DAILY 60 Tab 3    famotidine (PEPCID) 20 mg tablet Take 20 mg by mouth two (2) times a day.       fluticasone propionate (FLONASE) 50 mcg/actuation nasal spray 2 Sprays by Both Nostrils route daily. 1 Bottle 12    bumetanide (BUMEX) 1 mg tablet TAKE ONE TABLET BY MOUTH ONCE DAILY 30 Tab 12    potassium chloride (KLOR-CON) 10 mEq tablet Take 2 Tabs by mouth daily. 60 Tab 5    amLODIPine (NORVASC) 10 mg tablet Take 1 Tab by mouth daily. 30 Tab 3    naproxen (NAPROSYN) 500 mg tablet TAKE ONE TABLET BY MOUTH TWICE DAILY WITH  A  MEAL 60 Tab 6    cyclobenzaprine (FLEXERIL) 10 mg tablet TAKE ONE TABLET BY MOUTH TWICE DAILY (Patient taking differently: TAKE ONE TABLET BY MOUTH TWICE DAILY as needed) 60 Tab 12    hydroxychloroquine (PLAQUENIL) 200 mg tablet Take 400 mg by mouth daily. 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 Family History:  Family History   Problem Relation Age of Onset    Diabetes Mother     Hypertension Mother     Hypertension Father     Cancer Father        Social History:  Social History     Tobacco Use    Smoking status: Former Smoker    Smokeless tobacco: Never Used   Substance Use Topics    Alcohol use: Yes     Alcohol/week: 0.5 oz     Types: 1 Glasses of wine per week     Comment: SOCIALLY    Drug use: No       Allergies:  No Known Allergies      Review of Systems   Review of Systems   Constitutional: Negative for chills, diaphoresis and fever. HENT: Negative for congestion, ear pain, rhinorrhea and sore throat. Respiratory: Negative for cough and shortness of breath. Cardiovascular: Negative for chest pain.    Gastrointestinal: Negative for abdominal pain, constipation, diarrhea, nausea and vomiting. Denies incontinence. Genitourinary: Negative for difficulty urinating, dysuria, frequency and hematuria. Denies incontinence or urinary retention. Musculoskeletal: Positive for arthralgias and back pain. Negative for gait problem and myalgias. Neurological: Negative for headaches. Denies saddle paresthesias. All other systems reviewed and are negative. Physical Exam   Physical Exam   Constitutional: She is oriented to person, place, and time. She appears well-developed and well-nourished. No distress. 61 y.o. -American female    HENT:   Head: Normocephalic and atraumatic. Eyes: Conjunctivae are normal. Right eye exhibits no discharge. Left eye exhibits no discharge. Neck: Normal range of motion. Neck supple. Cardiovascular: Normal rate, regular rhythm and normal heart sounds. No murmur heard. Pulmonary/Chest: Effort normal and breath sounds normal. No respiratory distress. Musculoskeletal:   BACK: Normal spinal curvatures. No step off or deformity. Tenderness to palpation of the right lower back. Negative seated SLR bilaterally. Strength of the LE 5/5 and equal bilaterally. Ambulatory without difficulty. Lymphadenopathy:     She has no cervical adenopathy. Neurological: She is alert and oriented to person, place, and time. Skin: Skin is warm and dry. She is not diaphoretic. Psychiatric: She has a normal mood and affect. Her behavior is normal.   Nursing note and vitals reviewed. Diagnostic Study Results     Labs - None    Radiologic Studies -   XR SPINE LUMB 2 OR 3 V   Final Result   IMPRESSION:   No acute fracture or dislocation. Medical Decision Making   I am the first provider for this patient. I reviewed the vital signs, available nursing notes, past medical history, past surgical history, family history and social history. Vital Signs-Reviewed the patient's vital signs.   Patient Vitals for the past 12 hrs:   Temp Pulse Resp BP SpO2   06/12/19 0846 97.9 °F (36.6 °C) 96 14 189/88 100 %     Records Reviewed: Nursing Notes and Old Medical Records    Provider Notes (Medical Decision Making):   Fracture, sprain, strain, contusion, spasm,     ED Course:   Initial assessment performed. The patients presenting problems have been discussed, and they are in agreement with the care plan formulated and outlined with them. I have encouraged them to ask questions as they arise throughout their visit. Critical Care Time: None    Disposition:  DISCHARGE NOTE:  10:10 AM  The pt is ready for discharge. The pt's signs, symptoms, diagnosis, and discharge instructions have been discussed and pt has conveyed their understanding. The pt is to follow up as recommended or return to ER should their symptoms worsen. Plan has been discussed and pt is in agreement. PLAN:  1. Current Discharge Medication List        2. Follow-up Information     Follow up With Specialties Details Why Oneida Yu MD Internal Medicine In 1 week As needed [address] [id number] [facility]      Chris Jones MD Neurosurgery In 1 week As needed [address]  [phone]        3. Rest, heat, massage and gentle stretches. Return to ED if worse     Diagnosis     Clinical Impression:   1. Lumbar strain, initial encounter          Please note that this dictation was completed with [employer], the computer voice recognition software. Quite often unanticipated grammatical, syntax, homophones, and other interpretive errors are inadvertently transcribed by the computer software. Please disregards these errors. Please excuse any errors that have escaped final proofreading. This note will not be viewable in [address] 1:08 AM  I was personally available for consultation in the emergency department.   I have reviewed the chart and agree with the documentation recorded by the [facility], including the assessment, treatment plan, and disposition.   Yobany Cordero MD

## 2019-06-12 NOTE — DISCHARGE INSTRUCTIONS
Patient Education        Getting Back to Normal After Low Back Pain: Care Instructions  Your Care Instructions  Almost everyone has low back pain at some time. The good news is that most low back pain will go away in a few days or weeks with some basic self-care. Some people are afraid that doing too much may make their pain worse. In the past, people stayed in bed, thinking this would help their backs. Now doctors think that, in most cases, getting back to your normal activities is good for your back, as long as you avoid doing things that make your pain worse. Follow-up care is a key part of your treatment and safety. Be sure to make and go to all appointments, and call your doctor if you are having problems. It's also a good idea to know your test results and keep a list of the medicines you take. How can you care for yourself at home? Ease back into daily activities  · For the first day or two of pain, take it easy. But as soon as possible, get back to your normal daily life and activities. · Get gentle exercise, such as walking. Movement keeps your spine flexible and helps your muscles stay strong. · If you are an athlete, return to your activity carefully. Choose a low-impact option until your pain is under control. Avoid or change activities that cause pain  · Try to avoid too much bending, heavy lifting, or reaching. These movements put extra stress on your back. · In bed, try lying on your side with a pillow between your knees. Or lie on your back on the floor with a pillow under your knees. · When you sit, place a small pillow, a rolled-up towel, or a lumbar roll in the curve of your back for extra support. · Try putting one foot up on a stool or changing positions every few minutes if you have to stand still for a period of time. Pay attention to body mechanics and posture  Body mechanics are the way you use your body. Posture is the way you sit or stand. · Take extra care when you lift.  When you must lift, bend your knees and keep your back straight. Avoid twisting, and keep the load close to your body. · Stand or sit tall, with your shoulders back and your stomach pulled in to support your back. Get support when you need it  · Let people know when you need a helping hand. Get family members or friends to help out with tasks you cannot do right now. · Be honest with your doctor about how the pain affects you. · If you have had to take time off work, talk to your doctor and boss about a gradual [id number] plan. Find out if there are other ways you could do your job to avoid hurting your back again. Reduce stress  Worrying about the pain can cause you to tense the muscles in your lower back. This in turn causes more pain. Here are a few things you can do to relax your mind and your muscles:  · Take 10 to 15 minutes to sit quietly and breathe deeply. Try to focus only on your breathing. If you cannot keep thoughts away, think about things that make you feel good. · Get involved in your favorite hobby, or try something new. · Talk to a friend, read a book, or listen to your favorite music. · Find a counselor you like and trust. Talk openly and honestly about your problems. Be willing to make some changes. When should you call for help? Call 911 anytime you think you may need emergency care. For example, call if:    · You are unable to move a leg at all.   [facility] your doctor now or seek immediate medical care if:    · You have new or worse symptoms in your legs, belly, or buttocks. Symptoms may include:  ? Numbness or tingling. ? Weakness. ? Pain.     · You lose bladder or bowel control.    Watch closely for changes in your health, and be sure to contact your doctor if:    · You have a fever, lose weight, or don't feel well.     · You are not getting better as expected. Where can you learn more? Go to http://lissett-shellie.info/.   Enter J485 in the search box to learn more about \"Getting Back to Normal After Low Back Pain: Care Instructions. \"  Current as of: September 20, 2018  Content Version: 11.9  © 2997-5456 [employer]. Care instructions adapted under license by [employer] (which disclaims liability or warranty for this information). If you have questions about a medical condition or this instruction, always ask your healthcare professional. [address] any warranty or liability for your use of this information.

## 2019-06-18 ENCOUNTER — OFFICE VISIT (OUTPATIENT)
Dept: INTERNAL MEDICINE CLINIC | Age: 59
End: 2019-06-18

## 2019-06-18 VITALS
RESPIRATION RATE: 16 BRPM | SYSTOLIC BLOOD PRESSURE: 104 MMHG | TEMPERATURE: 98 F | BODY MASS INDEX: 26.92 KG/M2 | HEIGHT: 70 IN | DIASTOLIC BLOOD PRESSURE: 74 MMHG | WEIGHT: 188 LBS

## 2019-06-18 DIAGNOSIS — S93.411A SPRAIN OF CALCANEOFIBULAR LIGAMENT OF RIGHT ANKLE, INITIAL ENCOUNTER: ICD-10-CM

## 2019-06-18 DIAGNOSIS — S33.5XXA LUMBAR SPRAIN, INITIAL ENCOUNTER: Primary | ICD-10-CM

## 2019-06-18 RX ORDER — BACLOFEN 10 MG/1
10 TABLET ORAL 3 TIMES DAILY
Qty: 90 TAB | Refills: 1 | Status: SHIPPED | OUTPATIENT
Start: 2019-06-18 | End: 2022-06-06

## 2019-06-18 NOTE — PROGRESS NOTES
Bonita Antonio is a 61 y.o. female and presents with Fall  . Subjective:  Back Pain Review:  Patient presents for evaluation of low back problems. Symptoms have been present for one week and include pain in lower back (dull, mild in character;7 /10 in severity). Initial inciting a fall at [facility]. Symptoms are worst: at times. Alleviating factors identifiable by patient are lying flat, medication . Exacerbating factors identifiable by patient are bending forwards, bending backwards. Treatments so far initiated by patient: medication Previous lower back problems: reported. Previous workup: back surgery in the past.    Ankle Pain Review:  Patient complains of right ankle pain. Onset of the symptoms was a week ago. Inciting event: a fall on water. Current symptoms include ability to bear weight, but with some pain. Aggravating symptoms: standing. Patient's overall course: gradually improving. Patient has had no prior ankle problems. Review of Systems  Constitutional: negative for fevers, chills, anorexia and weight loss  Eyes:   negative for visual disturbance and irritation  ENT:   negative for tinnitus,sore throat,nasal congestion,ear pains. hoarseness  Respiratory:  negative for cough, hemoptysis, dyspnea,wheezing  CV:   negative for chest pain, palpitations, lower extremity edema  GI:   negative for nausea, vomiting, diarrhea, abdominal pain,melena  Endo:               negative for polyuria,polydipsia,polyphagia,heat intolerance  Genitourinary: negative for frequency, dysuria and hematuria  Integument:  negative for rash and pruritus  Hematologic:  negative for easy bruising and gum/nose bleeding  Musculoskel: myalgias, arthralgias, back pain, muscle weakness, joint pain  Neurological:  negative for headaches, dizziness, vertigo, memory problems and gait   Behavl/Psych: negative for feelings of anxiety, depression, mood changes    Past Medical History:   Diagnosis Date    Anemia, unspecified 2/21/2011    Arthritis, Degenerative,Knee 2/21/2011    Degenerative Disc Disease 2/21/2011    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 No pertinent surgical history. Social History     Socioeconomic History    Marital status: UNKNOWN     Spouse name: Not on file    Number of children: Not on file    Years of education: Not on file    Highest education level: Not on file   Tobacco Use    Smoking status: Former Smoker    Smokeless tobacco: Never Used   Substance and Sexual Activity    Alcohol use: Yes     Alcohol/week: 0.5 oz     Types: 1 Glasses of wine per week     Comment: SOCIALLY    Drug use: No    Sexual activity: Never     Family History   Problem Relation Age of Onset    Diabetes Mother     Hypertension Mother     Hypertension Father     Cancer Father      Current Outpatient Medications   Medication Sig Dispense Refill    baclofen (LIORESAL) 10 mg tablet Take 1 Tab by mouth three (3) times daily. 90 Tab 1    latanoprost (XALATAN) 0.005 % ophthalmic solution Administer 1 Drop to both eyes nightly.  cholecalciferol (VITAMIN D3) 1,000 unit cap Take 2,000 Units by mouth daily.  losartan (COZAAR) 100 mg tablet Take 100 mg by mouth daily.  cyclobenzaprine (FLEXERIL) 10 mg tablet Take 1 Tab by mouth three (3) times daily as needed for Muscle Spasm(s). 20 Tab 0    cloNIDine HCl (CATAPRES) 0.1 mg tablet TAKE ONE TABLET BY MOUTH TWICE DAILY 60 Tab 3    famotidine (PEPCID) 20 mg tablet Take 20 mg by mouth two (2) times a day.  fluticasone propionate (FLONASE) 50 mcg/actuation nasal spray 2 Sprays by Both Nostrils route daily.  1 Bottle 12    bumetanide (BUMEX) 1 mg tablet TAKE ONE TABLET BY MOUTH ONCE DAILY 30 Tab 12    potassium chloride (KLOR-CON) 10 mEq tablet Take 2 Tabs by mouth daily. 60 Tab 5    amLODIPine (NORVASC) 10 mg tablet Take 1 Tab by mouth daily. 30 Tab 3    naproxen (NAPROSYN) 500 mg tablet TAKE ONE TABLET BY MOUTH TWICE DAILY WITH  A  MEAL 60 Tab 6    cyclobenzaprine (FLEXERIL) 10 mg tablet TAKE ONE TABLET BY MOUTH TWICE DAILY (Patient taking differently: TAKE ONE TABLET BY MOUTH TWICE DAILY as needed) 60 Tab 12    hydroxychloroquine (PLAQUENIL) 200 mg tablet Take 400 mg by mouth daily. No Known Allergies    Objective:  Visit Vitals  /74   Temp 98 °F (36.7 °C) (Oral)   Resp 16   Ht 5' 10\" (1.778 m)   Wt 188 lb (85.3 kg)   BMI 26.98 kg/m²     Physical Exam:   General appearance - alert, well appearing, and in mild distress  Mental status - alert, oriented to person, place, and time  EYE-KASEY, EOMI, corneas normal, no foreign bodies  ENT-ENT exam normal, no neck nodes or sinus tenderness  Nose - normal and patent, no erythema, discharge or polyps  Mouth - mucous membranes moist, pharynx normal without lesions  Neck - supple, no significant adenopathy   Chest - clear to auscultation, no wheezes, rales or rhonchi, symmetric air entry   Heart - normal rate, regular rhythm, normal S1, S2, no murmurs, rubs, clicks or gallops   Abdomen - soft, nontender, nondistended, no masses or organomegaly  Lymph- no adenopathy palpable  Ext-peripheral pulses normal, no pedal edema, no clubbing or cyanosis  Skin-Warm and dry. no hyperpigmentation, vitiligo, or suspicious lesions  Neuro -alert, oriented, normal speech, no focal findings or movement disorder noted  Neck-normal C-spine, no tenderness, full ROM without pain  Feet-no nail deformities or callus formation with good pulses noted  Rt. ankle-tenderness over lateral maleolus  Back-tenderness lower lumbar spine and sacral spine noted,forward flexion,hyperextension impaired,negative straight leg raise      Results for orders placed or performed in visit on 04/16/19   AMB POC LIPID PROFILE   Result Value Ref Range Cholesterol (POC) 131     Triglycerides (POC) 70     HDL Cholesterol (POC) 32     Non-HDL Cholesterol 99     LDL Cholesterol (POC) 85 MG/DL    TChol/HDL Ratio (POC) 4.1        Assessment/Plan:    ICD-10-CM ICD-9-CM    1. Lumbar sprain, initial encounter S33. 5XXA 847.2 REFERRAL TO PHYSICAL THERAPY   2. Sprain of calcaneofibular ligament of right ankle, initial encounter S93.411A 845.02 REFERRAL TO PHYSICAL THERAPY     Orders Placed This Encounter    REFERRAL TO PHYSICAL THERAPY     Referral Priority:   Routine     Referral Type:   PT/OT/ST     Referral Reason:   Specialty Services Required     Referred to Provider:   Sebas Valdivia, PT, DPT     Number of Visits Requested:   1    baclofen (LIORESAL) 10 mg tablet     Sig: Take 1 Tab by mouth three (3) times daily. Dispense:  90 Tab     Refill:  1     Needs physical therapy  ,Take 81mg aspirin daily  Patient Instructions   MyChart Activation    Thank you for requesting access to [employer]. Please follow the instructions below to securely access and download your online medical record. [employer] allows you to send messages to your doctor, view your test results, renew your prescriptions, schedule appointments, and more. How Do I Sign Up? 1. In your internet browser, go to [email or web address]  2. Click on the First Time User? Click Here link in the Sign In box. You will be redirect to the New Member Sign Up page. 3. Enter your [employer] Access Code exactly as it appears below. You will not need to use this code after youve completed the sign-up process. If you do not sign up before the expiration date, you must request a new code. [employer] Access Code: [email or web address]  Expires: 2019  8:46 AM (This is the date your [employer] access code will )    4. Enter the last four digits of your Social Security Number (xxxx) and Date of Birth (mm/dd/yyyy) as indicated and click Submit. You will be taken to the next sign-up page. 5. Create a [employer] ID. This will be your [employer] login ID and cannot be changed, so think of one that is secure and easy to remember. 6. Create a [employer] password. You can change your password at any time. 7. Enter your Password Reset Question and Answer. This can be used at a later time if you forget your password. 8. Enter your e-mail address. You will receive e-mail notification when new information is available in [address] 9. Click Sign Up. You can now view and download portions of your medical record. 10. Click the Download Summary menu link to download a portable copy of your medical information. Additional Information    If you have questions, please visit the Frequently Asked Questions section of the [employer] website at [email or web address] Remember, [employer] is NOT to be used for urgent needs. For medical emergencies, dial 911. Follow-up and Dispositions    · Return if symptoms worsen or fail to improve. I have reviewed with the patient details of the assessment and plan and all questions were answered. Relevent patient education was performed. The most recent lab findings were reviewed with the patient. An After Visit Summary was printed and given to the patient.

## 2019-06-18 NOTE — PROGRESS NOTES
1. Have you been to the ER, urgent care clinic since your last visit? Hospitalized since your last visit?no    2. Have you seen or consulted any other health care providers outside of the [address] [place] [address] since your last visit? Include any pap smears or colon screening.  no    3 most recent PHQ Screens 1/15/2019   PHQ Not Done -   Little interest or pleasure in doing things Not at all   Feeling down, depressed, irritable, or hopeless Not at all   Total Score PHQ 2 0       Chief Complaint   Patient presents with   Ashwini Prado

## 2019-06-18 NOTE — PATIENT INSTRUCTIONS
[employer] Activation    Thank you for requesting access to [employer]. Please follow the instructions below to securely access and download your online medical record. [employer] allows you to send messages to your doctor, view your test results, renew your prescriptions, schedule appointments, and more. How Do I Sign Up? 1. In your internet browser, go to [email or web address]  2. Click on the First Time User? Click Here link in the Sign In box. You will be redirect to the New Member Sign Up page. 3. Enter your [employer] Access Code exactly as it appears below. You will not need to use this code after youve completed the sign-up process. If you do not sign up before the expiration date, you must request a new code. [employer] Access Code: [email or web address]  Expires: 2019  8:46 AM (This is the date your [employer] access code will )    4. Enter the last four digits of your Social Security Number (xxxx) and Date of Birth (mm/dd/yyyy) as indicated and click Submit. You will be taken to the next sign-up page. 5. Create a [employer] ID. This will be your [employer] login ID and cannot be changed, so think of one that is secure and easy to remember. 6. Create a [employer] password. You can change your password at any time. 7. Enter your Password Reset Question and Answer. This can be used at a later time if you forget your password. 8. Enter your e-mail address. You will receive e-mail notification when new information is available in [address] 9. Click Sign Up. You can now view and download portions of your medical record. 10. Click the Download Summary menu link to download a portable copy of your medical information. Additional Information    If you have questions, please visit the Frequently Asked Questions section of the [employer] website at [email or web address] Remember, [employer] is NOT to be used for urgent needs. For medical emergencies, dial 911.

## 2019-06-26 ENCOUNTER — HOSPITAL ENCOUNTER (OUTPATIENT)
Dept: PHYSICAL THERAPY | Age: 59
Discharge: HOME | End: 2019-06-26
Payer: MEDICAID

## 2019-06-26 PROCEDURE — 97161 PT EVAL LOW COMPLEX 20 MIN: CPT

## 2019-06-26 PROCEDURE — 97110 THERAPEUTIC EXERCISES: CPT

## 2019-06-26 NOTE — PROGRESS NOTES
PT INITIAL EVALUATION NOTE - [facility] 2-15    Patient Name: Griffin Rodriguez  Date:2019  : 1960  [x]  Patient  Verified  Payor: Sammi Jasso / Plan: [facility] COMMUN[employer] PLAN [employer] / Product Type: Managed Care Medicaid /    In time:1:51 pm  Out time:2:40 pm  Total Treatment Time (min): 49 minutes  Total Timed Codes (min): 10 minutes  1:1 Treatment Time ( only): --   Visit #: 1     Treatment Area: Low back pain [M54.5]  Right ankle pain [M25.571]    SUBJECTIVE  Pain Level (0-10 scale): 9/10  Any medication changes, allergies to medications, adverse drug reactions, diagnosis change, or new procedure performed?: [] No    [x] Yes (see summary sheet for update)  Subjective:    Pt was referred to skilled PT for low back pain and sprain of right calcaneofibular ligament. Today, Pt reports primary complaints of constant pain in right/central lower back. She notes mild right lateral ankle pain, primarily with walking. Pt has history of left>right leg weakness and poor balance. Pt denies numbness/tingling. Pt ambulates with SPC with ambulation outside of the house due to balance. Pt denies any falls. Mechanism of Injury: 2 weeks ago, Pt was walking in the grocery store and slipped on some water on the floor and twisted her back and ankle to catch herself from falling. Pt was having pretty good relief with back pain since her surgery. Aggravating factors include bending over>walking, stairs, prolonged standing>prolonged sitting, standing up from sitting. Relieving factors include lying on hard surface, pain pills. Patient reports functional limitations with: sleeping, picking items off floor    PLOF: light yard work occasionally, walking around house.   Previous Treatment/Compliance: PT for lower legs before discovery of cellulitis   Work Hx: Disability    Living Situation[facility], stairs do have railings  PMHx/Surgical Hx: Back surgery February last year, per Pt; Lupus, high blood pressure, anemia, arthritis, DDD, OA, GERD, Xerosis      X-rays: (06/2019): Facet arthropathy at L5-S1 with minimal retrolisthesis of L5 on S1. MRI: (11/2017): IMPRESSION:  Multilevel degenerative disc disease and degenerative changes redemonstrated. Findings are likely unchanged, but are better appreciated on today's exam secondary to lack of motion, which was present on the prior study. Disc bulges and protrusions redemonstrated at multiple levels. Unchanged large left protrusion at L5-S1. Of note, this protrusion appeared to be at L4-L5 on the prior exam secondary to the degree of motion and resulting misregistration. Multilevel neuroforaminal narrowing ranging from mild to moderate, worst on the right at L4-L5. Severe spinal canal stenosis at L4-L5. Moderate stenosis at L3-L4 and L5-S1. Pt Goals: \"Not hurt as much and do more things. \"     OBJECTIVE    Posture:  Slumped posture, rounded shoulders  Other Observations:  --  Gait and Functional Mobility:  Decreased step height bilaterally, right trunk lean, B trendelenburg, decreased push off, SPC in RUE  Palpation: TTP R>L QL, multifidus, thoracic/lumbar PS; TTP L4 - S2 spinous processes        Lumbar AROM:          R  L    Flexion    25-50% low back pain      Extension   75% no change      Side Bending   75% low back pain  100% no change    Rotation   50% LBP   75% no change        LOWER QUARTER   MUSCLE STRENGTH  KEY       R  L  0 - No Contraction  L1, L2 Psoas  3+ p!  3+  1 - Trace   L3 Quads  4  5  2 - Poor   L4 Tib Ant  3+  3+  3 - Fair    L5 EHL  NT  NT  4 - Good   S1 Peroneals  NT  NT  5 - Normal   S2 Hams  4-  3+    Flexibility: Decreased hamstring, hip flexor, and gluteal flexibility bilaterally    Mobility Assessment: Severely decreased talocrural mobility bilaterally      MMT:               HIP Ext: NT (difficulty sit-to-stands, required B UE support)              HIP Abd: R: 3-; L: 3  Neurological: Reflexes / Sensations: Decreased L4, L5, and S1 sensation L>R  Special Tests:    Trendelenberg: (+)    FABERS: NT   Forward Bend: (+)    Slump: (+)   H.S. SLR: (+) R: 60; L: 70   Piriformis Ext: NT   Inversion stress test: (+) R   Talar tilt: (+) R    10 min Therapeutic Exercise:  [] See flow sheet :   Rationale: increase ROM, increase strength and increase proprioception to improve the patients ability to improve function with decreased pain or discomfort. With   [x] TE   [] TA   [] neuro   [] other: Patient Education: [x] Review HEP    [] Progressed/Changed HEP based on:   [] positioning   [] body mechanics   [] transfers   [] heat/ice application    [x] other: Educated Pt regarding impairments, role of PT, and POC.        Other Objective/Functional Measures:   FOTO Score: 36/100    Pain Level (0-10 scale) post treatment: 8/10    ASSESSMENT/Changes in Function:     [x]  See Plan of [address] 6/26/2019

## 2019-06-27 NOTE — PROGRESS NOTES
[facility] Therapy  [address] [address], [address] [place] [address]  Phone: [phone]  Fax: [phone]    Plan of Care/Statement of Necessity for Physical Therapy Services  2-15    Patient name: Angelica Degroot  : 1960  Provider#: [id number]  Referral source: Magi Varma MD      Medical/Treatment Diagnosis: Low back pain [M54.5]  Right ankle pain [M25.571]     Prior Hospitalization: see medical history     Comorbidities: Arthritis, Back surgery, Lupus, high blood pressure, anemia, arthritis, DDD, OA, GERD, Xerosis    Prior Level of Function: Patient completed 20 minutes of exercise once or twice a week. Medications: Verified on Patient Summary List    Start of Care: 19      Onset Date: 2 weeks prior       The Plan of Care and following information is based on the information from the initial evaluation. Assessment/ key information: Pt is a pleasant 61year old female who was referred to skilled PT for low back pain and right ankle sprain. Based on examination, patient presents with symptoms consistent with a lumbar strain and lumbar derangement along with a sprain of her right anterior talofibular and calcaneofibular ligaments. Pt demonstrates associated impairments including poor balance, impaired gait mechanics, significant hip and lower extremity weakness bilaterally, decreased lumbar AROM, and restricted talocrural mobility.     Evaluation Complexity History MEDIUM  Complexity : 1-2 comorbidities / personal factors will impact the outcome/ POC ; Examination MEDIUM Complexity : 3 Standardized tests and measures addressing body structure, function, activity limitation and / or participation in recreation  ;Presentation LOW Complexity : Stable, uncomplicated  ;Clinical Decision Making MEDIUM Complexity : FOTO score of 26-74  Overall Complexity Rating: LOW     Problem List: pain affecting function, decrease ROM, decrease strength, edema affecting function, impaired gait/ balance, decrease ADL/ functional abilitiies, decrease activity tolerance, decrease flexibility/ joint mobility and decrease transfer abilities   Treatment Plan may include any combination of the following: Therapeutic exercise, Therapeutic activities, Neuromuscular re-education, Physical agent/modality, Gait/balance training, Manual therapy, Patient education, Self Care training, Functional mobility training and Home safety training  Patient / Family readiness to learn indicated by: asking questions, trying to perform skills and interest  Persons(s) to be included in education: patient (P)  Barriers to Learning/Limitations: None  Patient Goal (s): Maybe it can help me with some of my back pain  Patient Self Reported Health Status: fair  Rehabilitation Potential: fair    Short and Long Term Goals: To be accomplished in 12 treatments:   1. Pt will be independent and compliant with HEP. 2. Pt will improve FOTO score by the MCID from 36/100 to 61/100 demonstrating improved overall function with decreased pain or discomfort. 3. Pt will be able to pick items up off the floor without complaints of increased back pain. 4. Pt will be able to tolerate standing >/= 20 minutes without complaints of increased back pain in order to perform functional activities with increased efficiency. 5. Pt will demonstrate and report improved sitting and standing postural awareness. 6. Pt will be able to perform sit-to-stand transfers without UE support or complaints of increased back pain. 7. Pt will be able to ambulate without complaints of right ankle pain. Frequency / Duration: Patient to be seen 1-2 times per week for 12 treatments.     Patient/ Caregiver education and instruction: self care, activity modification and exercises    [x]  Plan of care has been reviewed with JESSENIA Ballard 6/27/2019     ________________________________________________________________________    I certify that the above Therapy Services are being furnished while the patient is under my care. I agree with the treatment plan and certify that this therapy is necessary.     [de-identified] Signature:____________________  Date:____________Time: _________

## 2019-07-03 ENCOUNTER — HOSPITAL ENCOUNTER (OUTPATIENT)
Dept: PHYSICAL THERAPY | Age: 59
Discharge: HOME | End: 2019-07-03
Payer: MEDICARE

## 2019-07-03 PROCEDURE — 97110 THERAPEUTIC EXERCISES: CPT

## 2019-07-03 PROCEDURE — 97014 ELECTRIC STIMULATION THERAPY: CPT

## 2019-07-03 NOTE — PROGRESS NOTES
PT DAILY TREATMENT NOTE - [facility] 2-15    Patient Name: Cholo Abbott  Date:7/3/2019  : 1960  [x]  Patient  Verified  Payor: Sreekanth Garcia / Plan: [facility] [employer] / Product Type: Managed Care Medicaid /    In time:1:00 pm  Out time:2:06 pm  Total Treatment Time (min): 66 minutes  Total Timed Codes (min): 51 minutes  1:1 Treatment Time ( only): 45 minutes   Visit #: 2    Treatment Area: Right ankle pain [M25.571]  Low back pain [M54.5]    SUBJECTIVE  Pain Level (0-10 scale): 8/10  Any medication changes, allergies to medications, adverse drug reactions, diagnosis change, or new procedure performed?: [x] No    [] Yes (see summary sheet for update)  Subjective functional status/changes:   [] No changes reported  Pt reports she has been having significant right ankle and knee pain, particularly with stairs and ambulation. Her back has been [place]" with good days and bad days. OBJECTIVE    Modality rationale: decrease pain and increase tissue extensibility to improve the patients ability to perform ADLs with decreased pain or discomfort.    Min Type Additional Details      15 [x] Estim: []Att   []Unatt    []TENS instruct                  []IFC  [x]Premod   []NMES                     []Other:  []w/US   []w/ice   [x]w/heat  Position: Supine  Location: Lumbar       []  Traction: [] Cervical       []Lumbar                       [] Prone          []Supine                       []Intermittent   []Continuous Lbs:  [] before manual  [] after manual  []w/heat    []  Ultrasound: []Continuous   [] Pulsed                       at: []1MHz   []3MHz Location:  W/cm2:    [] Paraffin         Location:   []w/heat    []  Ice     []  Heat  []  Ice massage Position:  Location:    []  Laser  []  Other: Position:  Location:      []  Vasopneumatic Device Pressure:       [] lo [] med [] hi   Temperature:      [x] Skin assessment post-treatment:  [x]intact []redness- no adverse reaction    []redness  adverse reaction:     51 min Therapeutic Exercise:  [] See flow sheet :   Rationale: increase ROM and increase strength to improve the patients ability to perform ADLs with decreased pain or discomfort. With   [x] TE   [] TA   [] neuro   [] other: Patient Education: [x] Review HEP    [] Progressed/Changed HEP based on:   [] positioning   [] body mechanics   [] transfers   [] heat/ice application    [] other:      Other Objective/Functional Measures: --     Pain Level (0-10 scale) post treatment: 7/10    ASSESSMENT/Changes in Function:   Instructed Pt to utilize [facility] in [facility] to provide more stability and support for RLE with ambulation. Pt required significant cuing for proper form and technique while reviewing her HEP. Added hamstring stretch to improve LE flexibility and improve gait mechanics. Also initiated light hip strengthening to improve functional mobility with decreased pain; Pt noted significant challenge with bridges in supine. Patient will continue to benefit from skilled PT services to modify and progress therapeutic interventions, address functional mobility deficits, address ROM deficits, address strength deficits, analyze and address soft tissue restrictions, analyze and cue movement patterns, analyze and modify body mechanics/ergonomics, assess and modify postural abnormalities, address imbalance/dizziness and instruct in home and community integration to attain remaining goals. [x]  See Plan of Care  []  See progress note/recertification  []  See Discharge Summary         Progress towards goals / Updated goals:     Short and Long Term Goals: To be accomplished in 12 treatments:               1. Pt will be independent and compliant with HEP. 2. Pt will improve FOTO score by the MCID from 36/100 to 61/100 demonstrating improved overall function with decreased pain or discomfort.                 3. Pt will be able to pick items up off the floor without complaints of increased back pain.                4. Pt will be able to tolerate standing >/= 20 minutes without complaints of increased back pain in order to perform functional activities with increased efficiency. 5. Pt will demonstrate and report improved sitting and standing postural awareness. 6. Pt will be able to perform sit-to-stand transfers without UE support or complaints of increased back pain. 7. Pt will be able to ambulate without complaints of right ankle pain.     PLAN  [x]  Upgrade activities as tolerated     [x]  Continue plan of care  []  Update interventions per flow sheet       []  Discharge due to:_  []  Other:_      Roseann Mora 7/3/2019

## 2019-07-10 ENCOUNTER — HOSPITAL ENCOUNTER (OUTPATIENT)
Dept: PHYSICAL THERAPY | Age: 59
Discharge: HOME | End: 2019-07-10
Payer: MEDICARE

## 2019-07-10 PROCEDURE — 97110 THERAPEUTIC EXERCISES: CPT

## 2019-07-10 PROCEDURE — 97014 ELECTRIC STIMULATION THERAPY: CPT

## 2019-07-10 NOTE — PROGRESS NOTES
PT DAILY TREATMENT NOTE - [facility] 2-15    Patient Name: Ana Leone  Date:7/10/2019  : 1960  [x]  Patient  Verified  Payor: Adilson Members / Plan: [place] / Product Type: Medicare /    In time:1:05 pm  Out time:2:10 pm  Total Treatment Time (min): 65 minutes  Total Timed Codes (min): 50 minutes  1:1 Treatment Time ( only): 25 minutes   Visit #: 3    Treatment Area: Right ankle pain [M25.571]  Low back pain [M54.5]    SUBJECTIVE  Pain Level (0-10 scale): 8/10  Any medication changes, allergies to medications, adverse drug reactions, diagnosis change, or new procedure performed?: [x] No    [] Yes (see summary sheet for update)  Subjective functional status/changes:   [] No changes reported  Pt reports her knee has been better. Pt notes some discomfort in ankle primarily with stairs and ambulation. She was noting some increased back pain over the weekend with house chores. OBJECTIVE    Modality rationale: decrease pain and increase tissue extensibility to improve the patients ability to perform ADLs with decreased pain or discomfort.    Min Type Additional Details      15 [x] Estim: []Att   []Unatt    []TENS instruct                  []IFC  [x]Premod   []NMES                     []Other:  []w/US   []w/ice   [x]w/heat  Position: Supine  Location: Lumbar       []  Traction: [] Cervical       []Lumbar                       [] Prone          []Supine                       []Intermittent   []Continuous Lbs:  [] before manual  [] after manual  []w/heat    []  Ultrasound: []Continuous   [] Pulsed                       at: []1MHz   []3MHz Location:  W/cm2:    [] Paraffin         Location:   []w/heat    []  Ice     []  Heat  []  Ice massage Position:  Location:    []  Laser  []  Other: Position:  Location:      []  Vasopneumatic Device Pressure:       [] lo [] med [] hi   Temperature:      [x] Skin assessment post-treatment:  [x]intact []redness- no adverse reaction    []redness  adverse reaction: 50 min Therapeutic Exercise:  [] See flow sheet :   Rationale: increase ROM and increase strength to improve the patients ability to perform ADLs with decreased pain or discomfort. With   [x] TE   [] TA   [] neuro   [] other: Patient Education: [x] Review HEP    [] Progressed/Changed HEP based on:   [] positioning   [] body mechanics   [] transfers   [] heat/ice application    [] other:      Other Objective/Functional Measures: --     Pain Level (0-10 scale) post treatment: 6/10    ASSESSMENT/Changes in Function:   Added open books to HEP to promote more mobility and stretch to back musculature; Pt able to tolerate with cues to limit ROM to pain-free. Also introduced SWB 3-way stretch to decrease tightness of PS musculature. Patient will continue to benefit from skilled PT services to modify and progress therapeutic interventions, address functional mobility deficits, address ROM deficits, address strength deficits, analyze and address soft tissue restrictions, analyze and cue movement patterns, analyze and modify body mechanics/ergonomics, assess and modify postural abnormalities, address imbalance/dizziness and instruct in home and community integration to attain remaining goals. [x]  See Plan of Care  []  See progress note/recertification  []  See Discharge Summary         Progress towards goals / Updated goals:     Short and Long Term Goals: To be accomplished in 12 treatments:               1. Pt will be independent and compliant with HEP. 2. Pt will improve FOTO score by the MCID from 36/100 to 61/100 demonstrating improved overall function with decreased pain or discomfort. 3. Pt will be able to pick items up off the floor without complaints of increased back pain. 4. Pt will be able to tolerate standing >/= 20 minutes without complaints of increased back pain in order to perform functional activities with increased efficiency.                5. Pt will demonstrate and report improved sitting and standing postural awareness. 6. Pt will be able to perform sit-to-stand transfers without UE support or complaints of increased back pain. 7. Pt will be able to ambulate without complaints of right ankle pain.     PLAN  [x]  Upgrade activities as tolerated     [x]  Continue plan of care  []  Update interventions per flow sheet       []  Discharge due to:_  []  Other:_      Aydee Mosqueda 7/10/2019

## 2019-07-11 ENCOUNTER — HOSPITAL ENCOUNTER (OUTPATIENT)
Dept: PHYSICAL THERAPY | Age: 59
Discharge: HOME | End: 2019-07-11
Payer: MEDICARE

## 2019-07-11 PROCEDURE — 97112 NEUROMUSCULAR REEDUCATION: CPT

## 2019-07-11 PROCEDURE — 97110 THERAPEUTIC EXERCISES: CPT

## 2019-07-11 PROCEDURE — 97014 ELECTRIC STIMULATION THERAPY: CPT

## 2019-07-11 NOTE — PROGRESS NOTES
PT DAILY TREATMENT NOTE - [facility] 2-15    Patient Name: Myranda Price  Date:2019  : 1960  [x]  Patient  Verified  Payor: Jhony Carrera / Plan: [place] PART [place] / Product Type: Medicare /    In time:12:33 pm  Out time: 1:53 pm  Total Treatment Time (min): 80 minutes  Total Timed Codes (min): 65 minutes  1:1 Treatment Time ( only): 58 minutes   Visit #: 4    Treatment Area: Right ankle pain [M25.571]  Low back pain [M54.5]    SUBJECTIVE  Pain Level (0-10 scale): 8/10  Any medication changes, allergies to medications, adverse drug reactions, diagnosis change, or new procedure performed?: [x] No    [] Yes (see summary sheet for update)  Subjective functional status/changes:   [] No changes reported  Pt reports she felt \"pretty good\" after last session and states her back feels a litlte bit better today. OBJECTIVE    Modality rationale: decrease pain and increase tissue extensibility to improve the patients ability to perform ADLs with decreased pain or discomfort.    Min Type Additional Details      15 [x] Estim: []Att   []Unatt    []TENS instruct                  []IFC  [x]Premod   []NMES                     []Other:  []w/US   []w/ice   [x]w/heat  Position: Supine  Location: Lumbar       []  Traction: [] Cervical       []Lumbar                       [] Prone          []Supine                       []Intermittent   []Continuous Lbs:  [] before manual  [] after manual  []w/heat    []  Ultrasound: []Continuous   [] Pulsed                       at: []1MHz   []3MHz Location:  W/cm2:    [] Paraffin         Location:   []w/heat   15 [x]  Ice     []  Heat  []  Ice massage Position: Supine  Location: R ankle    []  Laser  []  Other: Position:  Location:      []  Vasopneumatic Device Pressure:       [] lo [] med [] hi   Temperature:      [x] Skin assessment post-treatment:  [x]intact []redness- no adverse reaction    []redness  adverse reaction:     51 min Therapeutic Exercise:  [] See flow sheet : Rationale: increase ROM and increase strength to improve the patients ability to perform ADLs with decreased pain or discomfort. 14 min Neuromuscular Re-education:  []  See flow sheet : rockerboard, tandem stance on compliant surface   Rationale: increase strength, improve balance and increase proprioception  to improve the patients ability to ambulate with decreased pain and increased stability. With   [x] TE   [] TA   [] neuro   [] other: Patient Education: [x] Review HEP    [] Progressed/Changed HEP based on:   [] positioning   [] body mechanics   [] transfers   [] heat/ice application    [] other:      Other Objective/Functional Measures: --     Pain Level (0-10 scale) post treatment: 7/10    ASSESSMENT/Changes in Function:   Challenged Pt balance today with rockerboard as well as tandem stance on compliant surface to improve proprioceptive input as well as lower extremity strength/stability; Pt demonstrated increased challenge with A-P versus side-side on rockerboard. Pt presents with decreased right>left rotation; reinforced with open books stretch. Patient will continue to benefit from skilled PT services to modify and progress therapeutic interventions, address functional mobility deficits, address ROM deficits, address strength deficits, analyze and address soft tissue restrictions, analyze and cue movement patterns, analyze and modify body mechanics/ergonomics, assess and modify postural abnormalities, address imbalance/dizziness and instruct in home and community integration to attain remaining goals. [x]  See Plan of Care  []  See progress note/recertification  []  See Discharge Summary         Progress towards goals / Updated goals:     Short and Long Term Goals: To be accomplished in 12 treatments:               1. Pt will be independent and compliant with HEP.                2. Pt will improve FOTO score by the MCID from 36/100 to 61/100 demonstrating improved overall function with decreased pain or discomfort. 3. Pt will be able to pick items up off the floor without complaints of increased back pain. 4. Pt will be able to tolerate standing >/= 20 minutes without complaints of increased back pain in order to perform functional activities with increased efficiency. 5. Pt will demonstrate and report improved sitting and standing postural awareness. 6. Pt will be able to perform sit-to-stand transfers without UE support or complaints of increased back pain. 7. Pt will be able to ambulate without complaints of right ankle pain.     PLAN  [x]  Upgrade activities as tolerated     [x]  Continue plan of care  []  Update interventions per flow sheet       []  Discharge due to:_  []  Other:_      Rodolfo Perry 7/11/2019

## 2019-07-16 ENCOUNTER — OFFICE VISIT (OUTPATIENT)
Dept: INTERNAL MEDICINE CLINIC | Age: 59
End: 2019-07-16

## 2019-07-16 VITALS
WEIGHT: 193 LBS | BODY MASS INDEX: 27.63 KG/M2 | TEMPERATURE: 98.2 F | OXYGEN SATURATION: 98 % | HEIGHT: 70 IN | RESPIRATION RATE: 16 BRPM | HEART RATE: 94 BPM | SYSTOLIC BLOOD PRESSURE: 104 MMHG | DIASTOLIC BLOOD PRESSURE: 64 MMHG

## 2019-07-16 DIAGNOSIS — I10 ESSENTIAL HYPERTENSION: Primary | ICD-10-CM

## 2019-07-16 DIAGNOSIS — Z00.00 MEDICARE ANNUAL WELLNESS VISIT, SUBSEQUENT: ICD-10-CM

## 2019-07-16 LAB
CHOLEST SERPL-MCNC: 158 MG/DL
HDLC SERPL-MCNC: 34 MG/DL
LDL CHOLESTEROL POC: 97 MG/DL
NON-HDL CHOLESTEROL, 011976: 124
TCHOL/HDL RATIO (POC): 4.6
TRIGL SERPL-MCNC: 137 MG/DL

## 2019-07-16 NOTE — PROGRESS NOTES
Yobany Laguerre is a 61 y.o. female and presents with Hypertension and Annual Wellness Visit  . Subjective:  Hypertension Review:  The patient has essential hypertension  Diet and Lifestyle: generally follows a  low sodium diet, exercises sporadically  Home BP Monitoring: is not measured at home. Pertinent ROS: taking medications as instructed, no medication side effects noted, no TIA's, no chest pain on exertion, no dyspnea on exertion, no swelling of ankles. Back Pain Review:  Patient presents for evaluation of low back problems. Symptoms have been present for weeks and include pain in lower back (dull, mild in character;7 /10 in severity). Initial inciting a fall at [facility]. Symptoms are worst: at times. Alleviating factors identifiable by patient are lying flat, medication . Exacerbating factors identifiable by patient are bending forwards, bending backwards. Treatments so far initiated by patient: medication Previous lower back problems: reported. Previous workup: back surgery in the past.she has been undergoing physical therapy    GERD Review:   Patient has a history of gastroesophageal reflux with heartburn. Symptoms have been present for a few months. She denies dysphagia. She  has not lost weight. She denies melena, hematochezia, hematemesis, and coffee ground emesis. This has been associated with fullness after meals. She denies abdominal bloating and none. Medical therapy in the past has included proton pump inhibitor        Yobany Laguerre is a 61 y.o. female and presents for annual Medicare Wellness Visit. Problem List: Reviewed with patient and discussed risk factors.     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 No pertinent surgical history. SH: Reviewed with patient  Social History     Tobacco Use    Smoking status: Former Smoker    Smokeless tobacco: Never Used   Substance Use Topics    Alcohol use: Yes     Alcohol/week: 0.5 oz     Types: 1 Glasses of wine per week     Comment: SOCIALLY    Drug use: No       FH: Reviewed with patient  Family History   Problem Relation Age of Onset    Diabetes Mother     Hypertension Mother     Hypertension Father     Cancer Father        Medications/Allergies: Reviewed with patient  Current Outpatient Medications on File Prior to Visit   Medication Sig Dispense Refill    baclofen (LIORESAL) 10 mg tablet Take 1 Tab by mouth three (3) times daily. 90 Tab 1    latanoprost (XALATAN) 0.005 % ophthalmic solution Administer 1 Drop to both eyes nightly.  cholecalciferol (VITAMIN D3) 1,000 unit cap Take 2,000 Units by mouth daily.  losartan (COZAAR) 100 mg tablet Take 100 mg by mouth daily.  cyclobenzaprine (FLEXERIL) 10 mg tablet Take 1 Tab by mouth three (3) times daily as needed for Muscle Spasm(s). 20 Tab 0    cloNIDine HCl (CATAPRES) 0.1 mg tablet TAKE ONE TABLET BY MOUTH TWICE DAILY 60 Tab 3    famotidine (PEPCID) 20 mg tablet Take 20 mg by mouth two (2) times a day.  fluticasone propionate (FLONASE) 50 mcg/actuation nasal spray 2 Sprays by Both Nostrils route daily. 1 Bottle 12    bumetanide (BUMEX) 1 mg tablet TAKE ONE TABLET BY MOUTH ONCE DAILY 30 Tab 12    potassium chloride (KLOR-CON) 10 mEq tablet Take 2 Tabs by mouth daily. 60 Tab 5    amLODIPine (NORVASC) 10 mg tablet Take 1 Tab by mouth daily.  30 Tab 3    naproxen (NAPROSYN) 500 mg tablet TAKE ONE TABLET BY MOUTH TWICE DAILY WITH  A  MEAL 60 Tab 6    cyclobenzaprine (FLEXERIL) 10 mg tablet TAKE ONE TABLET BY MOUTH TWICE DAILY (Patient taking differently: TAKE ONE TABLET BY MOUTH TWICE DAILY as needed) 60 Tab 12    hydroxychloroquine (PLAQUENIL) 200 mg tablet Take 400 mg by mouth daily. No current facility-administered medications on file prior to visit. No Known Allergies    Objective:  Visit Vitals  /64   Pulse 94   Temp 98.2 °F (36.8 °C) (Oral)   Resp 16   Ht 5' 10\" (1.778 m)   Wt 193 lb (87.5 kg)   SpO2 98%   BMI 27.69 kg/m²    Body mass index is 27.69 kg/m². Assessment of cognitive impairment: Alert and oriented x 3    Depression Screen:   3 most recent PHQ Screens 1/15/2019   PHQ Not Done -   Little interest or pleasure in doing things Not at all   Feeling down, depressed, irritable, or hopeless Not at all   Total Score PHQ 2 0     Depression Review:  Patient is seen for screen of depression,denies anhedonia, weight gain, insomnia, hypersomnia, psychomotor agitation, psychomotor retardation, fatigue, feelings of worthlessness/guilt, difficulty concentrating, hopelessness, impaired memory and recurrent thoughts of death Treatment includes no medication   She denies recurrent thoughts of death and suicidal thoughts without plan. Fall Risk Assessment:    Fall Risk Assessment, last 12 mths 1/15/2019   Able to walk? Yes   Fall in past 12 months? No       Functional Ability:   Does the patient exhibit a steady gait? yes   How long did it take the patient to get up and walk from a sitting position? seconds   Is the patient self reliant?  (ie can do own laundry, meals, household chores)  yes     Does the patient handle his/her own medications? yes     Does the patient handle his/her own money? yes     Is the patients home safe (ie good lighting, handrails on stairs and bath, etc.)? yes     Did you notice or did patient express any hearing difficulties?    no     Did you notice or did patient express any vision difficulties?   no     Were distance and reading eye charts used? no       Advance Care Planning:   Patient was offered the opportunity to discuss advance care planning:  yes     Does patient have an Advance Directive:  no   If no, did you provide information on [facility]? yes       Plan:      Orders Placed This Encounter    AMB POC LIPID PROFILE       Health Maintenance   Topic Date Due    MEDICARE YEARLY EXAM  07/03/2019    Shingrix Vaccine Age 50> (1 of 2) 09/23/2019 (Originally 4/4/2010)    FOBT Q 1 YEAR AGE 50-75  01/15/2020 (Originally 2/2/2017)    Influenza Age 5 to Adult  08/01/2019    BREAST CANCER SCRN MAMMOGRAM  11/13/2020    PAP AKA CERVICAL CYTOLOGY  10/24/2021    DTaP/Tdap/Td series (2 - Td) 11/17/2025    Hepatitis C Screening  Addressed    Pneumococcal 0-64 years  Aged Out       *Patient verbalized understanding and agreement with the plan. A copy of the After Visit Summary with personalized health plan was given to the patient today. Review of Systems  Constitutional: negative for fevers, chills, anorexia and weight loss  Eyes:   negative for visual disturbance and irritation  ENT:   negative for tinnitus,sore throat,nasal congestion,ear pains. hoarseness  Respiratory:  negative for cough, hemoptysis, dyspnea,wheezing  CV:   negative for chest pain, palpitations, lower extremity edema  GI:   negative for nausea, vomiting, diarrhea, abdominal pain,melena  Endo:               negative for polyuria,polydipsia,polyphagia,heat intolerance  Genitourinary: negative for frequency, dysuria and hematuria  Integument:  negative for rash and pruritus  Hematologic:  negative for easy bruising and gum/nose bleeding  Musculoskel:  back pain,   Neurological:  negative for headaches, dizziness, vertigo, memory problems and gait   Behavl/Psych: negative for feelings of anxiety, depression, mood changes    Past Medical History:   Diagnosis Date    Anemia, unspecified 2/21/2011    Arthritis, Degenerative,Knee 2/21/2011    Degenerative Disc Disease 2/21/2011    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 No pertinent surgical history. Social History     Socioeconomic History    Marital status: UNKNOWN     Spouse name: Not on file    Number of children: Not on file    Years of education: Not on file    Highest education level: Not on file   Tobacco Use    Smoking status: Former Smoker    Smokeless tobacco: Never Used   Substance and Sexual Activity    Alcohol use: Yes     Alcohol/week: 0.5 oz     Types: 1 Glasses of wine per week     Comment: SOCIALLY    Drug use: No    Sexual activity: Never     Family History   Problem Relation Age of Onset    Diabetes Mother     Hypertension Mother     Hypertension Father     Cancer Father      Current Outpatient Medications   Medication Sig Dispense Refill    baclofen (LIORESAL) 10 mg tablet Take 1 Tab by mouth three (3) times daily. 90 Tab 1    latanoprost (XALATAN) 0.005 % ophthalmic solution Administer 1 Drop to both eyes nightly.  cholecalciferol (VITAMIN D3) 1,000 unit cap Take 2,000 Units by mouth daily.  losartan (COZAAR) 100 mg tablet Take 100 mg by mouth daily.  cyclobenzaprine (FLEXERIL) 10 mg tablet Take 1 Tab by mouth three (3) times daily as needed for Muscle Spasm(s). 20 Tab 0    cloNIDine HCl (CATAPRES) 0.1 mg tablet TAKE ONE TABLET BY MOUTH TWICE DAILY 60 Tab 3    famotidine (PEPCID) 20 mg tablet Take 20 mg by mouth two (2) times a day.  fluticasone propionate (FLONASE) 50 mcg/actuation nasal spray 2 Sprays by Both Nostrils route daily. 1 Bottle 12    bumetanide (BUMEX) 1 mg tablet TAKE ONE TABLET BY MOUTH ONCE DAILY 30 Tab 12    potassium chloride (KLOR-CON) 10 mEq tablet Take 2 Tabs by mouth daily.  60 Tab 5    amLODIPine (NORVASC) 10 mg tablet Take 1 Tab by mouth daily. 30 Tab 3    naproxen (NAPROSYN) 500 mg tablet TAKE ONE TABLET BY MOUTH TWICE DAILY WITH  A  MEAL 60 Tab 6    cyclobenzaprine (FLEXERIL) 10 mg tablet TAKE ONE TABLET BY MOUTH TWICE DAILY (Patient taking differently: TAKE ONE TABLET BY MOUTH TWICE DAILY as needed) 60 Tab 12    hydroxychloroquine (PLAQUENIL) 200 mg tablet Take 400 mg by mouth daily. No Known Allergies    Objective:  Visit Vitals  /64   Pulse 94   Temp 98.2 °F (36.8 °C) (Oral)   Resp 16   Ht 5' 10\" (1.778 m)   Wt 193 lb (87.5 kg)   SpO2 98%   BMI 27.69 kg/m²     Physical Exam:   General appearance - alert, well appearing, and in no distress  Mental status - alert, oriented to person, place, and time  EYE-KASEY, EOMI, corneas normal, no foreign bodies  ENT-ENT exam normal, no neck nodes or sinus tenderness  Nose - normal and patent, no erythema, discharge or polyps  Mouth - mucous membranes moist, pharynx normal without lesions  Neck - supple, no significant adenopathy   Chest - clear to auscultation, no wheezes, rales or rhonchi, symmetric air entry   Heart - normal rate, regular rhythm, normal S1, S2, no murmurs, rubs, clicks or gallops   Abdomen - soft, nontender, nondistended, no masses or organomegaly  Lymph- no adenopathy palpable  Ext-peripheral pulses normal, no pedal edema, no clubbing or cyanosis  Skin-Warm and dry.  no hyperpigmentation, vitiligo, or suspicious lesions  Neuro -alert, oriented, normal speech, no focal findings or movement disorder noted  Neck-normal C-spine, no tenderness, full ROM without pain  Feet-no nail deformities or callus formation with good pulses noted      Results for orders placed or performed in visit on 07/16/19   AMB POC LIPID PROFILE   Result Value Ref Range    Cholesterol (POC) 158     Triglycerides (POC) 137     HDL Cholesterol (POC) 34     Non-HDL Cholesterol 124     LDL Cholesterol (POC) 97 MG/DL    TChol/HDL Ratio (POC) 4.6        Assessment/Plan:    ICD-10-CM ICD-9-CM    1. Essential hypertension I10 401.9 AMB POC LIPID PROFILE   2. Medicare annual wellness visit, subsequent Z00.00 V70.0      Orders Placed This Encounter    AMB POC LIPID PROFILE     lose weight, follow low fat diet, follow low salt diet, continue present plan  Patient Instructions   MyChart Activation    Thank you for requesting access to [employer] [facility]. Please follow the instructions below to securely access and download your online medical record. [facility] allows you to send messages to your doctor, view your test results, renew your prescriptions, schedule appointments, and more. How Do I Sign Up? 1. In your internet browser, go to [email or web address]  2. Click on the First Time User? Click Here link in the Sign In box. You will be redirect to the New Member Sign Up page. 3. Enter your [employer] Access Code exactly as it appears below. You will not need to use this code after youve completed the sign-up process. If you do not sign up before the expiration date, you must request a new code. [facility] Access Code: [email or web address]  Expires: 2019  8:46 AM (This is the date your [facility] access code will )    4. Enter the last four digits of your Social Security Number (xxxx) and Date of Birth (mm/dd/yyyy) as indicated and click Submit. You will be taken to the next sign-up page. 5. Create a [facility] ID. This will be your [employer] [facility] login ID and cannot be changed, so think of one that is secure and easy to remember. 6. Create a [employer] [facility] password. You can change your password at any time. 7. Enter your Password Reset Question and Answer. This can be used at a later time if you forget your password. 8. Enter your e-mail address. You will receive e-mail notification when new information is available in [address] 9. Click Sign Up. You can now view and download portions of your medical record.   10. Click the Download Summary menu link to download a portable copy of your medical information. Additional Information    If you have questions, please visit the Frequently Asked Questions section of the [employer] website at [email or web address] Remember, [facility] is NOT to be used for urgent needs. For medical emergencies, dial 911. Follow-up and Dispositions    · Return in about 1 month (around 8/13/2019). I have reviewed with the patient details of the assessment and plan and all questions were answered. Relevent patient education was performed    An After Visit Summary was printed and given to the patient.

## 2019-07-16 NOTE — PROGRESS NOTES
1. Have you been to the ER, urgent care clinic since your last visit? Hospitalized since your last visit?no    2. Have you seen or consulted any other health care providers outside of the [address] [place] [address] since your last visit? Include any pap smears or colon screening. No    3 most recent PHQ Screens 1/15/2019   PHQ Not Done -   Little interest or pleasure in doing things Not at all   Feeling down, depressed, irritable, or hopeless Not at all   Total Score PHQ 2 0     Chief Complaint   Patient presents with    Hypertension     Per Dr. Jose Thompson.,  verbal order given for needed amb poc labs.

## 2019-07-16 NOTE — PATIENT INSTRUCTIONS
[facility] Activation    Thank you for requesting access to [facility]. Please follow the instructions below to securely access and download your online medical record. [facility] allows you to send messages to your doctor, view your test results, renew your prescriptions, schedule appointments, and more. How Do I Sign Up? 1. In your internet browser, go to [email or web address]  2. Click on the First Time User? Click Here link in the Sign In box. You will be redirect to the New Member Sign Up page. 3. Enter your [employer] Access Code exactly as it appears below. You will not need to use this code after youve completed the sign-up process. If you do not sign up before the expiration date, you must request a new code. [employer] Access Code: [email or web address]  Expires: 2019  8:46 AM (This is the date your [employer] access code will )    4. Enter the last four digits of your Social Security Number (xxxx) and Date of Birth (mm/dd/yyyy) as indicated and click Submit. You will be taken to the next sign-up page. 5. Create a [employer] ID. This will be your [employer] login ID and cannot be changed, so think of one that is secure and easy to remember. 6. Create a [facility] password. You can change your password at any time. 7. Enter your Password Reset Question and Answer. This can be used at a later time if you forget your password. 8. Enter your e-mail address. You will receive e-mail notification when new information is available in [address] 9. Click Sign Up. You can now view and download portions of your medical record. 10. Click the Download Summary menu link to download a portable copy of your medical information. Additional Information    If you have questions, please visit the Frequently Asked Questions section of the [employer] website at [email or web address] Remember, [employer] is NOT to be used for urgent needs. For medical emergencies, dial 911.

## 2019-07-17 ENCOUNTER — HOSPITAL ENCOUNTER (OUTPATIENT)
Dept: PHYSICAL THERAPY | Age: 59
Discharge: HOME | End: 2019-07-17
Payer: MEDICARE

## 2019-07-17 PROCEDURE — 97140 MANUAL THERAPY 1/> REGIONS: CPT

## 2019-07-17 PROCEDURE — 97110 THERAPEUTIC EXERCISES: CPT

## 2019-07-17 PROCEDURE — 97112 NEUROMUSCULAR REEDUCATION: CPT

## 2019-07-17 PROCEDURE — 97014 ELECTRIC STIMULATION THERAPY: CPT

## 2019-07-17 NOTE — PROGRESS NOTES
PT DAILY TREATMENT NOTE - [facility] 2-15    Patient Name: Tre Zamora  Date:2019  : 1960  [x]  Patient  Verified  Payor: Vinod Mercado / Plan: [place] MED[place] PART A & B / Product Type: Medicare /    In time:1:00 pm  Out time: 2:16 pm  Total Treatment Time (min): 76 minutes  Total Timed Codes (min): 61 minutes  1:1 Treatment Time ( only): 40 minutes   Visit #: 5    Treatment Area: Right ankle pain [M25.571]  Low back pain [M54.5]    SUBJECTIVE  Pain Level (0-10 scale): 6/10  Any medication changes, allergies to medications, adverse drug reactions, diagnosis change, or new procedure performed?: [x] No    [] Yes (see summary sheet for update)  Subjective functional status/changes:   [] No changes reported  Pt reports she was getting cramps in hamstrings last night which woke her up. Pt reports her back has been somewhat better. Pt notes continued mild pain within ankle with walking. OBJECTIVE    Modality rationale: decrease pain and increase tissue extensibility to improve the patients ability to perform ADLs with decreased pain or discomfort.    Min Type Additional Details      15 [x] Estim: []Att   []Unatt    []TENS instruct                  []IFC  [x]Premod   []NMES                     []Other:  []w/US   []w/ice   [x]w/heat  Position: Supine  Location: Lumbar       []  Traction: [] Cervical       []Lumbar                       [] Prone          []Supine                       []Intermittent   []Continuous Lbs:  [] before manual  [] after manual  []w/heat    []  Ultrasound: []Continuous   [] Pulsed                       at: []1MHz   []3MHz Location:  W/cm2:    [] Paraffin         Location:   []w/heat    []  Ice     []  Heat  []  Ice massage Position:  Location:    []  Laser  []  Other: Position:  Location:      []  Vasopneumatic Device Pressure:       [] lo [] med [] hi   Temperature:      [x] Skin assessment post-treatment:  [x]intact []redness- no adverse reaction    []redness  adverse reaction: 38 min Therapeutic Exercise:  [] See flow sheet :   Rationale: increase ROM and increase strength to improve the patients ability to perform ADLs with decreased pain or discomfort. 13 min Neuromuscular Re-education:  []  See flow sheet : rockerboard, tandem stance on compliant surface   Rationale: increase strength, improve balance and increase proprioception  to improve the patients ability to ambulate with decreased pain and increased stability. 10 min Manual Therapy: STM and MFR of left hamstring musculature; contract-relax hamstring stretch in supine    Rationale: decrease pain, increase ROM and increase tissue extensibility to improve the patients ability to ambulate with decreased pain. With   [x] TE   [] TA   [] neuro   [] other: Patient Education: [x] Review HEP    [] Progressed/Changed HEP based on:   [] positioning   [] body mechanics   [] transfers   [] heat/ice application    [] other:      Other Objective/Functional Measures: --     Pain Level (0-10 scale) post treatment: 0/10    ASSESSMENT/Changes in Function:   Provided manual treatment and contract-relax stretch to decrease tightness/cramping within hamstring musculature; Pt responded well and was able to tolerate rest of session without c/o any cramps. Progressed supine clam shells to side-lying with increased resistance, and Pt was able to perform without pain. Patient will continue to benefit from skilled PT services to modify and progress therapeutic interventions, address functional mobility deficits, address ROM deficits, address strength deficits, analyze and address soft tissue restrictions, analyze and cue movement patterns, analyze and modify body mechanics/ergonomics, assess and modify postural abnormalities, address imbalance/dizziness and instruct in home and community integration to attain remaining goals.      [x]  See Plan of Care  []  See progress note/recertification  []  See Discharge Summary         Progress towards goals / Updated goals:     Short and Long Term Goals: To be accomplished in 12 treatments:               1. Pt will be independent and compliant with HEP. 2. Pt will improve FOTO score by the MCID from 36/100 to 61/100 demonstrating improved overall function with decreased pain or discomfort. 3. Pt will be able to pick items up off the floor without complaints of increased back pain. 4. Pt will be able to tolerate standing >/= 20 minutes without complaints of increased back pain in order to perform functional activities with increased efficiency. 5. Pt will demonstrate and report improved sitting and standing postural awareness. 6. Pt will be able to perform sit-to-stand transfers without UE support or complaints of increased back pain. 7. Pt will be able to ambulate without complaints of right ankle pain.     PLAN  [x]  Upgrade activities as tolerated     [x]  Continue plan of care  []  Update interventions per flow sheet       []  Discharge due to:_  []  Other:_      Alexei Begin 7/17/2019

## 2019-07-25 ENCOUNTER — HOSPITAL ENCOUNTER (OUTPATIENT)
Dept: PHYSICAL THERAPY | Age: 59
Discharge: HOME | End: 2019-07-25
Payer: MEDICARE

## 2019-07-25 PROCEDURE — 97014 ELECTRIC STIMULATION THERAPY: CPT

## 2019-07-25 PROCEDURE — 97112 NEUROMUSCULAR REEDUCATION: CPT

## 2019-07-25 PROCEDURE — 97110 THERAPEUTIC EXERCISES: CPT

## 2019-07-25 NOTE — PROGRESS NOTES
[facility]  [address] ([place]), [address] [facility] Jonathan Tellez  Phone: [phone] Fax: [phone]    Progress Note    Name: Zahira Moe   : 1960   MD: Devon Gold MD       Treatment Diagnosis: Right ankle pain [M25.571]  Low back pain [M54.5]  Start of Care: 19    Visits from Start of Care: 6  Missed Visits: 0     Summary of Care: Ms. Óscar Lopez has been seen for 6 skilled physical therapy visits secondary to a right ankle sprain and low back pain. Pt is progressing well with her therapy program and is now able to stand for longer periods of time before onset of any increased pain. She is still experiencing some discomfort with walking up stairs and performing house chores, and would benefit from additional PT to further address her impairments. Thank you for this referral!    Assessment / Recommendations: 6 additional weeks    Short and Long Term Goals: To be accomplished in 12 treatments:               1. Pt will be independent and compliant with HEP. - Progressing               2. Pt will improve FOTO score by the MCID from 36/100 to 61/100 demonstrating improved overall function with decreased pain or discomfort. - Progressing               3. Pt will be able to pick items up off the floor without complaints of increased back pain. - Progressing (mild discomfort coming up)               4. Pt will be able to tolerate standing >/= 20 minutes without complaints of increased back pain in order to perform functional activities with increased efficiency. - MET (able to wash dishes)               5. Pt will demonstrate and report improved sitting and standing postural awareness.  - MET               6. Pt will be able to perform sit-to-stand transfers without UE support or complaints of increased back pain. - Progressing               7. Pt will be able to ambulate without complaints of right ankle pain.  - [address] [place] [address] 7/25/2019     ________________________________________________________________________  NOTE TO PHYSICIAN:  Please complete the following and fax to: [facility]: Fax: [phone]  . Retain this original for your records. If you are unable to process this request in 24 hours, please contact our office.        ____ I have read the above report and request that my patient continue therapy with the following changes/special instructions:  ____ I have read the above report and request that my patient be discharged from therapy    Physician's Signature:_________________ Date:___________Time:__________

## 2019-07-25 NOTE — PROGRESS NOTES
PT DAILY TREATMENT NOTE - [facility] 2-15    Patient Name: Duong Louise  Date:2019  : 1960  [x]  Patient  Verified  Payor: Mauro Ortiz / Plan: [place] MED[place] & [place] / Product Type: Medicare /    In time:3:00 pm  Out time: 4:25 pm  Total Treatment Time (min): 85 minutes  Total Timed Codes (min): 70 minutes  1:1 Treatment Time ( only): 29 minutes   Visit #: 6    Treatment Area: Right ankle pain [M25.571]  Low back pain [M54.5]    SUBJECTIVE  Pain Level (0-10 scale): 5/10  Any medication changes, allergies to medications, adverse drug reactions, diagnosis change, or new procedure performed?: [x] No    [] Yes (see summary sheet for update)  Subjective functional status/changes:   [] No changes reported  Pt reports she has been doing pretty good. Her hamstring cramps have been better. Pain has been improving. OBJECTIVE    Modality rationale: decrease pain and increase tissue extensibility to improve the patients ability to perform ADLs with decreased pain or discomfort.    Min Type Additional Details      15 [x] Estim: []Att   []Unatt    []TENS instruct                  []IFC  [x]Premod   []NMES                     []Other:  []w/US   []w/ice   [x]w/heat  Position: Supine  Location: Lumbar       []  Traction: [] Cervical       []Lumbar                       [] Prone          []Supine                       []Intermittent   []Continuous Lbs:  [] before manual  [] after manual  []w/heat    []  Ultrasound: []Continuous   [] Pulsed                       at: []1MHz   []3MHz Location:  W/cm2:    [] Paraffin         Location:   []w/heat    []  Ice     []  Heat  []  Ice massage Position:  Location:    []  Laser  []  Other: Position:  Location:      []  Vasopneumatic Device Pressure:       [] lo [] med [] hi   Temperature:      [x] Skin assessment post-treatment:  [x]intact []redness- no adverse reaction    []redness  adverse reaction:     60 min Therapeutic Exercise:  [] See flow sheet :   Rationale: increase ROM and increase strength to improve the patients ability to perform ADLs with decreased pain or discomfort. 10 min Neuromuscular Re-education:  []  See flow sheet : rockerboard, tandem stance on compliant surface   Rationale: increase strength, improve balance and increase proprioception  to improve the patients ability to ambulate with decreased pain and increased stability. With   [x] TE   [] TA   [] neuro   [] other: Patient Education: [x] Review HEP    [] Progressed/Changed HEP based on:   [] positioning   [] body mechanics   [] transfers   [] heat/ice application    [] other:      Other Objective/Functional Measures:   0-100: 50% improved - walking up steps, house chores with less discomfort  FOTO Score: 49/100 (36 on eval)    Pain Level (0-10 scale) post treatment: 0/10    ASSESSMENT/Changes in Function:      []  See Plan of Care  [x]  See progress note/recertification  []  See Discharge Summary         Progress towards goals / Updated goals:     Short and Long Term Goals: To be accomplished in 12 treatments:               1. Pt will be independent and compliant with HEP. - Progressing               2. Pt will improve FOTO score by the MCID from 36/100 to 61/100 demonstrating improved overall function with decreased pain or discomfort. - Progressing               3. Pt will be able to pick items up off the floor without complaints of increased back pain. - Progressing (mild discomfort coming up)               4. Pt will be able to tolerate standing >/= 20 minutes without complaints of increased back pain in order to perform functional activities with increased efficiency. - MET (able to wash dishes)               5. Pt will demonstrate and report improved sitting and standing postural awareness.  - MET               6. Pt will be able to perform sit-to-stand transfers without UE support or complaints of increased back pain.   - Progressing               7. Pt will be able to ambulate without complaints of right ankle pain.  - Progressing     PLAN  [x]  Upgrade activities as tolerated     [x]  Continue plan of care  []  Update interventions per flow sheet       []  Discharge due to:_  []  Other:_      Emy Corbett 7/25/2019

## 2019-07-29 ENCOUNTER — HOSPITAL ENCOUNTER (OUTPATIENT)
Dept: PHYSICAL THERAPY | Age: 59
Discharge: HOME | End: 2019-07-29
Payer: MEDICARE

## 2019-07-29 PROCEDURE — 97110 THERAPEUTIC EXERCISES: CPT

## 2019-07-29 PROCEDURE — 97112 NEUROMUSCULAR REEDUCATION: CPT

## 2019-07-29 PROCEDURE — 97014 ELECTRIC STIMULATION THERAPY: CPT

## 2019-07-29 NOTE — PROGRESS NOTES
PT DAILY TREATMENT NOTE - [facility] 2-15    Patient Name: Ana Leone  Date:2019  : 1960  [x]  Patient  Verified  Payor: Adilson Members / Plan: [place] MEDICARE PART A & B / Product Type: Medicare /    In time: 1:02 pm  Out time: 2:13 pm  Total Treatment Time (min): 71 minutes  Total Timed Codes (min): 56 minutes  1:1 Treatment Time ( only): 26 minutes   Visit #: 7    Treatment Area: Right ankle pain [M25.571]  Low back pain [M54.5]    SUBJECTIVE  Pain Level (0-10 scale): 5.5/10  Any medication changes, allergies to medications, adverse drug reactions, diagnosis change, or new procedure performed?: [x] No    [] Yes (see summary sheet for update)  Subjective functional status/changes:   [] No changes reported  Pt reports her back has been pretty good. She also states her ankle \"has not been too bad\" with walking. OBJECTIVE    Modality rationale: decrease pain and increase tissue extensibility to improve the patients ability to perform ADLs with decreased pain or discomfort.    Min Type Additional Details      15 [x] Estim: []Att   []Unatt    []TENS instruct                  []IFC  [x]Premod   []NMES                     []Other:  []w/US   []w/ice   [x]w/heat  Position: Supine  Location: Lumbar       []  Traction: [] Cervical       []Lumbar                       [] Prone          []Supine                       []Intermittent   []Continuous Lbs:  [] before manual  [] after manual  []w/heat    []  Ultrasound: []Continuous   [] Pulsed                       at: []1MHz   []3MHz Location:  W/cm2:    [] Paraffin         Location:   []w/heat    []  Ice     []  Heat  []  Ice massage Position:  Location:    []  Laser  []  Other: Position:  Location:      []  Vasopneumatic Device Pressure:       [] lo [] med [] hi   Temperature:      [x] Skin assessment post-treatment:  [x]intact []redness- no adverse reaction    []redness  adverse reaction:     40 min Therapeutic Exercise:  [] See flow sheet :   Rationale: increase ROM and increase strength to improve the patients ability to perform ADLs with decreased pain or discomfort. 16 min Neuromuscular Re-education:  []  See flow sheet : rockerboard, tandem stance on compliant surface   Rationale: increase strength, improve balance and increase proprioception  to improve the patients ability to ambulate with decreased pain and increased stability. With   [x] TE   [] TA   [] neuro   [] other: Patient Education: [x] Review HEP    [] Progressed/Changed HEP based on:   [] positioning   [] body mechanics   [] transfers   [] heat/ice application    [] other:      Other Objective/Functional Measures: --    Pain Level (0-10 scale) post treatment: 4.5/10    ASSESSMENT/Changes in Function:   Modified step-ups to 4\" forward and lateral due to impaired eccentric control and over-reliance of UEs; Pt demonstrated much improved form and technique. Pt able to perform all exercises without c/o increased pain. Patient will continue to benefit from skilled PT services to modify and progress therapeutic interventions, address functional mobility deficits, address ROM deficits, address strength deficits, analyze and address soft tissue restrictions, analyze and cue movement patterns, analyze and modify body mechanics/ergonomics, assess and modify postural abnormalities, address imbalance/dizziness and instruct in home and community integration to attain remaining goals. []  See Plan of Care  [x]  See progress note/recertification  []  See Discharge Summary         Progress towards goals / Updated goals:     Short and Long Term Goals: To be accomplished in 12 treatments:               1. Pt will be independent and compliant with HEP. - Progressing               2. Pt will improve FOTO score by the MCID from 36/100 to 61/100 demonstrating improved overall function with decreased pain or discomfort.  - Progressing               3. Pt will be able to pick items up off the floor without complaints of increased back pain. - Progressing (mild discomfort coming up)               4. Pt will be able to tolerate standing >/= 20 minutes without complaints of increased back pain in order to perform functional activities with increased efficiency. - MET (able to wash dishes)               5. Pt will demonstrate and report improved sitting and standing postural awareness.  - MET               6. Pt will be able to perform sit-to-stand transfers without UE support or complaints of increased back pain. - Progressing               7. Pt will be able to ambulate without complaints of right ankle pain.  - Progressing     PLAN  [x]  Upgrade activities as tolerated     [x]  Continue plan of care  []  Update interventions per flow sheet       []  Discharge due to:_  []  Other:_      Ashlee Darby 7/29/2019

## 2019-08-01 ENCOUNTER — HOSPITAL ENCOUNTER (OUTPATIENT)
Dept: PHYSICAL THERAPY | Age: 59
Discharge: HOME | End: 2019-08-01
Payer: MEDICARE

## 2019-08-01 PROCEDURE — 97110 THERAPEUTIC EXERCISES: CPT

## 2019-08-01 PROCEDURE — 97112 NEUROMUSCULAR REEDUCATION: CPT

## 2019-08-01 PROCEDURE — 97016 VASOPNEUMATIC DEVICE THERAPY: CPT

## 2019-08-01 NOTE — PROGRESS NOTES
PT DAILY TREATMENT NOTE - [facility] 2-15    Patient Name: Elvira Minaya  Date:2019  : 1960  [x]  Patient  Verified  Payor: Calli Lyle / Plan: [place] MEDICARE PART A & B / Product Type: Medicare /    In time: 12:36 pm  Out time: 1:45 pm   Total Treatment Time (min): 69 minutes  Total Timed Codes (min): 54 minutes  1:1 Treatment Time ( only): 42 minutes   Visit #: 8    Treatment Area: Right ankle pain [M25.571]  Low back pain [M54.5]     SUBJECTIVE  Pain Level (0-10 scale): 6.5/10  Any medication changes, allergies to medications, adverse drug reactions, diagnosis change, or new procedure performed?: [x] No    [] Yes (see summary sheet for update)  Subjective functional status/changes:   [] No changes reported  Pt reports her back is feeling good, just having increased discomfort within lateral right ankle today with walking. OBJECTIVE    Modality rationale: decrease pain and increase tissue extensibility to improve the patients ability to perform ADLs with decreased pain or discomfort.    Min Type Additional Details       [] Estim: []Att   []Unatt    []TENS instruct                  []IFC  []Premod   []NMES                     []Other:  []w/US   []w/ice   [x]w/heat  Position:   Location:        []  Traction: [] Cervical       []Lumbar                       [] Prone          []Supine                       []Intermittent   []Continuous Lbs:  [] before manual  [] after manual  []w/heat    []  Ultrasound: []Continuous   [] Pulsed                       at: []1MHz   []3MHz Location:  W/cm2:    [] Paraffin         Location:   []w/heat    []  Ice     []  Heat  []  Ice massage Position:  Location:    []  Laser  []  Other: Position:  Location:   15   [x]  Vasopneumatic Device Pressure:       [] lo [x] med [] hi   Temperature: 34     [x] Skin assessment post-treatment:  [x]intact []redness- no adverse reaction    []redness  adverse reaction:     36 min Therapeutic Exercise:  [] See flow sheet :   Rationale: increase ROM and increase strength to improve the patients ability to perform ADLs with decreased pain or discomfort. 18 min Neuromuscular Re-education:  []  See flow sheet : rockerboard, tandem stance on compliant surface   Rationale: increase strength, improve balance and increase proprioception  to improve the patients ability to ambulate with decreased pain and increased stability. With   [x] TE   [] TA   [] neuro   [] other: Patient Education: [x] Review HEP    [] Progressed/Changed HEP based on:   [] positioning   [] body mechanics   [] transfers   [] heat/ice application    [] other:      Other Objective/Functional Measures:   TTP right ATFL and proximal 5th metatarsal    Pain Level (0-10 scale) post treatment: 5/10    ASSESSMENT/Changes in Function:   Added 4-way ankle strengthening today with band resistance to facilitate ROM and lower leg strength; instructed in independent performance. Emphasized ankle stabilization and control today to decrease pain with ambulation. Pt demonstrates improved stability on rockerboard, though most challenge with control in anterior direction. Pt demonstrated difficulty with tandem stance balance on compliant surface today particularly with RLE posteriorly. Patient will continue to benefit from skilled PT services to modify and progress therapeutic interventions, address functional mobility deficits, address ROM deficits, address strength deficits, analyze and address soft tissue restrictions, analyze and cue movement patterns, analyze and modify body mechanics/ergonomics, assess and modify postural abnormalities, address imbalance/dizziness and instruct in home and community integration to attain remaining goals. []  See Plan of Care  [x]  See progress note/recertification  []  See Discharge Summary         Progress towards goals / Updated goals:     Short and Long Term Goals:  To be accomplished in 12 treatments:               1. Pt will be independent and compliant with HEP. - Progressing               2. Pt will improve FOTO score by the MCID from 36/100 to 61/100 demonstrating improved overall function with decreased pain or discomfort. - Progressing               3. Pt will be able to pick items up off the floor without complaints of increased back pain. - Progressing (mild discomfort coming up)               4. Pt will be able to tolerate standing >/= 20 minutes without complaints of increased back pain in order to perform functional activities with increased efficiency. - MET (able to wash dishes)               5. Pt will demonstrate and report improved sitting and standing postural awareness.  - MET               6. Pt will be able to perform sit-to-stand transfers without UE support or complaints of increased back pain. - Progressing               7. Pt will be able to ambulate without complaints of right ankle pain.  - Progressing     PLAN  [x]  Upgrade activities as tolerated     [x]  Continue plan of care  []  Update interventions per flow sheet       []  Discharge due to:_  []  Other:_      Aydee Mosqueda 8/1/2019

## 2019-08-07 ENCOUNTER — HOSPITAL ENCOUNTER (OUTPATIENT)
Dept: PHYSICAL THERAPY | Age: 59
Discharge: HOME | End: 2019-08-07
Payer: MEDICARE

## 2019-08-07 PROCEDURE — 97112 NEUROMUSCULAR REEDUCATION: CPT

## 2019-08-07 PROCEDURE — 97014 ELECTRIC STIMULATION THERAPY: CPT

## 2019-08-07 PROCEDURE — 97110 THERAPEUTIC EXERCISES: CPT

## 2019-08-07 NOTE — PROGRESS NOTES
PT DAILY TREATMENT NOTE - [facility] 2-15    Patient Name: Titus Gentile  Date:2019  : 1960  [x]  Patient  Verified  Payor: Mor Owens / Plan: [place] MEDICARE PART A & B / Product Type: Medicare /    In time: 1:00 pm  Out time: 2:25 pm   Total Treatment Time (min): 85 minutes  Total Timed Codes (min): 70 minutes  1:1 Treatment Time ( only): 30 minutes   Visit #: 9    Treatment Area: Right ankle pain [M25.571]  Low back pain [M54.5]     SUBJECTIVE  Pain Level (0-10 scale): 6.5/10  Any medication changes, allergies to medications, adverse drug reactions, diagnosis change, or new procedure performed?: [x] No    [] Yes (see summary sheet for update)  Subjective functional status/changes:   [] No changes reported  Pt reports she is having soreness in both right ankle and low back, thinks she may have overdone it with walking and cleaning house this past weekend. OBJECTIVE    Modality rationale: decrease pain and increase tissue extensibility to improve the patients ability to perform ADLs with decreased pain or discomfort.    Min Type Additional Details      15 [x] Estim: []Att   [x]Unatt    []TENS instruct                  []IFC  [x]Premod   []NMES                     []Other:  []w/US   []w/ice   [x]w/heat  Position: Supine  Location:  Lumbar       []  Traction: [] Cervical       []Lumbar                       [] Prone          []Supine                       []Intermittent   []Continuous Lbs:  [] before manual  [] after manual  []w/heat    []  Ultrasound: []Continuous   [] Pulsed                       at: []1MHz   []3MHz Location:  W/cm2:    [] Paraffin         Location:   []w/heat    []  Ice     []  Heat  []  Ice massage Position:  Location:    []  Laser  []  Other: Position:  Location:      []  Vasopneumatic Device Pressure:       [] lo [] med [] hi   Temperature:      [x] Skin assessment post-treatment:  [x]intact []redness- no adverse reaction    []redness  adverse reaction:     60 min Therapeutic Exercise:  [] See flow sheet :   Rationale: increase ROM and increase strength to improve the patients ability to perform ADLs with decreased pain or discomfort. 10 min Neuromuscular Re-education:  []  See flow sheet : rockerboard, tandem stance on compliant surface   Rationale: increase strength, improve balance and increase proprioception  to improve the patients ability to ambulate with decreased pain and increased stability. With   [x] TE   [] TA   [] neuro   [] other: Patient Education: [x] Review HEP    [] Progressed/Changed HEP based on:   [] positioning   [] body mechanics   [] transfers   [] heat/ice application    [] other:      Other Objective/Functional Measures: --    Pain Level (0-10 scale) post treatment: 3.5/10    ASSESSMENT/Changes in Function:   Pt demonstrated improved proprioceptive input and stabilization with rockerboard and balance today. Added hip hikes and resisted side steps to HEP today for increased lumbar and hip strength/stability and to further decrease back pain. Pt able to perform all exercises without increased pain. Patient will continue to benefit from skilled PT services to modify and progress therapeutic interventions, address functional mobility deficits, address ROM deficits, address strength deficits, analyze and address soft tissue restrictions, analyze and cue movement patterns, analyze and modify body mechanics/ergonomics, assess and modify postural abnormalities, address imbalance/dizziness and instruct in home and community integration to attain remaining goals. []  See Plan of Care  [x]  See progress note/recertification  []  See Discharge Summary         Progress towards goals / Updated goals:     Short and Long Term Goals: To be accomplished in 12 treatments:               1. Pt will be independent and compliant with HEP.  - Progressing               2. Pt will improve FOTO score by the MCID from 36/100 to 61/100 demonstrating improved overall function with decreased pain or discomfort. - Progressing               3. Pt will be able to pick items up off the floor without complaints of increased back pain. - Progressing (mild discomfort coming up)               4. Pt will be able to tolerate standing >/= 20 minutes without complaints of increased back pain in order to perform functional activities with increased efficiency. - MET (able to wash dishes)               5. Pt will demonstrate and report improved sitting and standing postural awareness.  - MET               6. Pt will be able to perform sit-to-stand transfers without UE support or complaints of increased back pain. - Progressing               7. Pt will be able to ambulate without complaints of right ankle pain.  - Progressing     PLAN  [x]  Upgrade activities as tolerated     [x]  Continue plan of care  []  Update interventions per flow sheet       []  Discharge due to:_  []  Other:_      Ismael Newell 8/7/2019

## 2019-08-15 ENCOUNTER — OFFICE VISIT (OUTPATIENT)
Dept: INTERNAL MEDICINE CLINIC | Age: 59
End: 2019-08-15

## 2019-08-15 VITALS
DIASTOLIC BLOOD PRESSURE: 80 MMHG | BODY MASS INDEX: 27.77 KG/M2 | OXYGEN SATURATION: 100 % | SYSTOLIC BLOOD PRESSURE: 140 MMHG | RESPIRATION RATE: 16 BRPM | TEMPERATURE: 97.8 F | HEIGHT: 70 IN | HEART RATE: 80 BPM | WEIGHT: 194 LBS

## 2019-08-15 DIAGNOSIS — M50.30 DEGENERATIVE DISC DISEASE, CERVICAL: ICD-10-CM

## 2019-08-15 DIAGNOSIS — M54.16 LUMBAR RADICULOPATHY, ACUTE: Primary | ICD-10-CM

## 2019-08-15 DIAGNOSIS — I10 ESSENTIAL HYPERTENSION: ICD-10-CM

## 2019-08-15 RX ORDER — CLONIDINE HYDROCHLORIDE 0.1 MG/1
TABLET ORAL
Qty: 30 TAB | Refills: 3
Start: 2019-08-15 | End: 2020-01-02

## 2019-08-15 NOTE — PATIENT INSTRUCTIONS
[employer] Activation    Thank you for requesting access to [employer]. Please follow the instructions below to securely access and download your online medical record. [employer] allows you to send messages to your doctor, view your test results, renew your prescriptions, schedule appointments, and more. How Do I Sign Up? 1. In your internet browser, go to [email or web address]  2. Click on the First Time User? Click Here link in the Sign In box. You will be redirect to the New Member Sign Up page. 3. Enter your [employer] Access Code exactly as it appears below. You will not need to use this code after youve completed the sign-up process. If you do not sign up before the expiration date, you must request a new code. [employer] Access Code: [email or web address]  Expires: 2019  1:09 PM (This is the date your [employer] access code will )    4. Enter the last four digits of your Social Security Number (xxxx) and Date of Birth (mm/dd/yyyy) as indicated and click Submit. You will be taken to the next sign-up page. 5. Create a [employer] ID. This will be your [employer] login ID and cannot be changed, so think of one that is secure and easy to remember. 6. Create a [employer] password. You can change your password at any time. 7. Enter your Password Reset Question and Answer. This can be used at a later time if you forget your password. 8. Enter your e-mail address. You will receive e-mail notification when new information is available in [address]. 9. Click Sign Up. You can now view and download portions of your medical record. 10. Click the Download Summary menu link to download a portable copy of your medical information. Additional Information    If you have questions, please visit the Frequently Asked Questions section of the [employer] website at [email or web address] Remember, [employer] is NOT to be used for urgent needs. For medical emergencies, dial 911.

## 2019-08-15 NOTE — PROGRESS NOTES
Sherryle Los is a 61 y.o. female and presents with Back Pain; Leg Pain; Physical Therapy; and Malaise  . Subjective:  Hypertension Review:  The patient has essential hypertension  Diet and Lifestyle: generally follows a  low sodium diet, exercises sporadically  Home BP Monitoring: is not measured at home. Pertinent ROS: taking medications as instructed, no medication side effects noted, no TIA's, no chest pain on exertion, no dyspnea on exertion, no swelling of ankles. He has been on clonidine. Back Pain Review:  Patient presents for evaluation of low back problems. Symptoms have been present for weeks and include pain in lower back (dull, mild in character;7 /10 in severity). Initial inciting a fall at [facility]. Symptoms are worst: at times. Alleviating factors identifiable by patient are lying flat, medication . Exacerbating factors identifiable by patient are bending forwards, bending backwards. Treatments so far initiated by patient: medication Previous lower back problems: reported. Previous workup: back surgery in the past.she has been undergoing physical therapy    GERD Review:   Patient has a history of gastroesophageal reflux with heartburn. Symptoms have been present for a few months. She denies dysphagia. She  has not lost weight. She denies melena, hematochezia, hematemesis, and coffee ground emesis. This has been associated with fullness after meals. She denies abdominal bloating and none. Medical therapy in the past has included proton pump inhibitor        Review of Systems  Constitutional: negative for fevers, chills, anorexia and weight loss  Eyes:   negative for visual disturbance and irritation  ENT:   negative for tinnitus,sore throat,nasal congestion,ear pains. hoarseness  Respiratory:  negative for cough, hemoptysis, dyspnea,wheezing  CV:   negative for chest pain, palpitations, lower extremity edema  GI:   negative for nausea, vomiting, diarrhea, abdominal pain,melena  Endo:               negative for polyuria,polydipsia,polyphagia,heat intolerance  Genitourinary: negative for frequency, dysuria and hematuria  Integument:  negative for rash and pruritus  Hematologic:  negative for easy bruising and gum/nose bleeding  Musculoskel:  back pain,   Neurological:  negative for headaches, dizziness, vertigo, memory problems and gait   Behavl/Psych: negative for feelings of anxiety, depression, mood changes    Past Medical History:   Diagnosis Date    Anemia, unspecified 2/21/2011    Arthritis, Degenerative,Knee 2/21/2011    Degenerative Disc Disease 2/21/2011    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 No pertinent surgical history. Social History     Socioeconomic History    Marital status: UNKNOWN     Spouse name: Not on file    Number of children: Not on file    Years of education: Not on file    Highest education level: Not on file   Tobacco Use    Smoking status: Former Smoker    Smokeless tobacco: Never Used   Substance and Sexual Activity    Alcohol use: Yes     Alcohol/week: 0.8 standard drinks     Types: 1 Glasses of wine per week     Comment: SOCIALLY    Drug use: No    Sexual activity: Never     Family History   Problem Relation Age of Onset    Diabetes Mother     Hypertension Mother     Hypertension Father     Cancer Father      Current Outpatient Medications   Medication Sig Dispense Refill    cloNIDine HCl (CATAPRES) 0.1 mg tablet TAKE ONE TABLET BY MOUTH hs 30 Tab 3    baclofen (LIORESAL) 10 mg tablet Take 1 Tab by mouth three (3) times daily. 90 Tab 1    latanoprost (XALATAN) 0.005 % ophthalmic solution Administer 1 Drop to both eyes nightly.  cholecalciferol (VITAMIN D3) 1,000 unit cap Take 2,000 Units by mouth daily.       losartan (COZAAR) 100 mg tablet Take 100 mg by mouth daily.  cyclobenzaprine (FLEXERIL) 10 mg tablet Take 1 Tab by mouth three (3) times daily as needed for Muscle Spasm(s). 20 Tab 0    famotidine (PEPCID) 20 mg tablet Take 20 mg by mouth two (2) times a day.  fluticasone propionate (FLONASE) 50 mcg/actuation nasal spray 2 Sprays by Both Nostrils route daily. 1 Bottle 12    bumetanide (BUMEX) 1 mg tablet TAKE ONE TABLET BY MOUTH ONCE DAILY 30 Tab 12    potassium chloride (KLOR-CON) 10 mEq tablet Take 2 Tabs by mouth daily. 60 Tab 5    amLODIPine (NORVASC) 10 mg tablet Take 1 Tab by mouth daily. 30 Tab 3    naproxen (NAPROSYN) 500 mg tablet TAKE ONE TABLET BY MOUTH TWICE DAILY WITH  A  MEAL 60 Tab 6    cyclobenzaprine (FLEXERIL) 10 mg tablet TAKE ONE TABLET BY MOUTH TWICE DAILY (Patient taking differently: TAKE ONE TABLET BY MOUTH TWICE DAILY as needed) 60 Tab 12    hydroxychloroquine (PLAQUENIL) 200 mg tablet Take 400 mg by mouth daily. No Known Allergies    Objective:  Visit Vitals  /80   Pulse 80   Temp 97.8 °F (36.6 °C) (Oral)   Resp 16   Ht 5' 10\" (1.778 m)   Wt 194 lb (88 kg)   SpO2 100%   BMI 27.84 kg/m²     Physical Exam:   General appearance - alert, well appearing, and in no distress  Mental status - alert, oriented to person, place, and time  EYE-KASEY, EOMI, corneas normal, no foreign bodies  ENT-ENT exam normal, no neck nodes or sinus tenderness  Nose - normal and patent, no erythema, discharge or polyps  Mouth - mucous membranes moist, pharynx normal without lesions  Neck - supple, no significant adenopathy   Chest - clear to auscultation, no wheezes, rales or rhonchi, symmetric air entry   Heart - normal rate, regular rhythm, normal S1, S2, no murmurs, rubs, clicks or gallops   Abdomen - soft, nontender, nondistended, no masses or organomegaly  Lymph- no adenopathy palpable  Ext-peripheral pulses normal, no pedal edema, no clubbing or cyanosis  Skin-Warm and dry.  no hyperpigmentation, vitiligo, or suspicious lesions  Neuro -alert, oriented, normal speech, no focal findings or movement disorder noted  Neck-normal C-spine, no tenderness, full ROM without pain  Feet-no nail deformities or callus formation with good pulses noted      Results for orders placed or performed in visit on 19   AMB POC LIPID PROFILE   Result Value Ref Range    Cholesterol (POC) 158     Triglycerides (POC) 137     HDL Cholesterol (POC) 34     Non-HDL Cholesterol 124     LDL Cholesterol (POC) 97 MG/DL    TChol/HDL Ratio (POC) 4.6        Assessment/Plan:    ICD-10-CM ICD-9-CM    1. Lumbar radiculopathy, acute M54.16 724.4    2. Essential hypertension I10 401.9    3. Degenerative disc disease, cervical M50.30 722.4      Orders Placed This Encounter    cloNIDine HCl (CATAPRES) 0.1 mg tablet     Sig: TAKE ONE TABLET BY MOUTH hs     Dispense:  30 Tab     Refill:  3     Please consider 90 day supplies to promote better adherence     lose weight, follow low fat diet, follow low salt diet, continue present plan  Patient Instructions   AppleTreeBook Activation    Thank you for requesting access to AppleTreeBook. Please follow the instructions below to securely access and download your online medical record. AppleTreeBook allows you to send messages to your doctor, view your test results, renew your prescriptions, schedule appointments, and more. How Do I Sign Up? 1. In your internet browser, go to [email or web address]  2. Click on the First Time User? Click Here link in the Sign In box. You will be redirect to the New Member Sign Up page. 3. Enter your AppleTreeBook Access Code exactly as it appears below. You will not need to use this code after youve completed the sign-up process. If you do not sign up before the expiration date, you must request a new code. AppleTreeBook Access Code: [email or web address]  Expires: 2019  1:09 PM (This is the date your AppleTreeBook access code will )    4.  Enter the last four digits of your Social Security Number (xxxx) and Date of Birth (mm/dd/yyyy) as indicated and click Submit. You will be taken to the next sign-up page. 5. Create a Effcon MXR ID. This will be your Effcon MXR login ID and cannot be changed, so think of one that is secure and easy to remember. 6. Create a Effcon MXR password. You can change your password at any time. 7. Enter your Password Reset Question and Answer. This can be used at a later time if you forget your password. 8. Enter your e-mail address. You will receive e-mail notification when new information is available in [address] 9. Click Sign Up. You can now view and download portions of your medical record. 10. Click the Download Summary menu link to download a portable copy of your medical information. Additional Information    If you have questions, please visit the Frequently Asked Questions section of the Effcon MXR website at [email or web address] Remember, Effcon MXR is NOT to be used for urgent needs. For medical emergencies, dial 911. Follow-up and Dispositions    · Return in about 3 months (around 11/15/2019), or if symptoms worsen or fail to improve. I have reviewed with the patient details of the assessment and plan and all questions were answered. Relevent patient education was performed    An After Visit Summary was printed and given to the patient.

## 2019-08-15 NOTE — PROGRESS NOTES
1. Have you been to the ER, urgent care clinic since your last visit? Hospitalized since your last visit?no    2. Have you seen or consulted any other health care providers outside of the [address] [place] [address] since your last visit? Include any pap smears or colon screening.  No    3 most recent PHQ Screens 1/15/2019   PHQ Not Done -   Little interest or pleasure in doing things Not at all   Feeling down, depressed, irritable, or hopeless Not at all   Total Score PHQ 2 0     Chief Complaint   Patient presents with    Back Pain    Leg Pain    Physical Therapy

## 2019-08-22 ENCOUNTER — HOSPITAL ENCOUNTER (OUTPATIENT)
Dept: PHYSICAL THERAPY | Age: 59
Discharge: HOME | End: 2019-08-22
Payer: MEDICARE

## 2019-08-22 PROCEDURE — 97110 THERAPEUTIC EXERCISES: CPT

## 2019-08-22 PROCEDURE — 97112 NEUROMUSCULAR REEDUCATION: CPT

## 2019-08-22 NOTE — PROGRESS NOTES
PT DAILY TREATMENT NOTE - [facility] 2-15    Patient Name: Elvira Minaya  Date:2019  : 1960  [x]  Patient  Verified  Payor: Calli Lyle / Plan: [place] & [place] / Product Type: Medicare /    In time: 12:08 pm  Out time: 1:10 pm   Total Treatment Time (min): 62 minutes  Total Timed Codes (min): 62 minutes  1:1 Treatment Time ( only): 32 minutes   Visit #: 10    Treatment Area: Right ankle pain [M25.571]  Low back pain [M54.5]     SUBJECTIVE  Pain Level (0-10 scale): 5/10  Any medication changes, allergies to medications, adverse drug reactions, diagnosis change, or new procedure performed?: [x] No    [] Yes (see summary sheet for update)  Subjective functional status/changes:   [] No changes reported  Pt reports she missed a step yesterday and feels like she slightly pulled her hamstring. Her ankle and low back have \"not been too bad. \"  She has not been compliant with her exercises over the past week. OBJECTIVE    47 min Therapeutic Exercise:  [] See flow sheet :   Rationale: increase ROM and increase strength to improve the patients ability to perform ADLs with decreased pain or discomfort. 15 min Neuromuscular Re-education:  []  See flow sheet : MIL hoyt   Rationale: increase strength, improve balance and increase proprioception  to improve the patients ability to ambulate with decreased pain and increased stability. With   [x] TE   [] TA   [] neuro   [] other: Patient Education: [x] Review HEP    [] Progressed/Changed HEP based on:   [] positioning   [] body mechanics   [] transfers   [] heat/ice application    [] other:      Other Objective/Functional Measures: --    Pain Level (0-10 scale) post treatment: 5/10    ASSESSMENT/Changes in Function:   Challenged Pt ankle and lower leg stability with BOSU step-throughs; Pt able to perform without pain, though required fingertip support bilaterally for balance.  Scheduling one additional session next week before transitioning to independent performance of HEP. Patient will continue to benefit from skilled PT services to modify and progress therapeutic interventions, address functional mobility deficits, address ROM deficits, address strength deficits, analyze and address soft tissue restrictions, analyze and cue movement patterns, analyze and modify body mechanics/ergonomics, assess and modify postural abnormalities, address imbalance/dizziness and instruct in home and community integration to attain remaining goals. []  See Plan of Care  [x]  See progress note/recertification  []  See Discharge Summary         Progress towards goals / Updated goals:     Short and Long Term Goals: To be accomplished in 12 treatments:               1. Pt will be independent and compliant with HEP. - Progressing               2. Pt will improve FOTO score by the MCID from 36/100 to 61/100 demonstrating improved overall function with decreased pain or discomfort. - Progressing               3. Pt will be able to pick items up off the floor without complaints of increased back pain. - Progressing (mild discomfort coming up)               4. Pt will be able to tolerate standing >/= 20 minutes without complaints of increased back pain in order to perform functional activities with increased efficiency. - MET (able to wash dishes)               5. Pt will demonstrate and report improved sitting and standing postural awareness.  - MET               6. Pt will be able to perform sit-to-stand transfers without UE support or complaints of increased back pain. - Progressing               7. Pt will be able to ambulate without complaints of right ankle pain.  - Progressing     PLAN  [x]  Upgrade activities as tolerated     [x]  Continue plan of care  []  Update interventions per flow sheet       []  Discharge due to:_  []  Other:_      Cole Browning 8/22/2019

## 2019-08-28 ENCOUNTER — HOSPITAL ENCOUNTER (OUTPATIENT)
Dept: PHYSICAL THERAPY | Age: 59
Discharge: HOME | End: 2019-08-28
Payer: MEDICARE

## 2019-08-28 PROCEDURE — 97110 THERAPEUTIC EXERCISES: CPT

## 2019-08-28 NOTE — PROGRESS NOTES
PT DAILY TREATMENT NOTE - [facility] 2-15    Patient Name: Yobany Laguerre  Date:2019  : 1960  [x]  Patient  Verified  Payor: [employer] / Plan: [place] MEDICARE PART A & B / Product Type: Medicare /    In time: 1:08 pm  Out time: 1:58 pm   Total Treatment Time (min): 50 minutes  Total Timed Codes (min): 50 minutes  1:1 Treatment Time ( only): 26 minutes   Visit #: 11    Treatment Area: Right ankle pain [M25.571]  Low back pain [M54.5]     SUBJECTIVE  Pain Level (0-10 scale): 4/10  Any medication changes, allergies to medications, adverse drug reactions, diagnosis change, or new procedure performed?: [x] No    [] Yes (see summary sheet for update)  Subjective functional status/changes:   [] No changes reported  Pt reports she was having some ankle pain a couple days ago which was eased with her stretches. Her low back is \"pretty good. \"    OBJECTIVE    50 min Therapeutic Exercise:  [] See flow sheet :   Rationale: increase ROM and increase strength to improve the patients ability to perform ADLs with decreased pain or discomfort. With   [x] TE   [] TA   [] neuro   [] other: Patient Education: [x] Review HEP    [] Progressed/Changed HEP based on:   [] positioning   [] body mechanics   [] transfers   [] heat/ice application    [] other:      Other Objective/Functional Measures:   0-100: 85% improved  FOTO Score: 56/100 (36/100)    Pain Level (0-10 scale) post treatment: 0/10    ASSESSMENT/Changes in Function:     []  See Plan of Care  []  See progress note/recertification  [x]  See Discharge Summary         Progress towards goals / Updated goals:     Short and Long Term Goals:  To be accomplished in 12 treatments:               1. Pt will be independent and compliant with HEP. - MET               2. Pt will improve FOTO score by the MCID from 36/100 to 61/100 demonstrating improved overall function with decreased pain or discomfort. - NOT MET (56/100)               3. Pt will be able to pick items up off the floor without complaints of increased back pain.  - NOT MET (mild discomfort, \"much improved\")               4. Pt will be able to tolerate standing >/= 20 minutes without complaints of increased back pain in order to perform functional activities with increased efficiency. - MET (able to wash dishes)               5. Pt will demonstrate and report improved sitting and standing postural awareness.  - MET               6. Pt will be able to perform sit-to-stand transfers without UE support or complaints of increased back pain. - MET               7. Pt will be able to ambulate without complaints of right ankle pain. - MET (most days)     PLAN  []  Upgrade activities as tolerated     []  Continue plan of care  []  Update interventions per flow sheet       [x]  Discharge due to: transition to independent performance of HEP.   []  Other:_      Crystal Place 8/28/2019

## 2019-08-28 NOTE — ANCILLARY DISCHARGE INSTRUCTIONS
[facility]  [address] ([place] [place]), [address] [facility] Jonathan Tellez  Phone: [phone] Fax: [phone]    Discharge Summary 2-15    Patient name: Duong Louise  : 1960  Provider#: [id number]  Referral source: Kendra Plasencia., MD      Medical/Treatment Diagnosis: Right ankle pain [M25.571]  Low back pain [M54.5]     Prior Hospitalization: see medical history     Comorbidities: See Plan of Care  Prior Level of Function: See Plan of Care  Medications: Verified on Patient Summary List    Start of Care: 19      Onset Date:Early 2019   Visits from Start of Care: 11     Missed Visits: 0  Reporting Period : 19 to 19    Assessment/Summary of care: Ms. Lea Braden was seen for a total of 11 skilled physical therapy visits secondary to low back pain and a right ankle sprain. Pt reports feeling 85% improved since beginning treatment and increased her FOTO Score from 36 to 56/100 demonstrating improved overall function with decreased pain. Pt is not able to stand for longer periods of time before onset of any back pain, enabling her to wash dishes and perform functional activities with less discomfort and more efficiency. She is also now able to walk without any complaints of right ankle pain the majority of the time. Pt has been provided a comprehensive HEP in order to further progress independently. Thank you for this referral!    [address] [place] [address] be accomplished in 12 treatments:               1.  Pt will be independent and compliant with HEP. - MET               2. Pt will improve FOTO score by the MCID from 36/100 to 61/100 demonstrating improved overall function with decreased pain or discomfort. - NOT MET (56/100)               3. Pt will be able to pick items up off the floor without complaints of increased back pain.  - NOT MET (mild discomfort, \"much improved\")               4. Pt will be able to tolerate standing >/= 20 minutes without complaints of increased back pain in order to perform functional activities with increased efficiency. - MET (able to wash dishes)               5. Pt will demonstrate and report improved sitting and standing postural awareness.  - MET               6. Pt will be able to perform sit-to-stand transfers without UE support or complaints of increased back pain. - MET               7. Pt will be able to ambulate without complaints of right ankle pain.  - MET (most days)       RECOMMENDATIONS:  [x]Discontinue therapy: [x]Patient has reached or is progressing toward set goals     []Patient is non-compliant or has abdicated     []Due to lack of appreciable progress towards set goals     []Other  Ellouise Breath 8/28/2019

## 2019-09-18 ENCOUNTER — OFFICE VISIT (OUTPATIENT)
Dept: INTERNAL MEDICINE CLINIC | Age: 59
End: 2019-09-18

## 2019-09-18 VITALS
HEIGHT: 70 IN | TEMPERATURE: 98.7 F | OXYGEN SATURATION: 99 % | RESPIRATION RATE: 16 BRPM | HEART RATE: 89 BPM | BODY MASS INDEX: 27.26 KG/M2 | DIASTOLIC BLOOD PRESSURE: 68 MMHG | WEIGHT: 190.4 LBS | SYSTOLIC BLOOD PRESSURE: 110 MMHG

## 2019-09-18 DIAGNOSIS — M54.16 LUMBAR RADICULOPATHY, ACUTE: Primary | ICD-10-CM

## 2019-09-18 DIAGNOSIS — I10 ESSENTIAL HYPERTENSION: ICD-10-CM

## 2019-09-18 NOTE — PROGRESS NOTES
Chief Complaint   Patient presents with    Hypertension    Back Pain     1. Have you been to the ER, urgent care clinic since your last visit? Hospitalized since your last visit? No    2. Have you seen or consulted any other health care providers outside of the [address] [place] [address] since your last visit? Include any pap smears or colon screening.  No\

## 2019-09-18 NOTE — PATIENT INSTRUCTIONS
Ecrebo Activation    Thank you for requesting access to Ecrebo. Please follow the instructions below to securely access and download your online medical record. Ecrebo allows you to send messages to your doctor, view your test results, renew your prescriptions, schedule appointments, and more. How Do I Sign Up? 1. In your internet browser, go to [email or web address]  2. Click on the First Time User? Click Here link in the Sign In box. You will be redirect to the New Member Sign Up page. 3. Enter your Ecrebo Access Code exactly as it appears below. You will not need to use this code after youve completed the sign-up process. If you do not sign up before the expiration date, you must request a new code. Ecrebo Access Code: [email or web address]  Expires: 10/28/2019  1:16 PM (This is the date your Ecrebo access code will )    4. Enter the last four digits of your Social Security Number (xxxx) and Date of Birth (mm/dd/yyyy) as indicated and click Submit. You will be taken to the next sign-up page. 5. Create a Ecrebo ID. This will be your Ecrebo login ID and cannot be changed, so think of one that is secure and easy to remember. 6. Create a Ecrebo password. You can change your password at any time. 7. Enter your Password Reset Question and Answer. This can be used at a later time if you forget your password. 8. Enter your e-mail address. You will receive e-mail notification when new information is available in [address] 9. Click Sign Up. You can now view and download portions of your medical record. 10. Click the Download Summary menu link to download a portable copy of your medical information. Additional Information    If you have questions, please visit the Frequently Asked Questions section of the Ecrebo website at [email or web address] Remember, Ecrebo is NOT to be used for urgent needs. For medical emergencies, dial 911.

## 2019-09-18 NOTE — PROGRESS NOTES
Ana Leone is a 61 y.o. female and presents with Hypertension and Back Pain  . Subjective:  Hypertension Review:  The patient has essential hypertension  Diet and Lifestyle: generally follows a  low sodium diet, exercises sporadically  Home BP Monitoring: is not measured at home. Pertinent ROS: taking medications as instructed, no medication side effects noted, no TIA's, no chest pain on exertion, no dyspnea on exertion, no swelling of ankles. she has been on clonidine. Back Pain Review:  Patient presents for evaluation of low back problems. Symptoms have been present for months and include pain in lower back (dull, mild in character5/10 in severity). Initial inciting a fall at [facility]. Symptoms are worst: at times. Alleviating factors identifiable by patient are lying flat, medication . Exacerbating factors identifiable by patient are bending forwards, bending backwards. Treatments so far initiated by patient: medication Previous lower back problems: reported. Previous workup: back surgery in the past.she has completed  physical therapy    GERD Review:   Patient has a history of gastroesophageal reflux with heartburn. Symptoms have been present for a few months. She denies dysphagia. She  has not lost weight. She denies melena, hematochezia, hematemesis, and coffee ground emesis. This has been associated with fullness after meals. She denies abdominal bloating and none. Medical therapy in the past has included proton pump inhibitor        Review of Systems  Constitutional: negative for fevers, chills, anorexia and weight loss  Eyes:   negative for visual disturbance and irritation  ENT:   negative for tinnitus,sore throat,nasal congestion,ear pains. hoarseness  Respiratory:  negative for cough, hemoptysis, dyspnea,wheezing  CV:   negative for chest pain, palpitations, lower extremity edema  GI:   negative for nausea, vomiting, diarrhea, abdominal pain,melena  Endo:               negative for polyuria,polydipsia,polyphagia,heat intolerance  Genitourinary: negative for frequency, dysuria and hematuria  Integument:  negative for rash and pruritus  Hematologic:  negative for easy bruising and gum/nose bleeding  Musculoskel:  back pain,   Neurological:  negative for headaches, dizziness, vertigo, memory problems and gait   Behavl/Psych: negative for feelings of anxiety, depression, mood changes    Past Medical History:   Diagnosis Date    Anemia, unspecified 2/21/2011    Arthritis, Degenerative,Knee 2/21/2011    Degenerative Disc Disease 2/21/2011    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 Social History     Socioeconomic History    Marital status: UNKNOWN     Spouse name: Not on file    Number of children: Not on file    Years of education: Not on file    Highest education level: Not on file   Tobacco Use    Smoking status: Former Smoker    Smokeless tobacco: Never Used   Substance and Sexual Activity    Alcohol use: Yes     Alcohol/week: 0.8 standard drinks     Types: 1 Glasses of wine per week     Comment: SOCIALLY    Drug use: No    Sexual activity: Never     Family History   Problem Relation Age of Onset    Diabetes Mother     Hypertension Mother     Hypertension Father     Cancer Father      Current Outpatient Medications   Medication Sig Dispense Refill    amLODIPine (NORVASC) 10 mg tablet TAKE 1 TABLET BY MOUTH ONCE DAILY 30 Tab 3    cloNIDine HCl (CATAPRES) 0.1 mg tablet TAKE ONE TABLET BY MOUTH hs 30 Tab 3    baclofen (LIORESAL) 10 mg tablet Take 1 Tab by mouth three (3) times daily. 90 Tab 1    latanoprost (XALATAN) 0.005 % ophthalmic solution Administer 1 Drop to both eyes nightly.       cholecalciferol (VITAMIN D3) 1,000 unit cap Take 2,000 Units by mouth daily.  losartan (COZAAR) 100 mg tablet Take 100 mg by mouth daily.  famotidine (PEPCID) 20 mg tablet Take 20 mg by mouth two (2) times a day.  fluticasone propionate (FLONASE) 50 mcg/actuation nasal spray 2 Sprays by Both Nostrils route daily. 1 Bottle 12    bumetanide (BUMEX) 1 mg tablet TAKE ONE TABLET BY MOUTH ONCE DAILY 30 Tab 12    potassium chloride (KLOR-CON) 10 mEq tablet Take 2 Tabs by mouth daily. 60 Tab 5    naproxen (NAPROSYN) 500 mg tablet TAKE ONE TABLET BY MOUTH TWICE DAILY WITH  A  MEAL 60 Tab 6    cyclobenzaprine (FLEXERIL) 10 mg tablet TAKE ONE TABLET BY MOUTH TWICE DAILY (Patient taking differently: TAKE ONE TABLET BY MOUTH TWICE DAILY as needed) 60 Tab 12    hydroxychloroquine (PLAQUENIL) 200 mg tablet Take 400 mg by mouth daily.  cyclobenzaprine (FLEXERIL) 10 mg tablet Take 1 Tab by mouth three (3) times daily as needed for Muscle Spasm(s). 20 Tab 0     No Known Allergies    Objective:  Visit Vitals  /68 (BP 1 Location: Right arm, BP Patient Position: Sitting)   Pulse 89   Temp 98.7 °F (37.1 °C) (Oral)   Resp 16   Ht 5' 10\" (1.778 m)   Wt 190 lb 6.4 oz (86.4 kg)   SpO2 99%   BMI 27.32 kg/m²     Physical Exam:   General appearance - alert, well appearing, and in no distress  Mental status - alert, oriented to person, place, and time  EYE-KASEY, EOMI, corneas normal, no foreign bodies  ENT-ENT exam normal, no neck nodes or sinus tenderness  Nose - normal and patent, no erythema, discharge or polyps  Mouth - mucous membranes moist, pharynx normal without lesions  Neck - supple, no significant adenopathy   Chest - clear to auscultation, no wheezes, rales or rhonchi, symmetric air entry   Heart - normal rate, regular rhythm, normal S1, S2, no murmurs, rubs, clicks or gallops   Abdomen - soft, nontender, nondistended, no masses or organomegaly  Lymph- no adenopathy palpable  Ext-peripheral pulses normal, no pedal edema, no clubbing or cyanosis  Skin-Warm and dry. no hyperpigmentation, vitiligo, or suspicious lesions  Neuro -alert, oriented, normal speech, no focal findings or movement disorder noted  Neck-normal C-spine, no tenderness, full ROM without pain  Feet-no nail deformities or callus formation with good pulses noted      Results for orders placed or performed in visit on 19   AMB POC LIPID PROFILE   Result Value Ref Range    Cholesterol (POC) 158     Triglycerides (POC) 137     HDL Cholesterol (POC) 34     Non-HDL Cholesterol 124     LDL Cholesterol (POC) 97 MG/DL    TChol/HDL Ratio (POC) 4.6        Assessment/Plan:    ICD-10-CM ICD-9-CM    1. Lumbar radiculopathy, acute M54.16 724.4    2. Essential hypertension I10 401.9      No orders of the defined types were placed in this encounter. lose weight, follow low fat diet, follow low salt diet, continue present plan  Patient Instructions   MyChart Activation    Thank you for requesting access to [employer] (HotelHotel). Please follow the instructions below to securely access and download your online medical record. [employer] (HotelHotel) allows you to send messages to your doctor, view your test results, renew your prescriptions, schedule appointments, and more. How Do I Sign Up? 1. In your internet browser, go to [email or web address]  2. Click on the First Time User? Click Here link in the Sign In box. You will be redirect to the New Member Sign Up page. 3. Enter your [employer] (HotelHotel) Access Code exactly as it appears below. You will not need to use this code after youve completed the sign-up process. If you do not sign up before the expiration date, you must request a new code. [employer] (HotelHotel) Access Code: [email or web address]  Expires: 10/28/2019  1:16 PM (This is the date your [employer] (HotelHotel) access code will )    4. Enter the last four digits of your Social Security Number (xxxx) and Date of Birth (mm/dd/yyyy) as indicated and click Submit. You will be taken to the next sign-up page. 5. Create a [employer]rement (HotelHotel) ID.  This will be your [employer] (HotelHotel) login ID and cannot be changed, so think of one that is secure and easy to remember. 6. Create a eToro password. You can change your password at any time. 7. Enter your Password Reset Question and Answer. This can be used at a later time if you forget your password. 8. Enter your e-mail address. You will receive e-mail notification when new information is available in [address] 9. Click Sign Up. You can now view and download portions of your medical record. 10. Click the Download Summary menu link to download a portable copy of your medical information. Additional Information    If you have questions, please visit the Frequently Asked Questions section of the eToro website at [email or web address] Remember, eToro is NOT to be used for urgent needs. For medical emergencies, dial 911. Follow-up and Dispositions    · Return in about 3 months (around 12/18/2019), or if symptoms worsen or fail to improve. I have reviewed with the patient details of the assessment and plan and all questions were answered. Relevent patient education was performed    An After Visit Summary was printed and given to the patient.

## 2020-01-02 ENCOUNTER — OFFICE VISIT (OUTPATIENT)
Dept: INTERNAL MEDICINE CLINIC | Age: 60
End: 2020-01-02

## 2020-01-02 VITALS
WEIGHT: 198 LBS | HEART RATE: 88 BPM | TEMPERATURE: 98.2 F | HEIGHT: 70 IN | DIASTOLIC BLOOD PRESSURE: 94 MMHG | OXYGEN SATURATION: 98 % | RESPIRATION RATE: 16 BRPM | SYSTOLIC BLOOD PRESSURE: 148 MMHG | BODY MASS INDEX: 28.35 KG/M2

## 2020-01-02 DIAGNOSIS — K21.9 GASTROESOPHAGEAL REFLUX DISEASE WITHOUT ESOPHAGITIS: ICD-10-CM

## 2020-01-02 DIAGNOSIS — M50.30 DEGENERATIVE DISC DISEASE, CERVICAL: ICD-10-CM

## 2020-01-02 DIAGNOSIS — M54.16 LUMBAR RADICULOPATHY, ACUTE: ICD-10-CM

## 2020-01-02 DIAGNOSIS — I10 ESSENTIAL HYPERTENSION: Primary | ICD-10-CM

## 2020-01-02 LAB
CHOLEST SERPL-MCNC: 176 MG/DL
HDLC SERPL-MCNC: 38 MG/DL
LDL CHOLESTEROL POC: 94 MG/DL
NON-HDL CHOLESTEROL, 011976: NORMAL
TCHOL/HDL RATIO (POC): 4.7
TRIGL SERPL-MCNC: 221 MG/DL

## 2020-01-02 RX ORDER — CLONIDINE HYDROCHLORIDE 0.2 MG/1
TABLET ORAL
Qty: 30 TAB | Refills: 3 | Status: SHIPPED | OUTPATIENT
Start: 2020-01-02 | End: 2020-11-05

## 2020-01-02 NOTE — PROGRESS NOTES
1. Have you been to the ER, urgent care clinic since your last visit? Hospitalized since your last visit?no    2. Have you seen or consulted any other health care providers outside of the [address] [place] [address] since your last visit? Include any pap smears or colon screening. No    3 most recent PHQ Screens 1/15/2019   PHQ Not Done -   Little interest or pleasure in doing things Not at all   Feeling down, depressed, irritable, or hopeless Not at all   Total Score PHQ 2 0     Per Dr. Talia Kang.,  verbal order given for needed amb poc labs.     Chief Complaint   Patient presents with    Hypertension

## 2020-01-02 NOTE — PROGRESS NOTES
Too Ott is a 61 y.o. female and presents with Hypertension  . Subjective:  Hypertension Review:  The patient has essential hypertension  Diet and Lifestyle: generally follows a  low sodium diet, exercises sporadically  Home BP Monitoring: is not measured at home. Pertinent ROS: taking medications as instructed, no medication side effects noted, no TIA's, no chest pain on exertion, no dyspnea on exertion, no swelling of ankles. she has been on clonidine. Back Pain Review:  Patient presents for evaluation of low back problems. Symptoms have been present for months and include pain in lower back (dull, mild in character5/10 in severity). Initial inciting a fall at [facility]. Symptoms are worst: at times. Alleviating factors identifiable by patient are lying flat, medication . Exacerbating factors identifiable by patient are bending forwards, bending backwards. Treatments so far initiated by patient: medication Previous lower back problems: reported. Previous workup: back surgery in the past.she has completed  physical therapy    GERD Review:   Patient has a history of gastroesophageal reflux with heartburn. Symptoms have been present for a few months. She denies dysphagia. She  has not lost weight. She denies melena, hematochezia, hematemesis, and coffee ground emesis. This has been associated with fullness after meals. She denies abdominal bloating and none. Medical therapy in the past has included proton pump inhibitor        Review of Systems  Constitutional: negative for fevers, chills, anorexia and weight loss  Eyes:   negative for visual disturbance and irritation  ENT:   negative for tinnitus,sore throat,nasal congestion,ear pains. hoarseness  Respiratory:  negative for cough, hemoptysis, dyspnea,wheezing  CV:   negative for chest pain, palpitations, lower extremity edema  GI:   negative for nausea, vomiting, diarrhea, abdominal pain,melena  Endo:               negative for polyuria,polydipsia,polyphagia,heat intolerance  Genitourinary: negative for frequency, dysuria and hematuria  Integument:  negative for rash and pruritus  Hematologic:  negative for easy bruising and gum/nose bleeding  Musculoskel:  back pain,   Neurological:  negative for headaches, dizziness, vertigo, memory problems and gait   Behavl/Psych: negative for feelings of anxiety, depression, mood changes    Past Medical History:   Diagnosis Date    Anemia, unspecified 2/21/2011    Arthritis, Degenerative,Knee 2/21/2011    Degenerative Disc Disease 2/21/2011    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 No pertinent surgical history. Social History     Socioeconomic History    Marital status: UNKNOWN     Spouse name: Not on file    Number of children: Not on file    Years of education: Not on file    Highest education level: Not on file   Tobacco Use    Smoking status: Former Smoker    Smokeless tobacco: Never Used   Substance and Sexual Activity    Alcohol use: Yes     Alcohol/week: 0.8 standard drinks     Types: 1 Glasses of wine per week     Comment: SOCIALLY    Drug use: No    Sexual activity: Never     Family History   Problem Relation Age of Onset    Diabetes Mother     Hypertension Mother     Hypertension Father     Cancer Father      Current Outpatient Medications   Medication Sig Dispense Refill    cloNIDine HCl (CATAPRES) 0.2 mg tablet TAKE ONE TABLET BY MOUTH hs 30 Tab 3    amLODIPine (NORVASC) 10 mg tablet TAKE 1 TABLET BY MOUTH ONCE DAILY 30 Tab 3    baclofen (LIORESAL) 10 mg tablet Take 1 Tab by mouth three (3) times daily. 90 Tab 1    latanoprost (XALATAN) 0.005 % ophthalmic solution Administer 1 Drop to both eyes nightly.       cholecalciferol (VITAMIN D3) 1,000 unit cap Take 2,000 Units by mouth daily.  losartan (COZAAR) 100 mg tablet Take 100 mg by mouth daily.  cyclobenzaprine (FLEXERIL) 10 mg tablet Take 1 Tab by mouth three (3) times daily as needed for Muscle Spasm(s). 20 Tab 0    famotidine (PEPCID) 20 mg tablet Take 20 mg by mouth two (2) times a day.  fluticasone propionate (FLONASE) 50 mcg/actuation nasal spray 2 Sprays by Both Nostrils route daily. 1 Bottle 12    bumetanide (BUMEX) 1 mg tablet TAKE ONE TABLET BY MOUTH ONCE DAILY 30 Tab 12    potassium chloride (KLOR-CON) 10 mEq tablet Take 2 Tabs by mouth daily. 60 Tab 5    naproxen (NAPROSYN) 500 mg tablet TAKE ONE TABLET BY MOUTH TWICE DAILY WITH  A  MEAL 60 Tab 6    cyclobenzaprine (FLEXERIL) 10 mg tablet TAKE ONE TABLET BY MOUTH TWICE DAILY (Patient taking differently: TAKE ONE TABLET BY MOUTH TWICE DAILY as needed) 60 Tab 12    hydroxychloroquine (PLAQUENIL) 200 mg tablet Take 400 mg by mouth daily. No Known Allergies    Objective:  Visit Vitals  BP (!) 148/94   Pulse 88   Temp 98.2 °F (36.8 °C) (Oral)   Resp 16   Ht 5' 10\" (1.778 m)   Wt 198 lb (89.8 kg)   SpO2 98%   BMI 28.41 kg/m²     Physical Exam:   General appearance - alert, well appearing, and in no distress  Mental status - alert, oriented to person, place, and time  EYE-KASEY, EOMI, corneas normal, no foreign bodies  ENT-ENT exam normal, no neck nodes or sinus tenderness  Nose - normal and patent, no erythema, discharge or polyps  Mouth - mucous membranes moist, pharynx normal without lesions  Neck - supple, no significant adenopathy   Chest - clear to auscultation, no wheezes, rales or rhonchi, symmetric air entry   Heart - normal rate, regular rhythm, normal S1, S2, no murmurs, rubs, clicks or gallops   Abdomen - soft, nontender, nondistended, no masses or organomegaly  Lymph- no adenopathy palpable  Ext-peripheral pulses normal, no pedal edema, no clubbing or cyanosis  Skin-Warm and dry.  no hyperpigmentation, vitiligo, or suspicious lesions  Neuro -alert, oriented, normal speech, no focal findings or movement disorder noted  Neck-normal C-spine, no tenderness, full ROM without pain  Feet-no nail deformities or callus formation with good pulses noted      Results for orders placed or performed in visit on 01/02/20   AMB POC LIPID PROFILE   Result Value Ref Range    Cholesterol (POC) 176     Triglycerides (POC) 221     HDL Cholesterol (POC) 38     Non-HDL Cholesterol      LDL Cholesterol (POC) 94 MG/DL    TChol/HDL Ratio (POC) 4.7        Assessment/Plan:    ICD-10-CM ICD-9-CM    1. Essential hypertension I10 401.9 AMB POC LIPID PROFILE   2. Lumbar radiculopathy, acute M54.16 724.4    3. Degenerative disc disease, cervical M50.30 722.4    4. Gastroesophageal reflux disease without esophagitis K21.9 530.81      Orders Placed This Encounter    AMB POC LIPID PROFILE    cloNIDine HCl (CATAPRES) 0.2 mg tablet     Sig: TAKE ONE TABLET BY MOUTH hs     Dispense:  30 Tab     Refill:  3     Please consider 90 day supplies to promote better adherence     lose weight, follow low fat diet, follow low salt diet, continue present plan  Patient Instructions   [email or web address] Activation    Thank you for requesting access to [email or web address]. Please follow the instructions below to securely access and download your online medical record. [email or web address] allows you to send messages to your doctor, view your test results, renew your prescriptions, schedule appointments, and more. How Do I Sign Up? 1. In your internet browser, go to [email or web address]  2. Click on the First Time User? Click Here link in the Sign In box. You will be redirect to the New Member Sign Up page. 3. Enter your [email or web address] Access Code exactly as it appears below. You will not need to use this code after youve completed the sign-up process. If you do not sign up before the expiration date, you must request a new code.     [email or web address] Access Code: [email or web address]  Expires: 2/16/2020  1:59 PM (This is the date your [employer] access code will )    4. Enter the last four digits of your Social Security Number (xxxx) and Date of Birth (mm/dd/yyyy) as indicated and click Submit. You will be taken to the next sign-up page. 5. Create a OGSystemst ID. This will be your [employer] login ID and cannot be changed, so think of one that is secure and easy to remember. 6. Create a [employer] password. You can change your password at any time. 7. Enter your Password Reset Question and Answer. This can be used at a later time if you forget your password. 8. Enter your e-mail address. You will receive e-mail notification when new information is available in [address] 9. Click Sign Up. You can now view and download portions of your medical record. 10. Click the Download Summary menu link to download a portable copy of your medical information. Additional Information    If you have questions, please visit the Frequently Asked Questions section of the [employer] website at [email or web address] Remember, [employer] is NOT to be used for urgent needs. For medical emergencies, dial 911. Follow-up and Dispositions    · Return in about 4 weeks (around 2020), or if symptoms worsen or fail to improve. I have reviewed with the patient details of the assessment and plan and all questions were answered. Relevent patient education was performed    An After Visit Summary was printed and given to the patient.

## 2020-01-02 NOTE — PATIENT INSTRUCTIONS
Healthcare Interactive Activation    Thank you for requesting access to Healthcare Interactive. Please follow the instructions below to securely access and download your online medical record. Healthcare Interactive allows you to send messages to your doctor, view your test results, renew your prescriptions, schedule appointments, and more. How Do I Sign Up? 1. In your internet browser, go to [email or web address]  2. Click on the First Time User? Click Here link in the Sign In box. You will be redirect to the New Member Sign Up page. 3. Enter your Healthcare Interactive Access Code exactly as it appears below. You will not need to use this code after youve completed the sign-up process. If you do not sign up before the expiration date, you must request a new code. Healthcare Interactive Access Code: [email or web address]  Expires: 2020  1:59 PM (This is the date your Healthcare Interactive access code will )    4. Enter the last four digits of your Social Security Number (xxxx) and Date of Birth (mm/dd/yyyy) as indicated and click Submit. You will be taken to the next sign-up page. 5. Create a Healthcare Interactive ID. This will be your Healthcare Interactive login ID and cannot be changed, so think of one that is secure and easy to remember. 6. Create a Healthcare Interactive password. You can change your password at any time. 7. Enter your Password Reset Question and Answer. This can be used at a later time if you forget your password. 8. Enter your e-mail address. You will receive e-mail notification when new information is available in [address] 9. Click Sign Up. You can now view and download portions of your medical record. 10. Click the Download Summary menu link to download a portable copy of your medical information. Additional Information    If you have questions, please visit the Frequently Asked Questions section of the Healthcare Interactive website at [email or web address] Remember, Healthcare Interactive is NOT to be used for urgent needs. For medical emergencies, dial 911.

## 2020-01-30 ENCOUNTER — OFFICE VISIT (OUTPATIENT)
Dept: INTERNAL MEDICINE CLINIC | Age: 60
End: 2020-01-30

## 2020-01-30 VITALS
DIASTOLIC BLOOD PRESSURE: 94 MMHG | SYSTOLIC BLOOD PRESSURE: 150 MMHG | HEIGHT: 70 IN | WEIGHT: 200 LBS | HEART RATE: 74 BPM | OXYGEN SATURATION: 92 % | RESPIRATION RATE: 16 BRPM | TEMPERATURE: 98.2 F | BODY MASS INDEX: 28.63 KG/M2

## 2020-01-30 DIAGNOSIS — R60.9 PERIPHERAL EDEMA: ICD-10-CM

## 2020-01-30 RX ORDER — BUMETANIDE 1 MG/1
TABLET ORAL
Qty: 30 TAB | Refills: 12 | Status: SHIPPED | OUTPATIENT
Start: 2020-01-30 | End: 2021-06-22 | Stop reason: SDUPTHER

## 2020-01-30 RX ORDER — LOSARTAN POTASSIUM 100 MG/1
100 TABLET ORAL DAILY
Qty: 30 TAB | Refills: 12 | Status: SHIPPED | OUTPATIENT
Start: 2020-01-30 | End: 2021-02-19

## 2020-01-30 RX ORDER — HYDROXYCHLOROQUINE SULFATE 200 MG/1
400 TABLET, FILM COATED ORAL DAILY
Qty: 60 TAB | Refills: 3 | Status: SHIPPED | OUTPATIENT
Start: 2020-01-30 | End: 2021-06-22 | Stop reason: ALTCHOICE

## 2020-01-30 RX ORDER — FAMOTIDINE 20 MG/1
20 TABLET, FILM COATED ORAL 2 TIMES DAILY
Qty: 60 TAB | Refills: 3 | Status: SHIPPED | OUTPATIENT
Start: 2020-01-30 | End: 2021-02-17 | Stop reason: DRUGHIGH

## 2020-01-30 RX ORDER — AMLODIPINE BESYLATE 10 MG/1
TABLET ORAL
Qty: 30 TAB | Refills: 3 | Status: CANCELLED | OUTPATIENT
Start: 2020-01-30

## 2020-01-30 NOTE — PROGRESS NOTES
Clayton Coyne is a 61 y.o. female and presents with Joint Pain (f/u) and Back Pain  . Subjective:  Hypertension Review:  The patient has essential hypertension  Diet and Lifestyle: generally follows a  low sodium diet, exercises sporadically  Home BP Monitoring: is not measured at home. Pertinent ROS: taking medications as instructed, no medication side effects noted, no TIA's, no chest pain on exertion, no dyspnea on exertion, no swelling of ankles. she has been on clonidine. Back Pain Review:  Patient presents for evaluation of low back problems. Symptoms have been present for months and are improved. GERD Review:   Patient has a history of gastroesophageal reflux with heartburn. Symptoms have been present for a few months. She denies dysphagia. She  has not lost weight. She denies melena, hematochezia, hematemesis, and coffee ground emesis. This has been associated with fullness after meals. She denies abdominal bloating and none. Medical therapy in the past has included proton pump inhibitor        Review of Systems  Constitutional: negative for fevers, chills, anorexia and weight loss  Eyes:   negative for visual disturbance and irritation  ENT:   negative for tinnitus,sore throat,nasal congestion,ear pains. hoarseness  Respiratory:  negative for cough, hemoptysis, dyspnea,wheezing  CV:   negative for chest pain, palpitations, lower extremity edema  GI:   negative for nausea, vomiting, diarrhea, abdominal pain,melena  Endo:               negative for polyuria,polydipsia,polyphagia,heat intolerance  Genitourinary: negative for frequency, dysuria and hematuria  Integument:  negative for rash and pruritus  Hematologic:  negative for easy bruising and gum/nose bleeding  Musculoskel:  back pain,   Neurological:  negative for headaches, dizziness, vertigo, memory problems and gait   Behavl/Psych: negative for feelings of anxiety, depression, mood changes    Past Medical History:   Diagnosis Date  Anemia, unspecified 2/21/2011    Arthritis, Degenerative,Knee 2/21/2011    Degenerative Disc Disease 2/21/2011    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 No pertinent surgical history. Social History     Socioeconomic History    Marital status: UNKNOWN     Spouse name: Not on file    Number of children: Not on file    Years of education: Not on file    Highest education level: Not on file   Tobacco Use    Smoking status: Former Smoker    Smokeless tobacco: Never Used   Substance and Sexual Activity    Alcohol use: Yes     Alcohol/week: 0.8 standard drinks     Types: 1 Glasses of wine per week     Comment: SOCIALLY    Drug use: No    Sexual activity: Never     Family History   Problem Relation Age of Onset    Diabetes Mother     Hypertension Mother     Hypertension Father     Cancer Father      Current Outpatient Medications   Medication Sig Dispense Refill    bumetanide (BUMEX) 1 mg tablet TAKE ONE TABLET BY MOUTH ONCE DAILY 30 Tab 12    hydroxychloroquine (PLAQUENIL) 200 mg tablet Take 2 Tabs by mouth daily. 60 Tab 3    losartan (COZAAR) 100 mg tablet Take 1 Tab by mouth daily. 30 Tab 12    famotidine (PEPCID) 20 mg tablet Take 1 Tab by mouth two (2) times a day. 60 Tab 3    cloNIDine HCl (CATAPRES) 0.2 mg tablet TAKE ONE TABLET BY MOUTH hs 30 Tab 3    amLODIPine (NORVASC) 10 mg tablet TAKE 1 TABLET BY MOUTH ONCE DAILY 30 Tab 3    baclofen (LIORESAL) 10 mg tablet Take 1 Tab by mouth three (3) times daily. 90 Tab 1    latanoprost (XALATAN) 0.005 % ophthalmic solution Administer 1 Drop to both eyes nightly.  cholecalciferol (VITAMIN D3) 1,000 unit cap Take 2,000 Units by mouth daily.       cyclobenzaprine (FLEXERIL) 10 mg tablet Take 1 Tab by mouth three (3) times daily as needed for Muscle Spasm(s). 20 Tab 0    fluticasone propionate (FLONASE) 50 mcg/actuation nasal spray 2 Sprays by Both Nostrils route daily. 1 Bottle 12    potassium chloride (KLOR-CON) 10 mEq tablet Take 2 Tabs by mouth daily. 60 Tab 5    naproxen (NAPROSYN) 500 mg tablet TAKE ONE TABLET BY MOUTH TWICE DAILY WITH  A  MEAL 60 Tab 6    cyclobenzaprine (FLEXERIL) 10 mg tablet TAKE ONE TABLET BY MOUTH TWICE DAILY (Patient taking differently: TAKE ONE TABLET BY MOUTH TWICE DAILY as needed) 60 Tab 12     No Known Allergies    Objective:  Visit Vitals  BP (!) 150/94   Pulse 74   Temp 98.2 °F (36.8 °C) (Oral)   Resp 16   Ht 5' 10\" (1.778 m)   Wt 200 lb (90.7 kg)   SpO2 92%   BMI 28.70 kg/m²     Physical Exam:   General appearance - alert, well appearing, and in no distress  Mental status - alert, oriented to person, place, and time  EYE-KASEY, EOMI, corneas normal, no foreign bodies  ENT-ENT exam normal, no neck nodes or sinus tenderness  Nose - normal and patent, no erythema, discharge or polyps  Mouth - mucous membranes moist, pharynx normal without lesions  Neck - supple, no significant adenopathy   Chest - clear to auscultation, no wheezes, rales or rhonchi, symmetric air entry   Heart - normal rate, regular rhythm, normal S1, S2, no murmurs, rubs, clicks or gallops   Abdomen - soft, nontender, nondistended, no masses or organomegaly  Lymph- no adenopathy palpable  Ext-peripheral pulses normal, no pedal edema, no clubbing or cyanosis  Skin-Warm and dry.  no hyperpigmentation, vitiligo, or suspicious lesions  Neuro -alert, oriented, normal speech, no focal findings or movement disorder noted  Neck-normal C-spine, no tenderness, full ROM without pain  Feet-no nail deformities or callus formation with good pulses noted      Results for orders placed or performed in visit on 01/02/20   AMB POC LIPID PROFILE   Result Value Ref Range    Cholesterol (POC) 176     Triglycerides (POC) 221     HDL Cholesterol (POC) 38     Non-HDL Cholesterol      LDL Cholesterol (POC) 94 MG/DL    TChol/HDL Ratio (POC) 4.7        Assessment/Plan:    ICD-10-CM ICD-9-CM    1. Peripheral edema R60.9 782.3 bumetanide (BUMEX) 1 mg tablet     Orders Placed This Encounter    bumetanide (BUMEX) 1 mg tablet     Sig: TAKE ONE TABLET BY MOUTH ONCE DAILY     Dispense:  30 Tab     Refill:  12     Please consider 90 day supplies to promote better adherence    hydroxychloroquine (PLAQUENIL) 200 mg tablet     Sig: Take 2 Tabs by mouth daily. Dispense:  60 Tab     Refill:  3    losartan (COZAAR) 100 mg tablet     Sig: Take 1 Tab by mouth daily. Dispense:  30 Tab     Refill:  12    famotidine (PEPCID) 20 mg tablet     Sig: Take 1 Tab by mouth two (2) times a day. Dispense:  60 Tab     Refill:  3     lose weight, follow low fat diet, follow low salt diet, continue present plan  Patient Instructions   PlayFilmharNew Screens Activation    Thank you for requesting access to [employer]. Please follow the instructions below to securely access and download your online medical record. [employer]ystem allows you to send messages to your doctor, view your test results, renew your prescriptions, schedule appointments, and more. How Do I Sign Up? 1. In your internet browser, go to [email or web address]  2. Click on the First Time User? Click Here link in the Sign In box. You will be redirect to the New Member Sign Up page. 3. Enter your Cod[employer]ystem Access Code exactly as it appears below. You will not need to use this code after youve completed the sign-up process. If you do not sign up before the expiration date, you must request a new code. [employer]ystem Access Code: [email or web address]  Expires: 2020  1:59 PM (This is the date your Codasystem access code will )    4. Enter the last four digits of your Social Security Number (xxxx) and Date of Birth (mm/dd/yyyy) as indicated and click Submit. You will be taken to the next sign-up page. 5. Create a Codasystem ID.  This will be your Codasystem login ID and cannot be changed, so think of one that is secure and easy to remember. 6. Create a CargoSense password. You can change your password at any time. 7. Enter your Password Reset Question and Answer. This can be used at a later time if you forget your password. 8. Enter your e-mail address. You will receive e-mail notification when new information is available in [address] 9. Click Sign Up. You can now view and download portions of your medical record. 10. Click the Download Summary menu link to download a portable copy of your medical information. Additional Information    If you have questions, please visit the Frequently Asked Questions section of the [employer] website at [email or web address] Remember, C[employer] is NOT to be used for urgent needs. For medical emergencies, dial 911. Follow-up and Dispositions    · Return in about 3 months (around 4/30/2020), or if symptoms worsen or fail to improve. I have reviewed with the patient details of the assessment and plan and all questions were answered. Relevent patient education was performed    An After Visit Summary was printed and given to the patient.

## 2020-01-30 NOTE — PATIENT INSTRUCTIONS
[employer] Activation Thank you for requesting access to [employer]. Please follow the instructions below to securely access and download your online medical record. [employer] allows you to send messages to your doctor, view your test results, renew your prescriptions, schedule appointments, and more. How Do I Sign Up? 1. In your internet browser, go to [email or web address] 
2. Click on the First Time User? Click Here link in the Sign In box. You will be redirect to the New Member Sign Up page. 3. Enter your [employer] Access Code exactly as it appears below. You will not need to use this code after youve completed the sign-up process. If you do not sign up before the expiration date, you must request a new code. [employer] Access Code: [email or web address] Expires: 2020  1:59 PM (This is the date your [employer] access code will ) 4. Enter the last four digits of your Social Security Number (xxxx) and Date of Birth (mm/dd/yyyy) as indicated and click Submit. You will be taken to the next sign-up page. 5. Create a [employer] ID. This will be your [employer] login ID and cannot be changed, so think of one that is secure and easy to remember. 6. Create a [employer] password. You can change your password at any time. 7. Enter your Password Reset Question and Answer. This can be used at a later time if you forget your password. 8. Enter your e-mail address. You will receive e-mail notification when new information is available in [address] 9. Click Sign Up. You can now view and download portions of your medical record. 10. Click the Download Summary menu link to download a portable copy of your medical information. Additional Information If you have questions, please visit the Frequently Asked Questions section of the [employer] website at [email or web address] [email or web address] Call/Phase [email or web address]/. Remember, [employer] is NOT to be used for urgent needs. For medical emergencies, dial 911.

## 2020-01-30 NOTE — PROGRESS NOTES
1. Have you been to the ER, urgent care clinic since your last visit? Hospitalized since your last visit?no    2. Have you seen or consulted any other health care providers outside of the [address] [place] [address] since your last visit? Include any pap smears or colon screening. No    3 most recent PHQ Screens 1/2/2020   PHQ Not Done Medical Reason (indicate in comments)   Little interest or pleasure in doing things Not at all   Feeling down, depressed, irritable, or hopeless Not at all   Total Score PHQ 2 0     Chief Complaint   Patient presents with    Joint Pain     f/u    Back Pain     Per Dr. Trisha España.,  verbal order given for needed amb poc labs.

## 2020-02-10 ENCOUNTER — OFFICE VISIT (OUTPATIENT)
Dept: INTERNAL MEDICINE CLINIC | Age: 60
End: 2020-02-10

## 2020-02-10 VITALS
DIASTOLIC BLOOD PRESSURE: 74 MMHG | OXYGEN SATURATION: 96 % | HEIGHT: 70 IN | WEIGHT: 200 LBS | HEART RATE: 98 BPM | RESPIRATION RATE: 16 BRPM | TEMPERATURE: 99 F | BODY MASS INDEX: 28.63 KG/M2 | SYSTOLIC BLOOD PRESSURE: 128 MMHG

## 2020-02-10 DIAGNOSIS — I10 ESSENTIAL HYPERTENSION: Primary | ICD-10-CM

## 2020-02-10 DIAGNOSIS — K21.9 GASTROESOPHAGEAL REFLUX DISEASE WITHOUT ESOPHAGITIS: ICD-10-CM

## 2020-02-10 NOTE — PATIENT INSTRUCTIONS
[employer] Activation    Thank you for requesting access to [employer]. Please follow the instructions below to securely access and download your online medical record. [employer] allows you to send messages to your doctor, view your test results, renew your prescriptions, schedule appointments, and more. How Do I Sign Up? 1. In your internet browser, go to [email or web address]  2. Click on the First Time User? Click Here link in the Sign In box. You will be redirect to the New Member Sign Up page. 3. Enter your [employer] Access Code exactly as it appears below. You will not need to use this code after youve completed the sign-up process. If you do not sign up before the expiration date, you must request a new code. [employer] Access Code: [email or web address]  Expires: 2020  1:59 PM (This is the date your [employer] access code will )    4. Enter the last four digits of your Social Security Number (xxxx) and Date of Birth (mm/dd/yyyy) as indicated and click Submit. You will be taken to the next sign-up page. 5. Create a [employer] ID. This will be your [employer] login ID and cannot be changed, so think of one that is secure and easy to remember. 6. Create a [employer] password. You can change your password at any time. 7. Enter your Password Reset Question and Answer. This can be used at a later time if you forget your password. 8. Enter your e-mail address. You will receive e-mail notification when new information is available in [address] 9. Click Sign Up. You can now view and download portions of your medical record. 10. Click the Download Summary menu link to download a portable copy of your medical information. Additional Information    If you have questions, please visit the Frequently Asked Questions section of the [employer] website at [email or web address] OneWed (Formerly Nearlyweds). [email or web address]/. Remember, [employer] is NOT to be used for urgent needs. For medical emergencies, dial 911.

## 2020-02-10 NOTE — PROGRESS NOTES
1. Have you been to the ER, urgent care clinic since your last visit? Hospitalized since your last visit? yes/patient first/htn    2. Have you seen or consulted any other health care providers outside of the [address] [place] [address] since your last visit? Include any pap smears or colon screening. No    3 most recent PHQ Screens 1/2/2020   PHQ Not Done Medical Reason (indicate in comments)   Little interest or pleasure in doing things Not at all   Feeling down, depressed, irritable, or hopeless Not at all   Total Score PHQ 2 0     Chief Complaint   Patient presents with    Hypertension     called Friday with c/o elevated B/P/Advise patient tp go To ER//Patient declined. ,said she would monitor it. Per Dr. Chao Overall.,  verbal order given for needed amb poc labs.

## 2020-02-10 NOTE — PROGRESS NOTES
Arnav Balderas is a 61 y.o. female and presents with Hypertension and ED Follow-up (urgent care)  . Subjective:  Hypertension Review:  The patient has essential hypertension,she states that her bp has been elevated. Diet and Lifestyle: generally follows a  low sodium diet, exercises sporadically  Home BP Monitoring: is not measured at home. Pertinent ROS: taking medications as instructed, no medication side effects noted, no TIA's, no chest pain on exertion, no dyspnea on exertion, no swelling of ankles. she has been on clonidine. GERD Review:   Patient has a history of gastroesophageal reflux with heartburn. Symptoms have been present for a few months. She denies dysphagia. She  has not lost weight. She denies melena, hematochezia, hematemesis, and coffee ground emesis. This has been associated with fullness after meals. She denies abdominal bloating and none. Medical therapy in the past has included proton pump inhibitor        Review of Systems  Constitutional: negative for fevers, chills, anorexia and weight loss  Eyes:   negative for visual disturbance and irritation  ENT:   negative for tinnitus,sore throat,nasal congestion,ear pains. hoarseness  Respiratory:  negative for cough, hemoptysis, dyspnea,wheezing  CV:   negative for chest pain, palpitations, lower extremity edema  GI:   negative for nausea, vomiting, diarrhea, abdominal pain,melena  Endo:               negative for polyuria,polydipsia,polyphagia,heat intolerance  Genitourinary: negative for frequency, dysuria and hematuria  Integument:  negative for rash and pruritus  Hematologic:  negative for easy bruising and gum/nose bleeding  Musculoskel:  back pain,   Neurological:  negative for headaches, dizziness, vertigo, memory problems and gait   Behavl/Psych: negative for feelings of anxiety, depression, mood changes    Past Medical History:   Diagnosis Date    Anemia, unspecified 2/21/2011    Arthritis, Degenerative,Knee 2/21/2011    Degenerative Disc Disease 2/21/2011    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 No pertinent surgical history. Social History     Socioeconomic History    Marital status: UNKNOWN     Spouse name: Not on file    Number of children: Not on file    Years of education: Not on file    Highest education level: Not on file   Tobacco Use    Smoking status: Former Smoker    Smokeless tobacco: Never Used   Substance and Sexual Activity    Alcohol use: Yes     Alcohol/week: 0.8 standard drinks     Types: 1 Glasses of wine per week     Comment: SOCIALLY    Drug use: No    Sexual activity: Never     Family History   Problem Relation Age of Onset    Diabetes Mother     Hypertension Mother     Hypertension Father     Cancer Father      Current Outpatient Medications   Medication Sig Dispense Refill    bumetanide (BUMEX) 1 mg tablet TAKE ONE TABLET BY MOUTH ONCE DAILY 30 Tab 12    hydroxychloroquine (PLAQUENIL) 200 mg tablet Take 2 Tabs by mouth daily. 60 Tab 3    losartan (COZAAR) 100 mg tablet Take 1 Tab by mouth daily. 30 Tab 12    famotidine (PEPCID) 20 mg tablet Take 1 Tab by mouth two (2) times a day. 60 Tab 3    cloNIDine HCl (CATAPRES) 0.2 mg tablet TAKE ONE TABLET BY MOUTH hs 30 Tab 3    amLODIPine (NORVASC) 10 mg tablet TAKE 1 TABLET BY MOUTH ONCE DAILY 30 Tab 3    baclofen (LIORESAL) 10 mg tablet Take 1 Tab by mouth three (3) times daily. 90 Tab 1    latanoprost (XALATAN) 0.005 % ophthalmic solution Administer 1 Drop to both eyes nightly.  cholecalciferol (VITAMIN D3) 1,000 unit cap Take 2,000 Units by mouth daily.  cyclobenzaprine (FLEXERIL) 10 mg tablet Take 1 Tab by mouth three (3) times daily as needed for Muscle Spasm(s).  20 Tab 0    fluticasone propionate (FLONASE) 50 mcg/actuation nasal spray 2 Sprays by Both Nostrils route daily. 1 Bottle 12    potassium chloride (KLOR-CON) 10 mEq tablet Take 2 Tabs by mouth daily. 60 Tab 5    naproxen (NAPROSYN) 500 mg tablet TAKE ONE TABLET BY MOUTH TWICE DAILY WITH  A  MEAL 60 Tab 6    cyclobenzaprine (FLEXERIL) 10 mg tablet TAKE ONE TABLET BY MOUTH TWICE DAILY (Patient taking differently: TAKE ONE TABLET BY MOUTH TWICE DAILY as needed) 60 Tab 12     No Known Allergies    Objective:  Visit Vitals  /74   Pulse 98   Temp 99 °F (37.2 °C) (Oral)   Resp 16   Ht 5' 10\" (1.778 m)   Wt 200 lb (90.7 kg)   SpO2 96%   BMI 28.70 kg/m²     Physical Exam:   General appearance - alert, well appearing, and in no distress  Mental status - alert, oriented to person, place, and time  EYE-KASEY, EOMI, corneas normal, no foreign bodies  ENT-ENT exam normal, no neck nodes or sinus tenderness  Nose - normal and patent, no erythema, discharge or polyps  Mouth - mucous membranes moist, pharynx normal without lesions  Neck - supple, no significant adenopathy   Chest - clear to auscultation, no wheezes, rales or rhonchi, symmetric air entry   Heart - normal rate, regular rhythm, normal S1, S2, no murmurs, rubs, clicks or gallops   Abdomen - soft, nontender, nondistended, no masses or organomegaly  Lymph- no adenopathy palpable  Ext-peripheral pulses normal, no pedal edema, no clubbing or cyanosis  Skin-Warm and dry.  no hyperpigmentation, vitiligo, or suspicious lesions  Neuro -alert, oriented, normal speech, no focal findings or movement disorder noted  Neck-normal C-spine, no tenderness, full ROM without pain  Feet-no nail deformities or callus formation with good pulses noted      Results for orders placed or performed in visit on 01/02/20   AMB POC LIPID PROFILE   Result Value Ref Range    Cholesterol (POC) 176     Triglycerides (POC) 221     HDL Cholesterol (POC) 38     Non-HDL Cholesterol      LDL Cholesterol (POC) 94 MG/DL    TChol/HDL Ratio (POC) 4.7        Assessment/Plan:    ICD-10-CM ICD-9-CM    1. Essential hypertension I10 401.9    2. Gastroesophageal reflux disease without esophagitis K21.9 530.81      No orders of the defined types were placed in this encounter. lose weight, follow low fat diet, follow low salt diet, continue present plan  Patient Instructions   MyChart Activation    Thank you for requesting access to [employer]. Please follow the instructions below to securely access and download your online medical record. [employer] allows you to send messages to your doctor, view your test results, renew your prescriptions, schedule appointments, and more. How Do I Sign Up? 1. In your internet browser, go to [email or web address]  2. Click on the First Time User? Click Here link in the Sign In box. You will be redirect to the New Member Sign Up page. 3. Enter your In[employer] Access Code exactly as it appears below. You will not need to use this code after youve completed the sign-up process. If you do not sign up before the expiration date, you must request a new code. [employer] Access Code: [email or web address]  Expires: 2020  1:59 PM (This is the date your [employer] access code will )    4. Enter the last four digits of your Social Security Number (xxxx) and Date of Birth (mm/dd/yyyy) as indicated and click Submit. You will be taken to the next sign-up page. 5. Create a In[employer]alator ID. This will be your [employer] login ID and cannot be changed, so think of one that is secure and easy to remember. 6. Create a In[employer] password. You can change your password at any time. 7. Enter your Password Reset Question and Answer. This can be used at a later time if you forget your password. 8. Enter your e-mail address. You will receive e-mail notification when new information is available in [address] 9. Click Sign Up. You can now view and download portions of your medical record.   10. Click the Download Summary menu link to download a portable copy of your medical information. Additional Information    If you have questions, please visit the Frequently Asked Questions section of the [employer] website at [email or web address] Remember, [employer] is NOT to be used for urgent needs. For medical emergencies, dial 911. Follow-up and Dispositions    · Return in about 3 months (around 5/10/2020), or if symptoms worsen or fail to improve. I have reviewed with the patient details of the assessment and plan and all questions were answered. Relevent patient education was performed    An After Visit Summary was printed and given to the patient.

## 2020-03-07 DIAGNOSIS — M50.30 DEGENERATION OF CERVICAL INTERVERTEBRAL DISC: ICD-10-CM

## 2020-03-09 RX ORDER — NAPROXEN 500 MG/1
TABLET ORAL
Qty: 60 TAB | Refills: 0 | Status: SHIPPED | OUTPATIENT
Start: 2020-03-09 | End: 2020-07-13

## 2020-05-09 RX ORDER — FAMOTIDINE 40 MG/1
40 TABLET, FILM COATED ORAL DAILY
Qty: 30 TAB | Refills: 12 | OUTPATIENT
Start: 2020-05-09 | End: 2020-07-27

## 2020-05-13 ENCOUNTER — VIRTUAL VISIT (OUTPATIENT)
Dept: INTERNAL MEDICINE CLINIC | Age: 60
End: 2020-05-13

## 2020-05-13 DIAGNOSIS — I10 ESSENTIAL HYPERTENSION: ICD-10-CM

## 2020-05-13 DIAGNOSIS — H61.23 EXCESSIVE CERUMEN IN BOTH EAR CANALS: ICD-10-CM

## 2020-05-13 DIAGNOSIS — J30.1 SEASONAL ALLERGIC RHINITIS DUE TO POLLEN: Primary | ICD-10-CM

## 2020-05-13 RX ORDER — IPRATROPIUM BROMIDE 42 UG/1
2 SPRAY, METERED NASAL 4 TIMES DAILY
Qty: 15 ML | Refills: 3 | Status: SHIPPED | OUTPATIENT
Start: 2020-05-13

## 2020-05-13 RX ORDER — CODEINE PHOSPHATE AND GUAIFENESIN 10; 100 MG/5ML; MG/5ML
5 SOLUTION ORAL
Qty: 118 ML | Refills: 0 | Status: SHIPPED | OUTPATIENT
Start: 2020-05-13 | End: 2020-05-20

## 2020-05-13 NOTE — PROGRESS NOTES
Tej Thayer is a 61 y.o. female being evaluated by a Virtual Visit (video visit) encounter to address concerns as mentioned above. A caregiver was present when appropriate. Due to this being a TeleHealth encounter (During FDZEG-01 public health emergency), evaluation of the following organ systems was limited: Vitals/Constitutional/EENT/Resp/CV/GI//MS/Neuro/Skin/Heme-Lymph-Imm. Pursuant to the emergency declaration under the [address] [place] [address] and the [employer] Act, this Virtual Visit was conducted with patient's (and/or legal guardian's) consent, to reduce the risk of exposure to COVID-19 and provide necessary medical care. Services were provided through a video synchronous discussion virtually to substitute for in-person encounter. --Ronny Garcia MD on 5/13/2020 at 1:51 PM    An electronic signature was used to authenticate this note. Tej Thayer is a 61 y.o. female and presents with Ear Fullness and Cough  . Subjective:    Rt.ear fullness reported    Allergic Rhinitis  Patient presents for evaluation of allergic symptoms. Symptoms include nasal congestion, rhinorrhea, sneezing, eye itching, watery eyes. Precipitants haved included possible pollen. She has a recurrent dry cough. Review of Systems  Constitutional: negative for fevers, chills, anorexia and weight loss  Eyes:   negative for visual disturbance and irritation  ENT:   negative for tinnitus,sore throat,nasal congestion,ear pains. hoarseness  Respiratory:  negative for cough, hemoptysis, dyspnea,wheezing  CV:   negative for chest pain, palpitations, lower extremity edema  GI:   negative for nausea, vomiting, diarrhea, abdominal pain,melena  Endo:               negative for polyuria,polydipsia,polyphagia,heat intolerance  Genitourinary: negative for frequency, dysuria and hematuria  Integument:  negative for rash and pruritus  Hematologic:  negative for easy bruising and gum/nose bleeding  Musculoskel: negative for myalgias, arthralgias, back pain, muscle weakness, joint pain  Neurological:  negative for headaches, dizziness, vertigo, memory problems and gait   Behavl/Psych: negative for feelings of anxiety, depression, mood changes    Past Medical History:   Diagnosis Date    Anemia, unspecified 2/21/2011    Arthritis, Degenerative,Knee 2/21/2011    Degenerative Disc Disease 2/21/2011    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 Social History     Socioeconomic History    Marital status: UNKNOWN     Spouse name: Not on file    Number of children: Not on file    Years of education: Not on file    Highest education level: Not on file   Tobacco Use    Smoking status: Former Smoker    Smokeless tobacco: Never Used   Substance and Sexual Activity    Alcohol use: Yes     Alcohol/week: 0.8 standard drinks     Types: 1 Glasses of wine per week     Comment: SOCIALLY    Drug use: No    Sexual activity: Never     Family History   Problem Relation Age of Onset    Diabetes Mother     Hypertension Mother     Hypertension Father     Cancer Father      Current Outpatient Medications   Medication Sig Dispense Refill    famotidine (PEPCID) 40 mg tablet Take 1 Tab by mouth daily. 30 Tab 12    amLODIPine (NORVASC) 10 mg tablet Take 1 tablet by mouth once daily 30 Tab 5    bumetanide (BUMEX) 1 mg tablet TAKE ONE TABLET BY MOUTH ONCE DAILY 30 Tab 12    famotidine (PEPCID) 20 mg tablet Take 1 Tab by mouth two (2) times a day. 60 Tab 3    cloNIDine HCl (CATAPRES) 0.2 mg tablet TAKE ONE TABLET BY MOUTH hs 30 Tab 3    baclofen (LIORESAL) 10 mg tablet Take 1 Tab by mouth three (3) times daily.  90 Tab 1  cholecalciferol (VITAMIN D3) 1,000 unit cap Take 2,000 Units by mouth daily.  cyclobenzaprine (FLEXERIL) 10 mg tablet Take 1 Tab by mouth three (3) times daily as needed for Muscle Spasm(s). 20 Tab 0    fluticasone propionate (FLONASE) 50 mcg/actuation nasal spray 2 Sprays by Both Nostrils route daily. 1 Bottle 12    cyclobenzaprine (FLEXERIL) 10 mg tablet TAKE ONE TABLET BY MOUTH TWICE DAILY (Patient taking differently: TAKE ONE TABLET BY MOUTH TWICE DAILY as needed) 60 Tab 12    naproxen (NAPROSYN) 500 mg tablet TAKE 1 TABLET BY MOUTH TWICE DAILY WITH MEALS 60 Tab 0    hydroxychloroquine (PLAQUENIL) 200 mg tablet Take 2 Tabs by mouth daily. 60 Tab 3    losartan (COZAAR) 100 mg tablet Take 1 Tab by mouth daily. 30 Tab 12    latanoprost (XALATAN) 0.005 % ophthalmic solution Administer 1 Drop to both eyes nightly.  potassium chloride (KLOR-CON) 10 mEq tablet Take 2 Tabs by mouth daily. 60 Tab 5     No Known Allergies    Objective: There were no vitals taken for this visit. Physical Exam:   General appearance - alert, well appearing, and in no distress  Mental status - alert, oriented to person, place, and time  EYE-KASEY, EOMI, corneas normal, no foreign bodies  ENT-ENT exam normal, no neck nodes or sinus tenderness  Nose - normal and patent, no erythema, discharge or polyps  Mouth - mucous membranes moist, pharynx normal without lesions  Skin-Warm and dry. no hyperpigmentation, vitiligo, or suspicious lesions  Neuro -alert, oriented, normal speech, no focal findings or movement disorder noted  Neck-normal C-spine, no tenderness, full ROM without pain        Results for orders placed or performed in visit on 01/02/20   AMB POC LIPID PROFILE   Result Value Ref Range    Cholesterol (POC) 176     Triglycerides (POC) 221     HDL Cholesterol (POC) 38     Non-HDL Cholesterol      LDL Cholesterol (POC) 94 MG/DL    TChol/HDL Ratio (POC) 4.7        Assessment/Plan:  No diagnosis found.   No orders of the defined types were placed in this encounter. call if any problems,Take 81mg aspirin daily  There are no Patient Instructions on file for this visit. I have reviewed with the patient details of the assessment and plan and all questions were answered. Relevent patient education was performed. The most recent lab findings were reviewed with the patient. An After Visit Summary was printed and given to the patient.

## 2020-05-13 NOTE — PROGRESS NOTES
Chief Complaint   Patient presents with    Ear Fullness    Cough       3 most recent PHQ Screens 5/13/2020   PHQ Not Done -   Little interest or pleasure in doing things Not at all   Feeling down, depressed, irritable, or hopeless Not at all   Total Score PHQ 2 0     1. Have you been to the ER, urgent care clinic since your last visit? Hospitalized since your last visit?no    2. Have you seen or consulted any other health care providers outside of the [address] [place] [address] since your last visit? Include any pap smears or colon screening. no

## 2020-07-08 ENCOUNTER — TELEPHONE (OUTPATIENT)
Dept: OTHER | Age: 60
End: 2020-07-08

## 2020-07-08 NOTE — TELEPHONE ENCOUNTER
This writer called patient related to medication adherence, patient verified name and date of birth. This writer advised patient, I am calling from [facility] [place] and work with the [facility]. Medications reviewed, patient is taking medications as prescribed on medication list in [facility]. Patient reports she just refilled all of her medications 5/2020 and will be due for refill end of July 2020.     Patient reports she will request refills from her provider Carla Ruvalcaba MD  At her next visit on 8/13/2020

## 2020-07-27 RX ORDER — FAMOTIDINE 40 MG/1
TABLET, FILM COATED ORAL
Qty: 30 TAB | Refills: 0 | Status: SHIPPED | OUTPATIENT
Start: 2020-07-27 | End: 2020-08-31

## 2020-08-13 ENCOUNTER — OFFICE VISIT (OUTPATIENT)
Dept: INTERNAL MEDICINE CLINIC | Age: 60
End: 2020-08-13
Payer: MEDICARE

## 2020-08-13 VITALS
WEIGHT: 192 LBS | HEIGHT: 70 IN | HEART RATE: 79 BPM | DIASTOLIC BLOOD PRESSURE: 80 MMHG | RESPIRATION RATE: 16 BRPM | SYSTOLIC BLOOD PRESSURE: 130 MMHG | BODY MASS INDEX: 27.49 KG/M2 | TEMPERATURE: 98.5 F | OXYGEN SATURATION: 99 %

## 2020-08-13 DIAGNOSIS — K21.9 GASTROESOPHAGEAL REFLUX DISEASE WITHOUT ESOPHAGITIS: ICD-10-CM

## 2020-08-13 DIAGNOSIS — Z00.00 MEDICARE ANNUAL WELLNESS VISIT, SUBSEQUENT: ICD-10-CM

## 2020-08-13 DIAGNOSIS — I10 ESSENTIAL HYPERTENSION: Primary | ICD-10-CM

## 2020-08-13 LAB
CHOLEST SERPL-MCNC: 154 MG/DL
HDLC SERPL-MCNC: 31 MG/DL
LDL CHOLESTEROL POC: 97 MG/DL
NON HDL CHOL. (LDL+VLDL), 804568: 123
TCHOL/HDL RATIO (POC): 4.9
TRIGL SERPL-MCNC: 127 MG/DL

## 2020-08-13 PROCEDURE — G8510 SCR DEP NEG, NO PLAN REQD: HCPCS | Performed by: INTERNAL MEDICINE

## 2020-08-13 PROCEDURE — G0439 PPPS, SUBSEQ VISIT: HCPCS | Performed by: INTERNAL MEDICINE

## 2020-08-13 PROCEDURE — G9899 SCRN MAM PERF RSLTS DOC: HCPCS | Performed by: INTERNAL MEDICINE

## 2020-08-13 PROCEDURE — 3017F COLORECTAL CA SCREEN DOC REV: CPT | Performed by: INTERNAL MEDICINE

## 2020-08-13 PROCEDURE — G8427 DOCREV CUR MEDS BY ELIG CLIN: HCPCS | Performed by: INTERNAL MEDICINE

## 2020-08-13 PROCEDURE — G8419 CALC BMI OUT NRM PARAM NOF/U: HCPCS | Performed by: INTERNAL MEDICINE

## 2020-08-13 PROCEDURE — 99213 OFFICE O/P EST LOW 20 MIN: CPT | Performed by: INTERNAL MEDICINE

## 2020-08-13 PROCEDURE — G8754 DIAS BP LESS 90: HCPCS | Performed by: INTERNAL MEDICINE

## 2020-08-13 PROCEDURE — 80061 LIPID PANEL: CPT | Performed by: INTERNAL MEDICINE

## 2020-08-13 PROCEDURE — G8752 SYS BP LESS 140: HCPCS | Performed by: INTERNAL MEDICINE

## 2020-08-13 RX ORDER — POTASSIUM CHLORIDE 750 MG/1
20 TABLET, EXTENDED RELEASE ORAL DAILY
Qty: 60 TAB | Refills: 12 | Status: SHIPPED | OUTPATIENT
Start: 2020-08-13 | End: 2021-06-22 | Stop reason: SDUPTHER

## 2020-08-13 NOTE — PROGRESS NOTES
1. Have you been to the ER, urgent care clinic since your last visit? Hospitalized since your last visit?no    2. Have you seen or consulted any other health care providers outside of the [address] [place] [address] since your last visit? Include any pap smears or colon screening. No    3 most recent PHQ Screens 5/13/2020   PHQ Not Done -   Little interest or pleasure in doing things Not at all   Feeling down, depressed, irritable, or hopeless Not at all   Total Score PHQ 2 0     Chief Complaint   Patient presents with    Hypertension     Per Dr. Jael Conley.,  verbal order given for needed amb poc labs.

## 2020-08-13 NOTE — PROGRESS NOTES
.  Subjective:    Hypertension Review:  The patient has essential hypertension  Diet and Lifestyle: generally follows a  low sodium diet, exercises sporadically  Home BP Monitoring: is not measured at home. Pertinent ROS: taking medications as instructed, no medication side effects noted, no TIA's, no chest pain on exertion, no dyspnea on exertion, no swelling of ankles. GERD Review:   Patient has a history of gastroesophageal reflux with heartburn. Symptoms have been present for a few months. She denies dysphagia. She  has not lost weight. She denies melena, hematochezia, hematemesis, and coffee ground emesis. This has been associated with fullness after meals. She denies abdominal bloating and none. Medical therapy in the past has included proton pump inhibitor      She has a history of discoid lupus    Tomasz Joyner is a 61 y.o. female and presents for annual Medicare Wellness Visit. Problem List: Reviewed with patient and discussed risk factors. Patient Active Problem List   Diagnosis Code    Osteoarthrosis involving lower leg M17.10    Tendonitis M77.9    Osteoartritis S93.409A    Degenerative Disc Disease [id number]    Essential hypertension, benign I10    Anemia, unspecified D64.9    Xerosis L85.3    Discoid lupus L93.0    GERD, Gastro-Esophageal Reflux Disease K21.9    Osteoarthritis M16.9       Current medical providers:  Patient Care Team:  Consuelo Ward MD as PCP - General (Internal Medicine)    PSH: Reviewed with patient  History reviewed. No pertinent surgical history. SH: Reviewed with patient  Social History     Tobacco Use    Smoking status: Former Smoker    Smokeless tobacco: Never Used   Substance Use Topics    Alcohol use:  Yes     Alcohol/week: 0.8 standard drinks     Types: 1 Glasses of wine per week     Comment: SOCIALLY    Drug use: No       FH: Reviewed with patient  Family History   Problem Relation Age of Onset    Diabetes Mother     Hypertension Mother     Hypertension Father     Cancer Father        Medications/Allergies: Reviewed with patient  Current Outpatient Medications on File Prior to Visit   Medication Sig Dispense Refill    famotidine (PEPCID) 40 mg tablet Take 1/2 (one-half) tablet by mouth twice daily 30 Tab 0    naproxen (NAPROSYN) 500 mg tablet TAKE 1 TABLET BY MOUTH TWICE DAILY WITH MEALS 60 Tab 3    ipratropium (ATROVENT) 42 mcg (0.06 %) nasal spray 2 Sprays by Both Nostrils route four (4) times daily. 15 mL 3    amLODIPine (NORVASC) 10 mg tablet Take 1 tablet by mouth once daily 30 Tab 5    bumetanide (BUMEX) 1 mg tablet TAKE ONE TABLET BY MOUTH ONCE DAILY 30 Tab 12    hydroxychloroquine (PLAQUENIL) 200 mg tablet Take 2 Tabs by mouth daily. 60 Tab 3    losartan (COZAAR) 100 mg tablet Take 1 Tab by mouth daily. 30 Tab 12    famotidine (PEPCID) 20 mg tablet Take 1 Tab by mouth two (2) times a day. 60 Tab 3    cloNIDine HCl (CATAPRES) 0.2 mg tablet TAKE ONE TABLET BY MOUTH hs 30 Tab 3    baclofen (LIORESAL) 10 mg tablet Take 1 Tab by mouth three (3) times daily. 90 Tab 1    latanoprost (XALATAN) 0.005 % ophthalmic solution Administer 1 Drop to both eyes nightly.  cholecalciferol (VITAMIN D3) 1,000 unit cap Take 2,000 Units by mouth daily.  cyclobenzaprine (FLEXERIL) 10 mg tablet Take 1 Tab by mouth three (3) times daily as needed for Muscle Spasm(s). 20 Tab 0    fluticasone propionate (FLONASE) 50 mcg/actuation nasal spray 2 Sprays by Both Nostrils route daily. 1 Bottle 12    cyclobenzaprine (FLEXERIL) 10 mg tablet TAKE ONE TABLET BY MOUTH TWICE DAILY (Patient taking differently: TAKE ONE TABLET BY MOUTH TWICE DAILY as needed) 60 Tab 12     No current facility-administered medications on file prior to visit.        No Known Allergies    Objective:  Visit Vitals  /80   Pulse 79   Temp 98.5 °F (36.9 °C) (Oral)   Resp 16   Ht 5' 10\" (1.778 m)   Wt 192 lb (87.1 kg)   SpO2 99%   BMI 27.55 kg/m²    Body mass index is 27.55 kg/m². Assessment of cognitive impairment: Alert and oriented x 3    Depression Screen:   3 most recent PHQ Screens 8/13/2020   PHQ Not Done Medical Reason (indicate in comments)   Little interest or pleasure in doing things Not at all   Feeling down, depressed, irritable, or hopeless Not at all   Total Score PHQ 2 0     Depression Review:  Patient is seen for screen of depression,denies anhedonia, weight gain, insomnia, hypersomnia, psychomotor agitation, psychomotor retardation, fatigue, feelings of worthlessness/guilt, difficulty concentrating, hopelessness, impaired memory and recurrent thoughts of death Treatment includes no medication   She denies recurrent thoughts of death and suicidal thoughts without plan. Fall Risk Assessment:    Fall Risk Assessment, last 12 mths 5/13/2020   Able to walk? Yes   Fall in past 12 months? No       Functional Ability:   Does the patient exhibit a steady gait? yes   How long did it take the patient to get up and walk from a sitting position? seconds   Is the patient self reliant?  (ie can do own laundry, meals, household chores)  yes     Does the patient handle his/her own medications? yes     Does the patient handle his/her own money? yes     Is the patients home safe (ie good lighting, handrails on stairs and bath, etc.)? yes     Did you notice or did patient express any hearing difficulties? no     Did you notice or did patient express any vision difficulties?   no     Were distance and reading eye charts used? no       Advance Care Planning:   Patient was offered the opportunity to discuss advance care planning:  yes     Does patient have an Advance Directive:  no   If no, did you provide information on [facility] Connections?   yes       Plan:      Orders Placed This Encounter    AMB POC LIPID PROFILE    potassium chloride (KLOR-CON) 10 mEq tablet       Health Maintenance   Topic Date Due    Shingrix Vaccine Age 49> (1 of 2) 04/04/2010    FOBT Q1Y Age 50-75  02/02/2017    Influenza Age 5 to Adult  08/01/2020    Medicare Yearly Exam  07/16/2020    Breast Cancer Screen Mammogram  11/13/2020    PAP AKA CERVICAL CYTOLOGY  10/24/2021    Lipid Screen  01/02/2025    DTaP/Tdap/Td series (2 - Td) 11/17/2025    Hepatitis C Screening  Addressed    Pneumococcal 0-64 years  Aged Out       *Patient verbalized understanding and agreement with the plan. A copy of the After Visit Summary with personalized health plan was given to the patient today. Review of Systems  Constitutional: negative for fevers, chills, anorexia and weight loss  Eyes:   negative for visual disturbance and irritation  ENT:   negative for tinnitus,sore throat,nasal congestion,ear pains. hoarseness  Respiratory:  negative for cough, hemoptysis, dyspnea,wheezing  CV:   negative for chest pain, palpitations, lower extremity edema  GI:   negative for nausea, vomiting, diarrhea, abdominal pain,melena  Endo:               negative for polyuria,polydipsia,polyphagia,heat intolerance  Genitourinary: negative for frequency, dysuria and hematuria  Integument:  negative for rash and pruritus  Hematologic:  negative for easy bruising and gum/nose bleeding  Musculoskel: negative for myalgias, arthralgias, back pain, muscle weakness, joint pain  Neurological:  negative for headaches, dizziness, vertigo, memory problems and gait   Behavl/Psych: negative for feelings of anxiety, depression, mood changes    Past Medical History:   Diagnosis Date    Anemia, unspecified 2/21/2011    Arthritis, Degenerative,Knee 2/21/2011    Degenerative Disc Disease 2/21/2011    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 No pertinent surgical history.   Social History     Socioeconomic History    Marital status: UNKNOWN Spouse name: Not on file    Number of children: Not on file    Years of education: Not on file    Highest education level: Not on file   Tobacco Use    Smoking status: Former Smoker    Smokeless tobacco: Never Used   Substance and Sexual Activity    Alcohol use: Yes     Alcohol/week: 0.8 standard drinks     Types: 1 Glasses of wine per week     Comment: SOCIALLY    Drug use: No    Sexual activity: Never     Family History   Problem Relation Age of Onset    Diabetes Mother     Hypertension Mother     Hypertension Father     Cancer Father      Current Outpatient Medications   Medication Sig Dispense Refill    potassium chloride (KLOR-CON) 10 mEq tablet Take 2 Tabs by mouth daily. 60 Tab 12    famotidine (PEPCID) 40 mg tablet Take 1/2 (one-half) tablet by mouth twice daily 30 Tab 0    naproxen (NAPROSYN) 500 mg tablet TAKE 1 TABLET BY MOUTH TWICE DAILY WITH MEALS 60 Tab 3    ipratropium (ATROVENT) 42 mcg (0.06 %) nasal spray 2 Sprays by Both Nostrils route four (4) times daily. 15 mL 3    amLODIPine (NORVASC) 10 mg tablet Take 1 tablet by mouth once daily 30 Tab 5    bumetanide (BUMEX) 1 mg tablet TAKE ONE TABLET BY MOUTH ONCE DAILY 30 Tab 12    hydroxychloroquine (PLAQUENIL) 200 mg tablet Take 2 Tabs by mouth daily. 60 Tab 3    losartan (COZAAR) 100 mg tablet Take 1 Tab by mouth daily. 30 Tab 12    famotidine (PEPCID) 20 mg tablet Take 1 Tab by mouth two (2) times a day. 60 Tab 3    cloNIDine HCl (CATAPRES) 0.2 mg tablet TAKE ONE TABLET BY MOUTH hs 30 Tab 3    baclofen (LIORESAL) 10 mg tablet Take 1 Tab by mouth three (3) times daily. 90 Tab 1    latanoprost (XALATAN) 0.005 % ophthalmic solution Administer 1 Drop to both eyes nightly.  cholecalciferol (VITAMIN D3) 1,000 unit cap Take 2,000 Units by mouth daily.  cyclobenzaprine (FLEXERIL) 10 mg tablet Take 1 Tab by mouth three (3) times daily as needed for Muscle Spasm(s).  20 Tab 0    fluticasone propionate (FLONASE) 50 mcg/actuation nasal spray 2 Sprays by Both Nostrils route daily. 1 Bottle 12    cyclobenzaprine (FLEXERIL) 10 mg tablet TAKE ONE TABLET BY MOUTH TWICE DAILY (Patient taking differently: TAKE ONE TABLET BY MOUTH TWICE DAILY as needed) 60 Tab 12     No Known Allergies    Objective:  Visit Vitals  /80   Pulse 79   Temp 98.5 °F (36.9 °C) (Oral)   Resp 16   Ht 5' 10\" (1.778 m)   Wt 192 lb (87.1 kg)   SpO2 99%   BMI 27.55 kg/m²     Physical Exam:   General appearance - alert, well appearing, and in no distress  Mental status - alert, oriented to person, place, and time  EYE-KASEY, EOMI, corneas normal, no foreign bodies  ENT-ENT exam normal, no neck nodes or sinus tenderness  Nose - normal and patent, no erythema, discharge or polyps  Mouth - mucous membranes moist, pharynx normal without lesions  Neck - supple, no significant adenopathy   Chest - clear to auscultation, no wheezes, rales or rhonchi, symmetric air entry   Heart - normal rate, regular rhythm, normal S1, S2, no murmurs, rubs, clicks or gallops   Abdomen - soft, nontender, nondistended, no masses or organomegaly  Lymph- no adenopathy palpable  Ext-peripheral pulses normal, no pedal edema, no clubbing or cyanosis  Skin-Warm and dry. no hyperpigmentation, vitiligo, or suspicious lesions  Neuro -alert, oriented, normal speech, no focal findings or movement disorder noted  Neck-normal C-spine, no tenderness, full ROM without pain  Feet-no nail deformities or callus formation with good pulses noted      Results for orders placed or performed in visit on 08/13/20   AMB POC LIPID PROFILE   Result Value Ref Range    Cholesterol (POC) 154     Triglycerides (POC) 127     HDL Cholesterol (POC) 31     LDL+VLDL 123     LDL Cholesterol (POC) 97 MG/DL    TChol/HDL Ratio (POC) 4.9        Assessment/Plan:    ICD-10-CM ICD-9-CM    1. Essential hypertension  I10 401.9 AMB POC LIPID PROFILE   2. Medicare annual wellness visit, subsequent  Z00.00 V70.0    3.  Gastroesophageal reflux disease without esophagitis  K21.9 530.81      Orders Placed This Encounter    AMB POC LIPID PROFILE    potassium chloride (KLOR-CON) 10 mEq tablet     Sig: Take 2 Tabs by mouth daily. Dispense:  60 Tab     Refill:  12     lose weight, follow low fat diet, follow low salt diet, call if any problems  Patient Instructions   MyChart Activation    Thank you for requesting access to [facility]. Please follow the instructions below to securely access and download your online medical record. [employer] allows you to send messages to your doctor, view your test results, renew your prescriptions, schedule appointments, and more. How Do I Sign Up? 1. In your internet browser, go to [email or web address]  2. Click on the First Time User? Click Here link in the Sign In box. You will be redirect to the New Member Sign Up page. 3. Enter your [employer] Access Code exactly as it appears below. You will not need to use this code after youve completed the sign-up process. If you do not sign up before the expiration date, you must request a new code. [employer] Access Code: [email or web address]  Expires: 2020  1:49 PM (This is the date your [employer] access code will )    4. Enter the last four digits of your Social Security Number (xxxx) and Date of Birth (mm/dd/yyyy) as indicated and click Submit. You will be taken to the next sign-up page. 5. Create a [employer] ID. This will be your [employer] login ID and cannot be changed, so think of one that is secure and easy to remember. 6. Create a [employer] password. You can change your password at any time. 7. Enter your Password Reset Question and Answer. This can be used at a later time if you forget your password. 8. Enter your e-mail address. You will receive e-mail notification when new information is available in [address] 9. Click Sign Up. You can now view and download portions of your medical record.   10. Click the Download Summary menu link to download a portable copy of your medical information. Additional Information    If you have questions, please visit the Frequently Asked Questions section of the [facility] website at [email or web address] Remember, [facility] is NOT to be used for urgent needs. For medical emergencies, dial 911. Follow-up and Dispositions    · Return in about 3 months (around 11/13/2020), or if symptoms worsen or fail to improve. I have reviewed with the patient details of the assessment and plan and all questions were answered. Relevent patient education was performed    An After Visit Summary was printed and given to the patient.

## 2020-08-13 NOTE — PATIENT INSTRUCTIONS
[facility] [employer] Activation Thank you for requesting access to [employer]. Please follow the instructions below to securely access and download your online medical record. [facility] allows you to send messages to your doctor, view your test results, renew your prescriptions, schedule appointments, and more. How Do I Sign Up? 1. In your internet browser, go to [email or web address] 
2. Click on the First Time User? Click Here link in the Sign In box. You will be redirect to the New Member Sign Up page. 3. Enter your [facility] Access Code exactly as it appears below. You will not need to use this code after youve completed the sign-up process. If you do not sign up before the expiration date, you must request a new code. [employer] Access Code: [email or web address] Expires: 2020  1:49 PM (This is the date your [employer] access code will ) 4. Enter the last four digits of your Social Security Number (xxxx) and Date of Birth (mm/dd/yyyy) as indicated and click Submit. You will be taken to the next sign-up page. 5. Create a [facility] ID. This will be your [employer] login ID and cannot be changed, so think of one that is secure and easy to remember. 6. Create a [facility] password. You can change your password at any time. 7. Enter your Password Reset Question and Answer. This can be used at a later time if you forget your password. 8. Enter your e-mail address. You will receive e-mail notification when new information is available in [address] 9. Click Sign Up. You can now view and download portions of your medical record. 10. Click the Download Summary menu link to download a portable copy of your medical information. Additional Information If you have questions, please visit the Frequently Asked Questions section of the [employer] website at [email or web address] Remember, [facility] is NOT to be used for urgent needs. For medical emergencies, dial 911.

## 2020-08-31 RX ORDER — FAMOTIDINE 40 MG/1
TABLET, FILM COATED ORAL
Qty: 30 TAB | Refills: 0 | Status: SHIPPED | OUTPATIENT
Start: 2020-08-31 | End: 2020-09-27

## 2020-09-27 RX ORDER — FAMOTIDINE 40 MG/1
TABLET, FILM COATED ORAL
Qty: 30 TAB | Refills: 0 | Status: SHIPPED | OUTPATIENT
Start: 2020-09-27 | End: 2020-11-05

## 2020-11-05 RX ORDER — CLONIDINE HYDROCHLORIDE 0.2 MG/1
TABLET ORAL
Qty: 90 TAB | Refills: 0 | Status: SHIPPED | OUTPATIENT
Start: 2020-11-05 | End: 2021-06-22 | Stop reason: SDUPTHER

## 2020-11-05 RX ORDER — FAMOTIDINE 40 MG/1
TABLET, FILM COATED ORAL
Qty: 30 TAB | Refills: 0 | Status: SHIPPED | OUTPATIENT
Start: 2020-11-05 | End: 2020-12-14

## 2020-11-18 ENCOUNTER — OFFICE VISIT (OUTPATIENT)
Dept: INTERNAL MEDICINE CLINIC | Age: 60
End: 2020-11-18
Payer: MEDICARE

## 2020-11-18 VITALS
BODY MASS INDEX: 27.77 KG/M2 | TEMPERATURE: 98.8 F | HEIGHT: 70 IN | RESPIRATION RATE: 16 BRPM | HEART RATE: 72 BPM | DIASTOLIC BLOOD PRESSURE: 90 MMHG | WEIGHT: 194 LBS | SYSTOLIC BLOOD PRESSURE: 150 MMHG

## 2020-11-18 DIAGNOSIS — L93.0 DISCOID LUPUS: ICD-10-CM

## 2020-11-18 DIAGNOSIS — Z23 NEEDS FLU SHOT: ICD-10-CM

## 2020-11-18 DIAGNOSIS — I10 ESSENTIAL HYPERTENSION: Primary | ICD-10-CM

## 2020-11-18 LAB
ALBUMIN SERPL-MCNC: 3.7 G/DL (ref 3.5–5)
ALBUMIN/GLOB SERPL: 0.8 {RATIO} (ref 1.1–2.2)
ALP SERPL-CCNC: 150 U/L (ref 45–117)
ALT SERPL-CCNC: 20 U/L (ref 12–78)
ANION GAP SERPL CALC-SCNC: 2 MMOL/L (ref 5–15)
AST SERPL-CCNC: 23 U/L (ref 15–37)
BILIRUB SERPL-MCNC: 0.5 MG/DL (ref 0.2–1)
BUN SERPL-MCNC: 9 MG/DL (ref 6–20)
BUN/CREAT SERPL: 13 (ref 12–20)
CALCIUM SERPL-MCNC: 8.9 MG/DL (ref 8.5–10.1)
CHLORIDE SERPL-SCNC: 107 MMOL/L (ref 97–108)
CHOLEST SERPL-MCNC: 170 MG/DL
CO2 SERPL-SCNC: 32 MMOL/L (ref 21–32)
CREAT SERPL-MCNC: 0.7 MG/DL (ref 0.55–1.02)
ERYTHROCYTE [DISTWIDTH] IN BLOOD BY AUTOMATED COUNT: 14.8 % (ref 11.5–14.5)
GLOBULIN SER CALC-MCNC: 4.4 G/DL (ref 2–4)
GLUCOSE SERPL-MCNC: 79 MG/DL (ref 65–100)
HCT VFR BLD AUTO: 33.9 % (ref 35–47)
HDLC SERPL-MCNC: 34 MG/DL
HGB BLD-MCNC: 10.7 G/DL (ref 11.5–16)
LDL CHOLESTEROL POC: 107 MG/DL
MCH RBC QN AUTO: 28 PG (ref 26–34)
MCHC RBC AUTO-ENTMCNC: 31.6 G/DL (ref 30–36.5)
MCV RBC AUTO: 88.7 FL (ref 80–99)
NON-HDL CHOLESTEROL, 011976: 136
NRBC # BLD: 0 K/UL (ref 0–0.01)
NRBC BLD-RTO: 0 PER 100 WBC
PLATELET # BLD AUTO: 145 K/UL (ref 150–400)
PMV BLD AUTO: 12 FL (ref 8.9–12.9)
POTASSIUM SERPL-SCNC: 4 MMOL/L (ref 3.5–5.1)
PROT SERPL-MCNC: 8.1 G/DL (ref 6.4–8.2)
RBC # BLD AUTO: 3.82 M/UL (ref 3.8–5.2)
SODIUM SERPL-SCNC: 141 MMOL/L (ref 136–145)
TCHOL/HDL RATIO (POC): 5
TRIGL SERPL-MCNC: 146 MG/DL
WBC # BLD AUTO: 3.2 K/UL (ref 3.6–11)

## 2020-11-18 PROCEDURE — G8419 CALC BMI OUT NRM PARAM NOF/U: HCPCS | Performed by: INTERNAL MEDICINE

## 2020-11-18 PROCEDURE — G8432 DEP SCR NOT DOC, RNG: HCPCS | Performed by: INTERNAL MEDICINE

## 2020-11-18 PROCEDURE — G8427 DOCREV CUR MEDS BY ELIG CLIN: HCPCS | Performed by: INTERNAL MEDICINE

## 2020-11-18 PROCEDURE — 80061 LIPID PANEL: CPT | Performed by: INTERNAL MEDICINE

## 2020-11-18 PROCEDURE — G9899 SCRN MAM PERF RSLTS DOC: HCPCS | Performed by: INTERNAL MEDICINE

## 2020-11-18 PROCEDURE — G8753 SYS BP > OR = 140: HCPCS | Performed by: INTERNAL MEDICINE

## 2020-11-18 PROCEDURE — 99213 OFFICE O/P EST LOW 20 MIN: CPT | Performed by: INTERNAL MEDICINE

## 2020-11-18 PROCEDURE — 3017F COLORECTAL CA SCREEN DOC REV: CPT | Performed by: INTERNAL MEDICINE

## 2020-11-18 PROCEDURE — G8755 DIAS BP > OR = 90: HCPCS | Performed by: INTERNAL MEDICINE

## 2020-11-18 NOTE — PATIENT INSTRUCTIONS
[facility] Activation Thank you for requesting access to [facility]. Please follow the instructions below to securely access and download your online medical record. [facility] allows you to send messages to your doctor, view your test results, renew your prescriptions, schedule appointments, and more. How Do I Sign Up? 1. In your internet browser, go to [email or web address] 
2. Click on the First Time User? Click Here link in the Sign In box. You will be redirect to the New Member Sign Up page. 3. Enter your [facility] Access Code exactly as it appears below. You will not need to use this code after youve completed the sign-up process. If you do not sign up before the expiration date, you must request a new code. [facility] Access Code: [email or web address] Expires: 2021  1:26 PM (This is the date your [facility] access code will ) 4. Enter the last four digits of your Social Security Number (xxxx) and Date of Birth (mm/dd/yyyy) as indicated and click Submit. You will be taken to the next sign-up page. 5. Create a [facility] ID. This will be your [facility] login ID and cannot be changed, so think of one that is secure and easy to remember. 6. Create a [facility] password. You can change your password at any time. 7. Enter your Password Reset Question and Answer. This can be used at a later time if you forget your password. 8. Enter your e-mail address. You will receive e-mail notification when new information is available in [address] 9. Click Sign Up. You can now view and download portions of your medical record. 10. Click the Download Summary menu link to download a portable copy of your medical information. Additional Information If you have questions, please visit the Frequently Asked Questions section of the [facility] website at [email or web address]. [email or web address] Tower Software. Startup [email or web address]/[email or web address]/. Remember, [facility] is NOT to be used for urgent needs. For medical emergencies, dial 911.

## 2020-11-18 NOTE — PROGRESS NOTES
Isaura Belcher is a 61 y.o. female and presents with Hypertension  . Subjective:  Hypertension Review:  The patient has essential hypertension  Diet and Lifestyle: generally follows a  low sodium diet, exercises sporadically  Home BP Monitoring: is not measured at home. Pertinent ROS: taking medications as instructed, no medication side effects noted, no TIA's, no chest pain on exertion, no dyspnea on exertion, no swelling of ankles. 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 Health maintenance suggests the needs for an influenza injection          Review of Systems  Constitutional: negative for fevers, chills, anorexia and weight loss  Eyes:   negative for visual disturbance and irritation  ENT:   negative for tinnitus,sore throat,nasal congestion,ear pains. hoarseness  Respiratory:  negative for cough, hemoptysis, dyspnea,wheezing  CV:   negative for chest pain, palpitations, lower extremity edema  GI:   negative for nausea, vomiting, diarrhea, abdominal pain,melena  Endo:               negative for polyuria,polydipsia,polyphagia,heat intolerance  Genitourinary: negative for frequency, dysuria and hematuria  Integument:  rash and pruritus  Hematologic:  negative for easy bruising and gum/nose bleeding  Musculoskel: negative for myalgias, arthralgias, back pain, muscle weakness, joint pain  Neurological:  negative for headaches, dizziness, vertigo, memory problems and gait   Behavl/Psych: negative for feelings of anxiety, depression, mood changes    Past Medical History:   Diagnosis Date    Anemia, unspecified 2/21/2011    Arthritis, Degenerative,Knee 2/21/2011    Degenerative Disc Disease 2/21/2011    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  No pertinent surgical history. Social History     Socioeconomic History    Marital status: UNKNOWN     Spouse name: Not on file    Number of children: Not on file    Years of education: Not on file    Highest education level: Not on file   Tobacco Use    Smoking status: Former Smoker    Smokeless tobacco: Never Used   Substance and Sexual Activity    Alcohol use: Yes     Alcohol/week: 0.8 standard drinks     Types: 1 Glasses of wine per week     Comment: SOCIALLY    Drug use: No    Sexual activity: Never     Family History   Problem Relation Age of Onset    Diabetes Mother     Hypertension Mother     Hypertension Father     Cancer Father      Current Outpatient Medications   Medication Sig Dispense Refill    cloNIDine HCL (CATAPRES) 0.2 mg tablet TAKE 1 TABLET BY MOUTH AT BEDTIME 90 Tab 0    famotidine (PEPCID) 40 mg tablet Take 1/2 (one-half) tablet by mouth twice daily 30 Tab 0    potassium chloride (KLOR-CON) 10 mEq tablet Take 2 Tabs by mouth daily. 60 Tab 12    naproxen (NAPROSYN) 500 mg tablet TAKE 1 TABLET BY MOUTH TWICE DAILY WITH MEALS 60 Tab 3    ipratropium (ATROVENT) 42 mcg (0.06 %) nasal spray 2 Sprays by Both Nostrils route four (4) times daily. 15 mL 3    amLODIPine (NORVASC) 10 mg tablet Take 1 tablet by mouth once daily 30 Tab 5    bumetanide (BUMEX) 1 mg tablet TAKE ONE TABLET BY MOUTH ONCE DAILY 30 Tab 12    hydroxychloroquine (PLAQUENIL) 200 mg tablet Take 2 Tabs by mouth daily. 60 Tab 3    losartan (COZAAR) 100 mg tablet Take 1 Tab by mouth daily. 30 Tab 12    famotidine (PEPCID) 20 mg tablet Take 1 Tab by mouth two (2) times a day. 60 Tab 3    baclofen (LIORESAL) 10 mg tablet Take 1 Tab by mouth three (3) times daily. 90 Tab 1    latanoprost (XALATAN) 0.005 % ophthalmic solution Administer 1 Drop to both eyes nightly.  cholecalciferol (VITAMIN D3) 1,000 unit cap Take 2,000 Units by mouth daily.       cyclobenzaprine (FLEXERIL) 10 mg tablet Take 1 Tab by mouth three (3) times daily as needed for Muscle Spasm(s). 20 Tab 0    fluticasone propionate (FLONASE) 50 mcg/actuation nasal spray 2 Sprays by Both Nostrils route daily. 1 Bottle 12    cyclobenzaprine (FLEXERIL) 10 mg tablet TAKE ONE TABLET BY MOUTH TWICE DAILY (Patient taking differently: TAKE ONE TABLET BY MOUTH TWICE DAILY as needed) 60 Tab 12     No Known Allergies    Objective:  Visit Vitals  BP (!) 150/90   Pulse 72   Temp 98.8 °F (37.1 °C) (Oral)   Resp 16   Ht 5' 10\" (1.778 m)   Wt 194 lb (88 kg)   BMI 27.84 kg/m²     Physical Exam:   General appearance - alert, well appearing, and in no distress  Mental status - alert, oriented to person, place, and time  EYE-KASEY, EOMI, corneas normal, no foreign bodies  ENT-ENT exam normal, no neck nodes or sinus tenderness  Nose - normal and patent, no erythema, discharge or polyps  Mouth - mucous membranes moist, pharynx normal without lesions  Neck - supple, no significant adenopathy   Chest - clear to auscultation, no wheezes, rales or rhonchi, symmetric air entry   Heart - normal rate, regular rhythm, normal S1, S2, no murmurs, rubs, clicks or gallops   Abdomen - soft, nontender, nondistended, no masses or organomegaly  Lymph- no adenopathy palpable  Ext-peripheral pulses normal, no pedal edema, no clubbing or cyanosis  Skin-Warm and dry. no hyperpigmentation, vitiligo, or suspicious lesions  Neuro -alert, oriented, normal speech, no focal findings or movement disorder noted  Neck-normal C-spine, no tenderness, full ROM without pain  Feet-no nail deformities or callus formation with good pulses noted      Results for orders placed or performed in visit on 11/18/20   AMB POC LIPID PROFILE   Result Value Ref Range    Cholesterol (POC) 170     Triglycerides (POC) 146     HDL Cholesterol (POC) 34     Non-HDL Cholesterol 136     LDL Cholesterol (POC) 107 MG/DL    TChol/HDL Ratio (POC) 5.0        Assessment/Plan:    ICD-10-CM ICD-9-CM    1.  Essential hypertension  I10 401.9 AMB POC LIPID PROFILE CBC W/O DIFF      METABOLIC PANEL, COMPREHENSIVE   2. Discoid lupus  L93.0 695.4      Orders Placed This Encounter    CBC W/O DIFF     Standing Status:   Future     Standing Expiration Date:       METABOLIC PANEL, COMPREHENSIVE     Standing Status:   Future     Standing Expiration Date:   2021    AMB POC LIPID PROFILE     call if any problems,Take 81mg aspirin daily  Patient Instructions   MyChart Activation    Thank you for requesting access to [facility]. Please follow the instructions below to securely access and download your online medical record. [facility] allows you to send messages to your doctor, view your test results, renew your prescriptions, schedule appointments, and more. How Do I Sign Up? 1. In your internet browser, go to [email or web address]  2. Click on the First Time User? Click Here link in the Sign In box. You will be redirect to the New Member Sign Up page. 3. Enter your [facility] Access Code exactly as it appears below. You will not need to use this code after youve completed the sign-up process. If you do not sign up before the expiration date, you must request a new code. [facility] Access Code: [email or web address]  Expires: 2021  1:26 PM (This is the date your [facility] access code will )    4. Enter the last four digits of your Social Security Number (xxxx) and Date of Birth (mm/dd/yyyy) as indicated and click Submit. You will be taken to the next sign-up page. 5. Create a [facility] ID. This will be your [facility] login ID and cannot be changed, so think of one that is secure and easy to remember. 6. Create a [facility] password. You can change your password at any time. 7. Enter your Password Reset Question and Answer. This can be used at a later time if you forget your password. 8. Enter your e-mail address. You will receive e-mail notification when new information is available in [address] 9. Click Sign Up.  You can now view and download portions of your medical record. 10. Click the Download Summary menu link to download a portable copy of your medical information. Additional Information    If you have questions, please visit the Frequently Asked Questions section of the [facility] website at [email or web address] Remember, [facility] is NOT to be used for urgent needs. For medical emergencies, dial 911. Follow-up and Dispositions    · Return in about 3 months (around 2/18/2021), or if symptoms worsen or fail to improve. I have reviewed with the patient details of the assessment and plan and all questions were answered. Relevent patient education was performed. The most recent lab findings were reviewed with the patient. An After Visit Summary was printed and given to the patient.

## 2020-11-18 NOTE — PROGRESS NOTES
1. Have you been to the ER, urgent care clinic since your last visit? Hospitalized since your last visit?no    2. Have you seen or consulted any other health care providers outside of the [address] [place] [address] since your last visit? Include any pap smears or colon screening. No    3 most recent PHQ Screens 8/13/2020   PHQ Not Done Medical Reason (indicate in comments)   Little interest or pleasure in doing things Not at all   Feeling down, depressed, irritable, or hopeless Not at all   Total Score PHQ 2 0     Chief Complaint   Patient presents with    Hypertension     Per Dr. Krueger Bowels.,  verbal order given for needed amb poc labs.

## 2020-11-19 PROCEDURE — 90686 IIV4 VACC NO PRSV 0.5 ML IM: CPT

## 2020-11-19 PROCEDURE — G0008 ADMIN INFLUENZA VIRUS VAC: HCPCS

## 2020-12-14 RX ORDER — FAMOTIDINE 40 MG/1
TABLET, FILM COATED ORAL
Qty: 30 TAB | Refills: 0 | Status: SHIPPED | OUTPATIENT
Start: 2020-12-14 | End: 2021-01-25

## 2021-01-25 RX ORDER — FAMOTIDINE 40 MG/1
TABLET, FILM COATED ORAL
Qty: 30 TAB | Refills: 0 | Status: SHIPPED | OUTPATIENT
Start: 2021-01-25 | End: 2021-02-17 | Stop reason: ALTCHOICE

## 2021-02-17 ENCOUNTER — OFFICE VISIT (OUTPATIENT)
Dept: INTERNAL MEDICINE CLINIC | Age: 61
End: 2021-02-17
Payer: MEDICARE

## 2021-02-17 VITALS
HEIGHT: 70 IN | WEIGHT: 190 LBS | BODY MASS INDEX: 27.2 KG/M2 | RESPIRATION RATE: 20 BRPM | DIASTOLIC BLOOD PRESSURE: 70 MMHG | TEMPERATURE: 98.4 F | HEART RATE: 72 BPM | SYSTOLIC BLOOD PRESSURE: 128 MMHG | OXYGEN SATURATION: 98 %

## 2021-02-17 DIAGNOSIS — L93.0 DISCOID LUPUS: ICD-10-CM

## 2021-02-17 DIAGNOSIS — K21.9 GASTROESOPHAGEAL REFLUX DISEASE WITHOUT ESOPHAGITIS: ICD-10-CM

## 2021-02-17 DIAGNOSIS — Z12.11 SCREENING FOR COLON CANCER: ICD-10-CM

## 2021-02-17 DIAGNOSIS — I10 ESSENTIAL HYPERTENSION: Primary | ICD-10-CM

## 2021-02-17 LAB
CHOLEST SERPL-MCNC: 192 MG/DL
HDLC SERPL-MCNC: 43 MG/DL
HDLC SERPL: 4.5 {RATIO} (ref 0–5)
LDLC SERPL CALC-MCNC: 120 MG/DL (ref 0–100)
LIPID PROFILE,FLP: ABNORMAL
TRIGL SERPL-MCNC: 145 MG/DL (ref ?–150)
VLDLC SERPL CALC-MCNC: 29 MG/DL

## 2021-02-17 PROCEDURE — G8427 DOCREV CUR MEDS BY ELIG CLIN: HCPCS | Performed by: INTERNAL MEDICINE

## 2021-02-17 PROCEDURE — 3017F COLORECTAL CA SCREEN DOC REV: CPT | Performed by: INTERNAL MEDICINE

## 2021-02-17 PROCEDURE — G8754 DIAS BP LESS 90: HCPCS | Performed by: INTERNAL MEDICINE

## 2021-02-17 PROCEDURE — G8752 SYS BP LESS 140: HCPCS | Performed by: INTERNAL MEDICINE

## 2021-02-17 PROCEDURE — G8510 SCR DEP NEG, NO PLAN REQD: HCPCS | Performed by: INTERNAL MEDICINE

## 2021-02-17 PROCEDURE — G8419 CALC BMI OUT NRM PARAM NOF/U: HCPCS | Performed by: INTERNAL MEDICINE

## 2021-02-17 PROCEDURE — 99214 OFFICE O/P EST MOD 30 MIN: CPT | Performed by: INTERNAL MEDICINE

## 2021-02-17 RX ORDER — FAMOTIDINE 40 MG/1
TABLET, FILM COATED ORAL
Qty: 90 TAB | Refills: 3 | Status: SHIPPED | OUTPATIENT
Start: 2021-02-17 | End: 2022-04-20 | Stop reason: SDUPTHER

## 2021-02-17 NOTE — PROGRESS NOTES
Lauren Bedoya is a 61 y.o. female and presents with Hypertension, Lupus, and Neuropathy  . Subjective:    Hypertension Review:  The patient has essential hypertension  Diet and Lifestyle: generally follows a  low sodium diet, exercises sporadically  Home BP Monitoring: is not measured at home. Pertinent ROS: taking medications as instructed, no medication side effects noted, no TIA's, no chest pain on exertion, no dyspnea on exertion, no swelling of ankles. GERD Review:   Patient has a history of gastroesophageal reflux with heartburn. Symptoms have been present for a few months. She denies dysphagia. She  has not lost weight. She denies melena, hematochezia, hematemesis, and coffee ground emesis. This has been associated with fullness after meals. She denies abdominal bloating and none. Medical therapy in the past has included proton pump inhibitor    She has a history of discoid lupus and is now off her lupus medication      Allergic Rhinitis  Patient presents for evaluation of allergic symptoms. Symptoms include nasal congestion, rhinorrhea, sneezing, eye itching, watery eyes. Precipitants haved included possible pollen. Review of Systems  Constitutional: negative for fevers, chills, anorexia and weight loss  Eyes:   negative for visual disturbance and irritation  ENT:   negative for tinnitus,sore throat,nasal congestion,ear pains. hoarseness  Respiratory:  negative for cough, hemoptysis, dyspnea,wheezing  CV:   negative for chest pain, palpitations, lower extremity edema  GI:   negative for nausea, vomiting, diarrhea, abdominal pain,melena  Endo:               negative for polyuria,polydipsia,polyphagia,heat intolerance  Genitourinary: negative for frequency, dysuria and hematuria  Integument:  negative for rash and pruritus  Hematologic:  negative for easy bruising and gum/nose bleeding  Musculoskel: negative for myalgias, arthralgias, back pain, muscle weakness, joint pain  Neurological:  negative for headaches, dizziness, vertigo, memory problems and gait   Behavl/Psych: negative for feelings of anxiety, depression, mood changes    Past Medical History:   Diagnosis Date    Anemia, unspecified 2011    Arthritis, Degenerative,Knee 2011    Degenerative Disc Disease 2011    Discoid lupus 2011    Essential hypertension, benign 2011    GERD, Gastro-Esophageal Reflux Disease 2011    Osteoarthritis 2011    Osteoartritis 2011    Tendonitis 2011    Xerosis 2011     History reviewed. No pertinent surgical history. Social History     Socioeconomic History    Marital status: UNKNOWN     Spouse name: Not on file    Number of children: Not on file    Years of education: Not on file    Highest education level: Not on file   Tobacco Use    Smoking status: Former Smoker    Smokeless tobacco: Never Used   Substance and Sexual Activity    Alcohol use: Yes     Alcohol/week: 0.8 standard drinks     Types: 1 Glasses of wine per week     Comment: SOCIALLY    Drug use: No    Sexual activity: Never     Family History   Problem Relation Age of Onset    Diabetes Mother     Hypertension Mother     Hypertension Father     Cancer Father      Current Outpatient Medications   Medication Sig Dispense Refill    famotidine (PEPCID) 40 mg tablet Si tab po daily 90 Tab 3    cloNIDine HCL (CATAPRES) 0.2 mg tablet TAKE 1 TABLET BY MOUTH AT BEDTIME 90 Tab 0    potassium chloride (KLOR-CON) 10 mEq tablet Take 2 Tabs by mouth daily. 60 Tab 12    naproxen (NAPROSYN) 500 mg tablet TAKE 1 TABLET BY MOUTH TWICE DAILY WITH MEALS 60 Tab 3    ipratropium (ATROVENT) 42 mcg (0.06 %) nasal spray 2 Sprays by Both Nostrils route four (4) times daily.  15 mL 3    amLODIPine (NORVASC) 10 mg tablet Take 1 tablet by mouth once daily 30 Tab 5    bumetanide (BUMEX) 1 mg tablet TAKE ONE TABLET BY MOUTH ONCE DAILY 30 Tab 12    losartan (COZAAR) 100 mg tablet Take 1 Tab by mouth daily. 30 Tab 12    baclofen (LIORESAL) 10 mg tablet Take 1 Tab by mouth three (3) times daily. 90 Tab 1    latanoprost (XALATAN) 0.005 % ophthalmic solution Administer 1 Drop to both eyes nightly.  cholecalciferol (VITAMIN D3) 1,000 unit cap Take 2,000 Units by mouth daily.  cyclobenzaprine (FLEXERIL) 10 mg tablet Take 1 Tab by mouth three (3) times daily as needed for Muscle Spasm(s). 20 Tab 0    fluticasone propionate (FLONASE) 50 mcg/actuation nasal spray 2 Sprays by Both Nostrils route daily. 1 Bottle 12    cyclobenzaprine (FLEXERIL) 10 mg tablet TAKE ONE TABLET BY MOUTH TWICE DAILY (Patient taking differently: TAKE ONE TABLET BY MOUTH TWICE DAILY as needed) 60 Tab 12    hydroxychloroquine (PLAQUENIL) 200 mg tablet Take 2 Tabs by mouth daily. 60 Tab 3     No Known Allergies    Objective:  Visit Vitals  /70 (BP 1 Location: Right arm, BP Patient Position: Sitting, BP Cuff Size: Adult)   Pulse 72   Temp 98.4 °F (36.9 °C) (Oral)   Resp 20   Ht 5' 10\" (1.778 m)   Wt 190 lb (86.2 kg)   SpO2 98%   BMI 27.26 kg/m²     Physical Exam:   General appearance - alert, well appearing, and in no distress  Mental status - alert, oriented to person, place, and time  EYE-KASEY, EOMI, corneas normal, no foreign bodies  ENT-ENT exam normal, no neck nodes or sinus tenderness  Nose - normal and patent, no erythema, discharge or polyps  Mouth - mucous membranes moist, pharynx normal without lesions  Neck - supple, no significant adenopathy   Chest - clear to auscultation, no wheezes, rales or rhonchi, symmetric air entry   Heart - normal rate, regular rhythm, normal S1, S2, no murmurs, rubs, clicks or gallops   Abdomen - soft, nontender, nondistended, no masses or organomegaly  Lymph- no adenopathy palpable  Ext-peripheral pulses normal, no pedal edema, no clubbing or cyanosis  Skin-Warm and dry.  no hyperpigmentation, vitiligo, or suspicious lesions  Neuro -alert, oriented, normal speech, no focal findings or movement disorder noted  Neck-normal C-spine, no tenderness, full ROM without pain  Feet-no nail deformities or callus formation with good pulses noted      Results for orders placed or performed in visit on 29/73/72   METABOLIC PANEL, COMPREHENSIVE   Result Value Ref Range    Sodium 141 136 - 145 mmol/L    Potassium 4.0 3.5 - 5.1 mmol/L    Chloride 107 97 - 108 mmol/L    CO2 32 21 - 32 mmol/L    Anion gap 2 (L) 5 - 15 mmol/L    Glucose 79 65 - 100 mg/dL    BUN 9 6 - 20 MG/DL    Creatinine 0.70 0.55 - 1.02 MG/DL    BUN/Creatinine ratio 13 12 - 20      GFR est AA >60 >60 ml/min/1.73m2    GFR est non-AA >60 >60 ml/min/1.73m2    Calcium 8.9 8.5 - 10.1 MG/DL    Bilirubin, total 0.5 0.2 - 1.0 MG/DL    ALT (SGPT) 20 12 - 78 U/L    AST (SGOT) 23 15 - 37 U/L    Alk. phosphatase 150 (H) 45 - 117 U/L    Protein, total 8.1 6.4 - 8.2 g/dL    Albumin 3.7 3.5 - 5.0 g/dL    Globulin 4.4 (H) 2.0 - 4.0 g/dL    A-G Ratio 0.8 (L) 1.1 - 2.2     CBC W/O DIFF   Result Value Ref Range    WBC 3.2 (L) 3.6 - 11.0 K/uL    RBC 3.82 3.80 - 5.20 M/uL    HGB 10.7 (L) 11.5 - 16.0 g/dL    HCT 33.9 (L) 35.0 - 47.0 %    MCV 88.7 80.0 - 99.0 FL    MCH 28.0 26.0 - 34.0 PG    MCHC 31.6 30.0 - 36.5 g/dL    RDW 14.8 (H) 11.5 - 14.5 %    PLATELET 256 (L) 449 - 400 K/uL    MPV 12.0 8.9 - 12.9 FL    NRBC 0.0 0  WBC    ABSOLUTE NRBC 0.00 0.00 - 0.01 K/uL   AMB POC LIPID PROFILE   Result Value Ref Range    Cholesterol (POC) 170     Triglycerides (POC) 146     HDL Cholesterol (POC) 34     Non-HDL Cholesterol 136     LDL Cholesterol (POC) 107 MG/DL    TChol/HDL Ratio (POC) 5.0        Assessment/Plan:    ICD-10-CM ICD-9-CM    1. Essential hypertension  I10 401.9 LIPID PANEL   2. Discoid lupus  L93.0 695.4    3. Gastroesophageal reflux disease without esophagitis  K21.9 530.81    4.  Screening for colon cancer  Z12.11 V76.51 OCCULT BLOOD IMMUNOASSAY,DIAGNOSTIC     Orders Placed This Encounter    LIPID PANEL     Standing Status:   Future Standing Expiration Date:   3/17/2021    OCCULT BLOOD IMMUNOASSAY,DIAGNOSTIC     Standing Status:   Future     Standing Expiration Date:   2022    famotidine (PEPCID) 40 mg tablet     Sig: Si tab po daily     Dispense:  90 Tab     Refill:  3     lose weight, increase physical activity, follow low fat diet, follow low salt diet, continue present plan, have labs drawn prior to [facility]  Patient Instructions   MyChart Activation    Thank you for requesting access to [facility]. Please follow the instructions below to securely access and download your online medical record. [facility] allows you to send messages to your doctor, view your test results, renew your prescriptions, schedule appointments, and more. How Do I Sign Up? 1. In your internet browser, go to [email or web address]  2. Click on the First Time User? Click Here link in the Sign In box. You will be redirect to the New Member Sign Up page. 3. Enter your [facility] Access Code exactly as it appears below. You will not need to use this code after youve completed the sign-up process. If you do not sign up before the expiration date, you must request a new code. [facility] Access Code: [email or web address]  Expires: 4/3/2021  2:29 PM (This is the date your [facility] access code will )    4. Enter the last four digits of your Social Security Number (xxxx) and Date of Birth (mm/dd/yyyy) as indicated and click Submit. You will be taken to the next sign-up page. 5. Create a [facility] ID. This will be your [facility] login ID and cannot be changed, so think of one that is secure and easy to remember. 6. Create a [facility] password. You can change your password at any time. 7. Enter your Password Reset Question and Answer. This can be used at a later time if you forget your password. 8. Enter your e-mail address. You will receive e-mail notification when new information is available in [address] 9. Click Sign Up.  You can now view and download portions of your medical record. 10. Click the Download Summary menu link to download a portable copy of your medical information. Additional Information    If you have questions, please visit the Frequently Asked Questions section of the [facility] website at [email or web address] Cable Telecommunications [email or web address] ([email or web address]). [email or web address] Remember, [facility] is NOT to be used for urgent needs. For medical emergencies, dial 911. Follow-up and Dispositions    · Return in about 3 months (around 5/17/2021), or if symptoms worsen or fail to improve. I have reviewed with the patient details of the assessment and plan and all questions were answered. Relevent patient education was performed    An After Visit Summary was printed and given to the patient.

## 2021-02-17 NOTE — PATIENT INSTRUCTIONS
Shop[employer]ially Activation Thank you for requesting access to Shop[employer]ially. Please follow the instructions below to securely access and download your online medical record. ShopSocially allows you to send messages to your doctor, view your test results, renew your prescriptions, schedule appointments, and more. How Do I Sign Up? 1. In your internet browser, go to [email or web address] 
2. Click on the First Time User? Click Here link in the Sign In box. You will be redirect to the New Member Sign Up page. 3. Enter your ShopSocially Access Code exactly as it appears below. You will not need to use this code after youve completed the sign-up process. If you do not sign up before the expiration date, you must request a new code. ShopSocially Access Code: [email or web address] Expires: 4/3/2021  2:29 PM (This is the date your ShopSocially access code will ) 4. Enter the last four digits of your Social Security Number (xxxx) and Date of Birth (mm/dd/yyyy) as indicated and click Submit. You will be taken to the next sign-up page. 5. Create a ShopSocially ID. This will be your ShopSocially login ID and cannot be changed, so think of one that is secure and easy to remember. 6. Create a ShopSocially password. You can change your password at any time. 7. Enter your Password Reset Question and Answer. This can be used at a later time if you forget your password. 8. Enter your e-mail address. You will receive e-mail notification when new information is available in [address] 9. Click Sign Up. You can now view and download portions of your medical record. 10. Click the Download Summary menu link to download a portable copy of your medical information. Additional Information If you have questions, please visit the Frequently Asked Questions section of the ShopSocially website at [email or web address]/[facility] Medicalhart/. Remember, ShopSocially is NOT to be used for urgent needs. For medical emergencies, dial 911.

## 2021-02-19 ENCOUNTER — TELEPHONE (OUTPATIENT)
Dept: INTERNAL MEDICINE CLINIC | Age: 61
End: 2021-02-19

## 2021-02-19 RX ORDER — LOSARTAN POTASSIUM 100 MG/1
TABLET ORAL
Qty: 90 TAB | Refills: 0 | Status: SHIPPED | OUTPATIENT
Start: 2021-02-19 | End: 2021-06-06 | Stop reason: SDUPTHER

## 2021-02-19 RX ORDER — LOSARTAN POTASSIUM 100 MG/1
TABLET ORAL
Qty: 90 TAB | Refills: 0 | Status: CANCELLED | OUTPATIENT
Start: 2021-02-19

## 2021-02-19 NOTE — TELEPHONE ENCOUNTER
Duplicate request - Cozaar 100 mg #90 was sent to [facility] on 02/18/2021.      Requested Prescriptions     Pending Prescriptions Disp Refills    losartan (COZAAR) 100 mg tablet 90 Tab 0     Sig: Take 1 tablet by mouth once daily

## 2021-02-19 NOTE — TELEPHONE ENCOUNTER
----- Message from Zenovia Pencil sent at 2/19/2021 11:51 AM EST -----  Regarding: FW: Dr. Reena Lyn    ----- Message -----  From: Rehan Weiner: 2/19/2021   9:44 AM EST  To: [facility] [place]  Subject: Dr. Darrell Hanks (if not patient):self      Relationship of caller (if not patient):self      Best contact number(s):[phone]      Name of medication and dosage if known:\"Losartan 100mg\"      Is patient out of this medication (yes/no):yes      Pharmacy name:[facility]    Pharmacy listed in chart? (yes/no): yes  Pharmacy phone [phone]      Details to clarify the request:Pt advised wanting to  medication tomorrow if possible.       Osbaldo Lozada

## 2021-03-22 RX ORDER — AMLODIPINE BESYLATE 10 MG/1
TABLET ORAL
Qty: 30 TAB | Refills: 5 | Status: SHIPPED | OUTPATIENT
Start: 2021-03-22 | End: 2021-12-19 | Stop reason: SDUPTHER

## 2021-06-06 RX ORDER — LOSARTAN POTASSIUM 100 MG/1
TABLET ORAL
Qty: 90 TABLET | Refills: 0 | Status: SHIPPED | OUTPATIENT
Start: 2021-06-06 | End: 2021-10-01

## 2021-06-22 ENCOUNTER — OFFICE VISIT (OUTPATIENT)
Dept: INTERNAL MEDICINE CLINIC | Age: 61
End: 2021-06-22
Payer: MEDICARE

## 2021-06-22 VITALS
HEIGHT: 70 IN | BODY MASS INDEX: 28.63 KG/M2 | RESPIRATION RATE: 16 BRPM | OXYGEN SATURATION: 98 % | SYSTOLIC BLOOD PRESSURE: 140 MMHG | HEART RATE: 86 BPM | TEMPERATURE: 98.8 F | WEIGHT: 200 LBS | DIASTOLIC BLOOD PRESSURE: 80 MMHG

## 2021-06-22 DIAGNOSIS — L93.0 DISCOID LUPUS: ICD-10-CM

## 2021-06-22 DIAGNOSIS — L20.9 ATOPIC DERMATITIS, UNSPECIFIED TYPE: ICD-10-CM

## 2021-06-22 DIAGNOSIS — R60.9 PERIPHERAL EDEMA: ICD-10-CM

## 2021-06-22 DIAGNOSIS — I10 ESSENTIAL HYPERTENSION: Primary | ICD-10-CM

## 2021-06-22 LAB
ALBUMIN SERPL-MCNC: 3.9 G/DL (ref 3.5–5)
ALBUMIN/GLOB SERPL: 0.9 {RATIO} (ref 1.1–2.2)
ALP SERPL-CCNC: 157 U/L (ref 45–117)
ALT SERPL-CCNC: 24 U/L (ref 12–78)
ANION GAP SERPL CALC-SCNC: 5 MMOL/L (ref 5–15)
AST SERPL-CCNC: 17 U/L (ref 15–37)
BILIRUB SERPL-MCNC: 0.5 MG/DL (ref 0.2–1)
BUN SERPL-MCNC: 10 MG/DL (ref 6–20)
BUN/CREAT SERPL: 15 (ref 12–20)
CALCIUM SERPL-MCNC: 9.5 MG/DL (ref 8.5–10.1)
CHLORIDE SERPL-SCNC: 107 MMOL/L (ref 97–108)
CHOLEST SERPL-MCNC: 181 MG/DL
CO2 SERPL-SCNC: 30 MMOL/L (ref 21–32)
CREAT SERPL-MCNC: 0.68 MG/DL (ref 0.55–1.02)
ERYTHROCYTE [DISTWIDTH] IN BLOOD BY AUTOMATED COUNT: 14.8 % (ref 11.5–14.5)
GLOBULIN SER CALC-MCNC: 4.5 G/DL (ref 2–4)
GLUCOSE SERPL-MCNC: 84 MG/DL (ref 65–100)
HCT VFR BLD AUTO: 35.6 % (ref 35–47)
HDLC SERPL-MCNC: 28 MG/DL
HGB BLD-MCNC: 11.2 G/DL (ref 11.5–16)
LDL CHOLESTEROL POC: 112 MG/DL
MCH RBC QN AUTO: 28.2 PG (ref 26–34)
MCHC RBC AUTO-ENTMCNC: 31.5 G/DL (ref 30–36.5)
MCV RBC AUTO: 89.7 FL (ref 80–99)
NON-HDL CHOLESTEROL, 011976: 153
NRBC # BLD: 0 K/UL (ref 0–0.01)
NRBC BLD-RTO: 0 PER 100 WBC
PLATELET # BLD AUTO: 150 K/UL (ref 150–400)
PMV BLD AUTO: 11.7 FL (ref 8.9–12.9)
POTASSIUM SERPL-SCNC: 4.6 MMOL/L (ref 3.5–5.1)
PROT SERPL-MCNC: 8.4 G/DL (ref 6.4–8.2)
RBC # BLD AUTO: 3.97 M/UL (ref 3.8–5.2)
SODIUM SERPL-SCNC: 142 MMOL/L (ref 136–145)
TCHOL/HDL RATIO (POC): 6.4
TRIGL SERPL-MCNC: 206 MG/DL
WBC # BLD AUTO: 3.4 K/UL (ref 3.6–11)

## 2021-06-22 PROCEDURE — G8419 CALC BMI OUT NRM PARAM NOF/U: HCPCS | Performed by: INTERNAL MEDICINE

## 2021-06-22 PROCEDURE — G8754 DIAS BP LESS 90: HCPCS | Performed by: INTERNAL MEDICINE

## 2021-06-22 PROCEDURE — 80061 LIPID PANEL: CPT | Performed by: INTERNAL MEDICINE

## 2021-06-22 PROCEDURE — G8427 DOCREV CUR MEDS BY ELIG CLIN: HCPCS | Performed by: INTERNAL MEDICINE

## 2021-06-22 PROCEDURE — 99214 OFFICE O/P EST MOD 30 MIN: CPT | Performed by: INTERNAL MEDICINE

## 2021-06-22 PROCEDURE — 3017F COLORECTAL CA SCREEN DOC REV: CPT | Performed by: INTERNAL MEDICINE

## 2021-06-22 PROCEDURE — G8753 SYS BP > OR = 140: HCPCS | Performed by: INTERNAL MEDICINE

## 2021-06-22 PROCEDURE — G8510 SCR DEP NEG, NO PLAN REQD: HCPCS | Performed by: INTERNAL MEDICINE

## 2021-06-22 RX ORDER — POTASSIUM CHLORIDE 750 MG/1
20 TABLET, EXTENDED RELEASE ORAL DAILY
Qty: 60 TABLET | Refills: 12 | Status: SHIPPED | OUTPATIENT
Start: 2021-06-22

## 2021-06-22 RX ORDER — TRIAMCINOLONE ACETONIDE 1 MG/G
OINTMENT TOPICAL 2 TIMES DAILY
Qty: 30 G | Refills: 3 | Status: SHIPPED | OUTPATIENT
Start: 2021-06-22 | End: 2021-07-20 | Stop reason: ALTCHOICE

## 2021-06-22 RX ORDER — BUMETANIDE 1 MG/1
TABLET ORAL
Qty: 30 TABLET | Refills: 12 | Status: SHIPPED | OUTPATIENT
Start: 2021-06-22 | End: 2022-08-12

## 2021-06-22 RX ORDER — CLONIDINE HYDROCHLORIDE 0.2 MG/1
TABLET ORAL
Qty: 90 TABLET | Refills: 0 | Status: SHIPPED | OUTPATIENT
Start: 2021-06-22 | End: 2022-04-03

## 2021-06-22 NOTE — PROGRESS NOTES
1. Have you been to the ER, urgent care clinic since your last visit? Hospitalized since your last visit?no    2. Have you seen or consulted any other health care providers outside of the [address] [place] [address] since your last visit? Include any pap smears or colon screening. No    Chief Complaint   Patient presents with    Hypertension         Per Dr. Kendy Landry.,  verbal order given for needed amb poc labs.   3 most recent PHQ Screens 6/22/2021   PHQ Not Done -   Little interest or pleasure in doing things Not at all   Feeling down, depressed, irritable, or hopeless Not at all   Total Score PHQ 2 0

## 2021-06-22 NOTE — PROGRESS NOTES
Zahira Mas is a 64 y.o. female and presents with Hypertension and Skin Problem  . Subjective:    Hypertension Review:  The patient has essential hypertension  Diet and Lifestyle: generally follows a  low sodium diet, exercises sporadically  Home BP Monitoring: is not measured at home. Pertinent ROS: taking medications as instructed, no medication side effects noted, no TIA's, no chest pain on exertion, no dyspnea on exertion, no swelling of ankles. GERD Review:   Patient has a history of gastroesophageal reflux with heartburn. Symptoms have been present for a few months. She denies dysphagia. She  has not lost weight. She denies melena, hematochezia, hematemesis, and coffee ground emesis. This has been associated with fullness after meals. She denies abdominal bloating and none. Medical therapy in the past has included proton pump inhibitor    She has a history of discoid lupus and is now off her lupus medication  She has a recurrent rash on her  arms    Allergic Rhinitis  Patient presents for evaluation of allergic symptoms. Symptoms include nasal congestion, rhinorrhea, sneezing, eye itching, watery eyes. Precipitants haved included possible pollen. She has some numbness of the lt. lower leg      Review of Systems  Constitutional: negative for fevers, chills, anorexia and weight loss  Eyes:   negative for visual disturbance and irritation  ENT:   negative for tinnitus,sore throat,nasal congestion,ear pains. hoarseness  Respiratory:  negative for cough, hemoptysis, dyspnea,wheezing  CV:   negative for chest pain, palpitations, lower extremity edema  GI:   negative for nausea, vomiting, diarrhea, abdominal pain,melena  Endo:               negative for polyuria,polydipsia,polyphagia,heat intolerance  Genitourinary: negative for frequency, dysuria and hematuria  Integument:  rash and pruritus  Hematologic:  negative for easy bruising and gum/nose bleeding  Musculoskel: negative for myalgias, arthralgias, back pain, muscle weakness, joint pain  Neurological:  negative for headaches, dizziness, vertigo, memory problems and gait   Behavl/Psych: negative for feelings of anxiety, depression, mood changes    Past Medical History:   Diagnosis Date    Anemia, unspecified 2/21/2011    Arthritis, Degenerative,Knee 2/21/2011    Degenerative Disc Disease 2/21/2011    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 No pertinent surgical history. Social History     Socioeconomic History    Marital status: UNKNOWN     Spouse name: Not on file    Number of children: Not on file    Years of education: Not on file    Highest education level: Not on file   Tobacco Use    Smoking status: Former Smoker    Smokeless tobacco: Never Used   Vaping Use    Vaping Use: Never used   Substance and Sexual Activity    Alcohol use: Yes     Alcohol/week: 0.8 standard drinks     Types: 1 Glasses of wine per week     Comment: SOCIALLY    Drug use: No    Sexual activity: Never     Social Determinants of Health     Financial Resource Strain:     Difficulty of Paying Living Expenses:    Food Insecurity:     Worried About Running Out of Food in the Last Year:     [address] in the Last Year:    Transportation Needs:     Lack of Transportation (Medical):      Lack of Transportation (Non-Medical):    Physical Activity:     Days of Exercise per Week:     Minutes of Exercise per Session:    Stress:     Feeling of Stress :    Social Connections:     Frequency of Communication with Friends and Family:     Frequency of Social Gatherings with Friends and Family:     Attends Voodoo Services:     Active Member of Clubs or Organizations:     Attends Club or Organization Meetings:     Marital Status:      Family History Problem Relation Age of Onset    Diabetes Mother     Hypertension Mother     Hypertension Father     Cancer Father      Current Outpatient Medications   Medication Sig Dispense Refill    cloNIDine HCL (CATAPRES) 0.2 mg tablet TAKE 1 TABLET BY MOUTH AT BEDTIME 90 Tablet 0    potassium chloride (KLOR-CON) 10 mEq tablet Take 2 Tablets by mouth daily. 60 Tablet 12    bumetanide (BUMEX) 1 mg tablet TAKE ONE TABLET BY MOUTH ONCE DAILY 30 Tablet 12    triamcinolone acetonide (KENALOG) 0.1 % ointment Apply  to affected area two (2) times a day. use thin layer 30 g 3    losartan (COZAAR) 100 mg tablet Take 1 tablet by mouth once daily 90 Tablet 0    amLODIPine (NORVASC) 10 mg tablet Take 1 tablet by mouth once daily 30 Tab 5    famotidine (PEPCID) 40 mg tablet Si tab po daily 90 Tab 3    naproxen (NAPROSYN) 500 mg tablet TAKE 1 TABLET BY MOUTH TWICE DAILY WITH MEALS 60 Tab 3    ipratropium (ATROVENT) 42 mcg (0.06 %) nasal spray 2 Sprays by Both Nostrils route four (4) times daily. 15 mL 3    baclofen (LIORESAL) 10 mg tablet Take 1 Tab by mouth three (3) times daily. 90 Tab 1    latanoprost (XALATAN) 0.005 % ophthalmic solution Administer 1 Drop to both eyes nightly.  cholecalciferol (VITAMIN D3) 1,000 unit cap Take 2,000 Units by mouth daily.  fluticasone propionate (FLONASE) 50 mcg/actuation nasal spray 2 Sprays by Both Nostrils route daily.  1 Bottle 12     No Known Allergies    Objective:  Visit Vitals  BP (!) 140/80   Pulse 86   Temp 98.8 °F (37.1 °C) (Oral)   Resp 16   Ht 5' 10\" (1.778 m)   Wt 200 lb (90.7 kg)   SpO2 98%   BMI 28.70 kg/m²     Physical Exam:   General appearance - alert, well appearing, and in no distress  Mental status - alert, oriented to person, place, and time  EYE-KASEY, EOMI, corneas normal, no foreign bodies  ENT-ENT exam normal, no neck nodes or sinus tenderness  Nose - normal and patent, no erythema, discharge or polyps  Mouth - mucous membranes moist, pharynx normal without lesions  Neck - supple, no significant adenopathy   Chest - clear to auscultation, no wheezes, rales or rhonchi, symmetric air entry   Heart - normal rate, regular rhythm, normal S1, S2, no murmurs, rubs, clicks or gallops   Abdomen - soft, nontender, nondistended, no masses or organomegaly  Lymph- no adenopathy palpable  Ext-peripheral pulses normal, no pedal edema, no clubbing or cyanosis  Skin-Warm and dry. no hyperpigmentation, vitiligo, or suspicious lesions  Neuro -alert, oriented, normal speech, no focal findings or movement disorder noted  Neck-normal C-spine, no tenderness, full ROM without pain  Feet-no nail deformities or callus formation with good pulses noted      Results for orders placed or performed in visit on 02/17/21   LIPID PANEL   Result Value Ref Range    LIPID PROFILE          Cholesterol, total 192 <200 MG/DL    Triglyceride 145 <150 MG/DL    HDL Cholesterol 43 MG/DL    LDL, calculated 120 (H) 0 - 100 MG/DL    VLDL, calculated 29 MG/DL    CHOL/HDL Ratio 4.5 0.0 - 5.0         Assessment/Plan:    ICD-10-CM ICD-9-CM    1. Essential hypertension  I10 401.9 AMB POC LIPID PROFILE      CBC W/O DIFF      METABOLIC PANEL, COMPREHENSIVE   2. Peripheral edema  R60.9 782.3 bumetanide (BUMEX) 1 mg tablet   3. Discoid lupus  L93.0 695.4    4. Atopic dermatitis, unspecified type  L20.9 691.8      Orders Placed This Encounter    CBC W/O DIFF     Standing Status:   Future     Standing Expiration Date:   9/56/8629    METABOLIC PANEL, COMPREHENSIVE     Standing Status:   Future     Standing Expiration Date:   6/22/2022    AMB POC LIPID PROFILE    cloNIDine HCL (CATAPRES) 0.2 mg tablet     Sig: TAKE 1 TABLET BY MOUTH AT BEDTIME     Dispense:  90 Tablet     Refill:  0    potassium chloride (KLOR-CON) 10 mEq tablet     Sig: Take 2 Tablets by mouth daily.      Dispense:  60 Tablet     Refill:  12    bumetanide (BUMEX) 1 mg tablet     Sig: TAKE ONE TABLET BY MOUTH ONCE DAILY     Dispense:  30 Tablet Refill:  12     Please consider 90 day supplies to promote better adherence    triamcinolone acetonide (KENALOG) 0.1 % ointment     Sig: Apply  to affected area two (2) times a day. use thin layer     Dispense:  30 g     Refill:  3     lose weight, increase physical activity, follow low fat diet, follow low salt diet, continue present plan, have labs drawn prior to [facility]  Patient Instructions   MyChart Activation    Thank you for requesting access to WellApps. Please follow the instructions below to securely access and download your online medical record. WellApps allows you to send messages to your doctor, view your test results, renew your prescriptions, schedule appointments, and more. How Do I Sign Up? 1. In your internet browser, go to [email or web address]  2. Click on the First Time User? Click Here link in the Sign In box. You will be redirect to the New Member Sign Up page. 3. Enter your WellApps Access Code exactly as it appears below. You will not need to use this code after youve completed the sign-up process. If you do not sign up before the expiration date, you must request a new code. WellApps Access Code: [email or web address]  Expires: 2021  2:38 PM (This is the date your WellApps access code will )    4. Enter the last four digits of your Social Security Number (xxxx) and Date of Birth (mm/dd/yyyy) as indicated and click Submit. You will be taken to the next sign-up page. 5. Create a WellApps ID. This will be your WellApps login ID and cannot be changed, so think of one that is secure and easy to remember. 6. Create a WellApps password. You can change your password at any time. 7. Enter your Password Reset Question and Answer. This can be used at a later time if you forget your password. 8. Enter your e-mail address. You will receive e-mail notification when new information is available in [address] 9. Click Sign Up.  You can now view and download portions of your medical record. 10. Click the Download Summary menu link to download a portable copy of your medical information. Additional Information    If you have questions, please visit the Frequently Asked Questions section of the [employer] website at [email or web address] Remember, [employer] is NOT to be used for urgent needs. For medical emergencies, dial 911. Follow-up and Dispositions    · Return in about 4 weeks (around 7/20/2021), or if symptoms worsen or fail to improve. I have reviewed with the patient details of the assessment and plan and all questions were answered. Relevent patient education was performed    An After Visit Summary was printed and given to the patient.

## 2021-06-22 NOTE — PATIENT INSTRUCTIONS
Kaeuferportal Activation    Thank you for requesting access to Kaeuferportal. Please follow the instructions below to securely access and download your online medical record. Kaeuferportal allows you to send messages to your doctor, view your test results, renew your prescriptions, schedule appointments, and more. How Do I Sign Up? 1. In your internet browser, go to [email or web address]  2. Click on the First Time User? Click Here link in the Sign In box. You will be redirect to the New Member Sign Up page. 3. Enter your Kaeuferportal Access Code exactly as it appears below. You will not need to use this code after youve completed the sign-up process. If you do not sign up before the expiration date, you must request a new code. Kaeuferportal Access Code: [email or web address]  Expires: 2021  2:38 PM (This is the date your Kaeuferportal access code will )    4. Enter the last four digits of your Social Security Number (xxxx) and Date of Birth (mm/dd/yyyy) as indicated and click Submit. You will be taken to the next sign-up page. 5. Create a Kaeuferportal ID. This will be your Kaeuferportal login ID and cannot be changed, so think of one that is secure and easy to remember. 6. Create a Kaeuferportal password. You can change your password at any time. 7. Enter your Password Reset Question and Answer. This can be used at a later time if you forget your password. 8. Enter your e-mail address. You will receive e-mail notification when new information is available in [address] 9. Click Sign Up. You can now view and download portions of your medical record. 10. Click the Download Summary menu link to download a portable copy of your medical information. Additional Information    If you have questions, please visit the Frequently Asked Questions section of the Kaeuferportal website at [email or web address] Remember, Kaeuferportal is NOT to be used for urgent needs. For medical emergencies, dial 911.

## 2021-07-20 ENCOUNTER — OFFICE VISIT (OUTPATIENT)
Dept: INTERNAL MEDICINE CLINIC | Age: 61
End: 2021-07-20
Payer: MEDICARE

## 2021-07-20 VITALS
HEART RATE: 74 BPM | OXYGEN SATURATION: 98 % | WEIGHT: 197 LBS | HEIGHT: 70 IN | DIASTOLIC BLOOD PRESSURE: 89 MMHG | BODY MASS INDEX: 28.2 KG/M2 | SYSTOLIC BLOOD PRESSURE: 160 MMHG | TEMPERATURE: 98.1 F

## 2021-07-20 DIAGNOSIS — L20.9 ATOPIC DERMATITIS, UNSPECIFIED TYPE: Primary | ICD-10-CM

## 2021-07-20 DIAGNOSIS — I10 ESSENTIAL HYPERTENSION: ICD-10-CM

## 2021-07-20 DIAGNOSIS — L93.0 DISCOID LUPUS: ICD-10-CM

## 2021-07-20 PROCEDURE — G8510 SCR DEP NEG, NO PLAN REQD: HCPCS | Performed by: INTERNAL MEDICINE

## 2021-07-20 PROCEDURE — G8754 DIAS BP LESS 90: HCPCS | Performed by: INTERNAL MEDICINE

## 2021-07-20 PROCEDURE — 99214 OFFICE O/P EST MOD 30 MIN: CPT | Performed by: INTERNAL MEDICINE

## 2021-07-20 PROCEDURE — G8427 DOCREV CUR MEDS BY ELIG CLIN: HCPCS | Performed by: INTERNAL MEDICINE

## 2021-07-20 PROCEDURE — 3017F COLORECTAL CA SCREEN DOC REV: CPT | Performed by: INTERNAL MEDICINE

## 2021-07-20 PROCEDURE — G8419 CALC BMI OUT NRM PARAM NOF/U: HCPCS | Performed by: INTERNAL MEDICINE

## 2021-07-20 PROCEDURE — G8753 SYS BP > OR = 140: HCPCS | Performed by: INTERNAL MEDICINE

## 2021-07-20 RX ORDER — CLOBETASOL PROPIONATE 0.5 MG/G
OINTMENT TOPICAL 2 TIMES DAILY
Qty: 45 G | Refills: 3 | Status: SHIPPED | OUTPATIENT
Start: 2021-07-20 | End: 2022-01-19 | Stop reason: SDUPTHER

## 2021-07-20 NOTE — PATIENT INSTRUCTIONS
Mistral Solutions Activation    Thank you for requesting access to [employer]. Please follow the instructions below to securely access and download your online medical record. [employer] Solutions allows you to send messages to your doctor, view your test results, renew your prescriptions, schedule appointments, and more. How Do I Sign Up? 1. In your internet browser, go to [email or web address]  2. Click on the First Time User? Click Here link in the Sign In box. You will be redirect to the New Member Sign Up page. 3. Enter your Mist[employer] Solutions Access Code exactly as it appears below. You will not need to use this code after youve completed the sign-up process. If you do not sign up before the expiration date, you must request a new code. [employer] Solutions Access Code: [email or web address]  Expires: 9/3/2021  1:57 PM (This is the date your [employer] Solutions access code will )    4. Enter the last four digits of your Social Security Number (xxxx) and Date of Birth (mm/dd/yyyy) as indicated and click Submit. You will be taken to the next sign-up page. 5. Create a Mistral Solutions ID. This will be your [employer] Solutions login ID and cannot be changed, so think of one that is secure and easy to remember. 6. Create a Mist[employer] Solutions password. You can change your password at any time. 7. Enter your Password Reset Question and Answer. This can be used at a later time if you forget your password. 8. Enter your e-mail address. You will receive e-mail notification when new information is available in [address] 9. Click Sign Up. You can now view and download portions of your medical record. 10. Click the Download Summary menu link to download a portable copy of your medical information. Additional Information    If you have questions, please visit the Frequently Asked Questions section of the Mistral Solutions website at [email or web address] Digital Vault/For Art's Sake Mediahart/. Remember, [employer]ral Solutions is NOT to be used for urgent needs. For medical emergencies, dial 911.

## 2021-07-20 NOTE — PROGRESS NOTES
Jenni Padron is a 64 y.o. female and presents with Follow-up  . Subjective:    Hypertension Review:  The patient has essential hypertension  Diet and Lifestyle: generally follows a  low sodium diet, exercises sporadically  Home BP Monitoring: is not measured at home. Pertinent ROS: taking medications as instructed, no medication side effects noted, no TIA's, no chest pain on exertion, no dyspnea on exertion, no swelling of ankles. GERD Review:   Patient has a history of gastroesophageal reflux with heartburn. Symptoms have been present for a few months. She denies dysphagia. She  has not lost weight. She denies melena, hematochezia, hematemesis, and coffee ground emesis. This has been associated with fullness after meals. She denies abdominal bloating and none. Medical therapy in the past has included proton pump inhibitor      She still has a recurrent rash on her  Arms  She states it is improved slowly    Allergic Rhinitis  Patient presents for evaluation of allergic symptoms. Symptoms include nasal congestion, rhinorrhea, sneezing, eye itching, watery eyes. Precipitants haved included possible pollen. She has some numbness of the lt. lower leg      Review of Systems  Constitutional: negative for fevers, chills, anorexia and weight loss  Eyes:   negative for visual disturbance and irritation  ENT:   negative for tinnitus,sore throat,nasal congestion,ear pains. hoarseness  Respiratory:  negative for cough, hemoptysis, dyspnea,wheezing  CV:   negative for chest pain, palpitations, lower extremity edema  GI:   negative for nausea, vomiting, diarrhea, abdominal pain,melena  Endo:               negative for polyuria,polydipsia,polyphagia,heat intolerance  Genitourinary: negative for frequency, dysuria and hematuria  Integument:  rash and pruritus  Hematologic:  negative for easy bruising and gum/nose bleeding  Musculoskel: negative for myalgias, arthralgias, back pain, muscle weakness, joint pain  Neurological:  negative for headaches, dizziness, vertigo, memory problems and gait   Behavl/Psych: negative for feelings of anxiety, depression, mood changes    Past Medical History:   Diagnosis Date    Anemia, unspecified 2/21/2011    Arthritis, Degenerative,Knee 2/21/2011    Degenerative Disc Disease 2/21/2011    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 No pertinent surgical history. Social History     Socioeconomic History    Marital status: UNKNOWN     Spouse name: Not on file    Number of children: Not on file    Years of education: Not on file    Highest education level: Not on file   Tobacco Use    Smoking status: Former Smoker    Smokeless tobacco: Never Used   Vaping Use    Vaping Use: Never used   Substance and Sexual Activity    Alcohol use: Yes     Alcohol/week: 0.8 standard drinks     Types: 1 Glasses of wine per week     Comment: SOCIALLY    Drug use: No    Sexual activity: Never     Social Determinants of Health     Financial Resource Strain:     Difficulty of Paying Living Expenses:    Food Insecurity:     Worried About Running Out of Food in the Last Year:     [address] in the Last Year:    Transportation Needs:     Lack of Transportation (Medical):      Lack of Transportation (Non-Medical):    Physical Activity:     Days of Exercise per Week:     Minutes of Exercise per Session:    Stress:     Feeling of Stress :    Social Connections:     Frequency of Communication with Friends and Family:     Frequency of Social Gatherings with Friends and Family:     Attends Tenri[employer] Services:     Active Member of Clubs or Organizations:     Attends Club or Organization Meetings:     Marital Status:      Family History   Problem Relation Age of Onset    Diabetes Mother     Hypertension Mother     Hypertension Father     Cancer Father      Current Outpatient Medications   Medication Sig Dispense Refill    clobetasoL (TEMOVATE) 0.05 % ointment Apply  to affected area two (2) times a day. 45 g 3    cloNIDine HCL (CATAPRES) 0.2 mg tablet TAKE 1 TABLET BY MOUTH AT BEDTIME 90 Tablet 0    potassium chloride (KLOR-CON) 10 mEq tablet Take 2 Tablets by mouth daily. 60 Tablet 12    bumetanide (BUMEX) 1 mg tablet TAKE ONE TABLET BY MOUTH ONCE DAILY 30 Tablet 12    losartan (COZAAR) 100 mg tablet Take 1 tablet by mouth once daily 90 Tablet 0    amLODIPine (NORVASC) 10 mg tablet Take 1 tablet by mouth once daily 30 Tab 5    famotidine (PEPCID) 40 mg tablet Si tab po daily 90 Tab 3    naproxen (NAPROSYN) 500 mg tablet TAKE 1 TABLET BY MOUTH TWICE DAILY WITH MEALS 60 Tab 3    ipratropium (ATROVENT) 42 mcg (0.06 %) nasal spray 2 Sprays by Both Nostrils route four (4) times daily. 15 mL 3    baclofen (LIORESAL) 10 mg tablet Take 1 Tab by mouth three (3) times daily. 90 Tab 1    cholecalciferol (VITAMIN D3) 1,000 unit cap Take 2,000 Units by mouth daily.  fluticasone propionate (FLONASE) 50 mcg/actuation nasal spray 2 Sprays by Both Nostrils route daily. 1 Bottle 12    latanoprost (XALATAN) 0.005 % ophthalmic solution Administer 1 Drop to both eyes nightly.        No Known Allergies    Objective:  Visit Vitals  BP (!) 160/89   Pulse 74   Temp 98.1 °F (36.7 °C)   Ht 5' 10\" (1.778 m)   Wt 197 lb (89.4 kg)   SpO2 98%   BMI 28.27 kg/m²     Physical Exam:   General appearance - alert, well appearing, and in no distress  Mental status - alert, oriented to person, place, and time  EYE-KASEY, EOMI, corneas normal, no foreign bodies  ENT-ENT exam normal, no neck nodes or sinus tenderness  Nose - normal and patent, no erythema, discharge or polyps  Mouth - mucous membranes moist, pharynx normal without lesions  Neck - supple, no significant adenopathy   Chest - clear to auscultation, no wheezes, rales or rhonchi, symmetric air entry   Heart - normal rate, regular rhythm, normal S1, S2, no murmurs, rubs, clicks or gallops   Abdomen - soft, nontender, nondistended, no masses or organomegaly  Lymph- no adenopathy palpable  Ext-peripheral pulses normal, no pedal edema, no clubbing or cyanosis  Skin-Maculopapular eruptions lt.upper arm  Neuro -alert, oriented, normal speech, no focal findings or movement disorder noted  Neck-normal C-spine, no tenderness, full ROM without pain  Feet-no nail deformities or callus formation with good pulses noted      Results for orders placed or performed in visit on 52/93/33   METABOLIC PANEL, COMPREHENSIVE   Result Value Ref Range    Sodium 142 136 - 145 mmol/L    Potassium 4.6 3.5 - 5.1 mmol/L    Chloride 107 97 - 108 mmol/L    CO2 30 21 - 32 mmol/L    Anion gap 5 5 - 15 mmol/L    Glucose 84 65 - 100 mg/dL    BUN 10 6 - 20 MG/DL    Creatinine 0.68 0.55 - 1.02 MG/DL    BUN/Creatinine ratio 15 12 - 20      GFR est AA >60 >60 ml/min/1.73m2    GFR est non-AA >60 >60 ml/min/1.73m2    Calcium 9.5 8.5 - 10.1 MG/DL    Bilirubin, total 0.5 0.2 - 1.0 MG/DL    ALT (SGPT) 24 12 - 78 U/L    AST (SGOT) 17 15 - 37 U/L    Alk.  phosphatase 157 (H) 45 - 117 U/L    Protein, total 8.4 (H) 6.4 - 8.2 g/dL    Albumin 3.9 3.5 - 5.0 g/dL    Globulin 4.5 (H) 2.0 - 4.0 g/dL    A-G Ratio 0.9 (L) 1.1 - 2.2     CBC W/O DIFF   Result Value Ref Range    WBC 3.4 (L) 3.6 - 11.0 K/uL    RBC 3.97 3.80 - 5.20 M/uL    HGB 11.2 (L) 11.5 - 16.0 g/dL    HCT 35.6 35.0 - 47.0 %    MCV 89.7 80.0 - 99.0 FL    MCH 28.2 26.0 - 34.0 PG    MCHC 31.5 30.0 - 36.5 g/dL    RDW 14.8 (H) 11.5 - 14.5 %    PLATELET 041 255 - 390 K/uL    MPV 11.7 8.9 - 12.9 FL    NRBC 0.0 0  WBC    ABSOLUTE NRBC 0.00 0.00 - 0.01 K/uL   AMB POC LIPID PROFILE   Result Value Ref Range    Cholesterol (POC) 181     Triglycerides (POC) 206     HDL Cholesterol (POC) 28     Non-HDL Cholesterol 153     LDL Cholesterol (POC) 112 MG/DL    TChol/HDL Ratio (POC) 6.4        Assessment/Plan:    ICD-10-CM ICD-9-CM    1. Atopic dermatitis, unspecified type  L20.9 691.8    2. Discoid lupus  L93.0 695.4    3. Essential hypertension  I10 401.9      Orders Placed This Encounter    clobetasoL (TEMOVATE) 0.05 % ointment     Sig: Apply  to affected area two (2) times a day. Dispense:  45 g     Refill:  3     lose weight, increase physical activity, follow low fat diet, follow low salt diet, continue present plan, have labs drawn prior to ROV  There are no Patient Instructions on file for this visit. I have reviewed with the patient details of the assessment and plan and all questions were answered. Relevent patient education was performed    An After Visit Summary was printed and given to the patient.

## 2021-08-25 DIAGNOSIS — M50.30 DEGENERATION OF CERVICAL INTERVERTEBRAL DISC: ICD-10-CM

## 2021-08-25 RX ORDER — NAPROXEN 500 MG/1
TABLET ORAL
Qty: 60 TABLET | Refills: 0 | Status: SHIPPED | OUTPATIENT
Start: 2021-08-25 | End: 2022-04-03

## 2021-10-01 RX ORDER — LOSARTAN POTASSIUM 100 MG/1
TABLET ORAL
Qty: 90 TABLET | Refills: 0 | Status: SHIPPED | OUTPATIENT
Start: 2021-10-01 | End: 2022-04-03

## 2021-10-19 ENCOUNTER — OFFICE VISIT (OUTPATIENT)
Dept: INTERNAL MEDICINE CLINIC | Age: 61
End: 2021-10-19
Payer: MEDICARE

## 2021-10-19 VITALS
SYSTOLIC BLOOD PRESSURE: 130 MMHG | DIASTOLIC BLOOD PRESSURE: 70 MMHG | HEART RATE: 90 BPM | RESPIRATION RATE: 20 BRPM | OXYGEN SATURATION: 96 % | WEIGHT: 197 LBS | BODY MASS INDEX: 28.2 KG/M2 | HEIGHT: 70 IN | TEMPERATURE: 98.8 F

## 2021-10-19 DIAGNOSIS — M54.16 LUMBAR RADICULOPATHY, ACUTE: ICD-10-CM

## 2021-10-19 DIAGNOSIS — Z00.00 MEDICARE ANNUAL WELLNESS VISIT, SUBSEQUENT: ICD-10-CM

## 2021-10-19 DIAGNOSIS — M50.30 DEGENERATION OF CERVICAL INTERVERTEBRAL DISC: Primary | ICD-10-CM

## 2021-10-19 DIAGNOSIS — L93.0 DISCOID LUPUS: ICD-10-CM

## 2021-10-19 DIAGNOSIS — I10 ESSENTIAL HYPERTENSION: ICD-10-CM

## 2021-10-19 DIAGNOSIS — M12.812 ROTATOR CUFF ARTHROPATHY OF LEFT SHOULDER: ICD-10-CM

## 2021-10-19 LAB
CHOLEST SERPL-MCNC: 169 MG/DL
HDLC SERPL-MCNC: 33 MG/DL
LDL CHOLESTEROL POC: 109 MG/DL
NON-HDL GOAL (POC): 136
TCHOL/HDL RATIO (POC): 5.2
TRIGL SERPL-MCNC: 133 MG/DL

## 2021-10-19 PROCEDURE — G8510 SCR DEP NEG, NO PLAN REQD: HCPCS | Performed by: INTERNAL MEDICINE

## 2021-10-19 PROCEDURE — 3017F COLORECTAL CA SCREEN DOC REV: CPT | Performed by: INTERNAL MEDICINE

## 2021-10-19 PROCEDURE — G8754 DIAS BP LESS 90: HCPCS | Performed by: INTERNAL MEDICINE

## 2021-10-19 PROCEDURE — G8752 SYS BP LESS 140: HCPCS | Performed by: INTERNAL MEDICINE

## 2021-10-19 PROCEDURE — G8427 DOCREV CUR MEDS BY ELIG CLIN: HCPCS | Performed by: INTERNAL MEDICINE

## 2021-10-19 PROCEDURE — 80061 LIPID PANEL: CPT | Performed by: INTERNAL MEDICINE

## 2021-10-19 PROCEDURE — G8419 CALC BMI OUT NRM PARAM NOF/U: HCPCS | Performed by: INTERNAL MEDICINE

## 2021-10-19 PROCEDURE — 99214 OFFICE O/P EST MOD 30 MIN: CPT | Performed by: INTERNAL MEDICINE

## 2021-10-19 PROCEDURE — G0439 PPPS, SUBSEQ VISIT: HCPCS | Performed by: INTERNAL MEDICINE

## 2021-10-19 RX ORDER — MYCOPHENOLATE MOFETIL 500 MG/1
TABLET ORAL
COMMUNITY
Start: 2021-08-10

## 2021-10-19 NOTE — PROGRESS NOTES
Chief Complaint   Patient presents with    Neck Pain    Hypertension    Shoulder Pain     3 most recent PHQ Screens 10/19/2021   PHQ Not Done -   Little interest or pleasure in doing things Not at all   Feeling down, depressed, irritable, or hopeless Not at all   Total Score PHQ 2 0     1. Have you been to the ER, urgent care clinic since your last visit? Hospitalized since your last visit?no    2. Have you seen or consulted any other health care providers outside of the [address] [place] [address] since your last visit? Include any pap smears or colon screening.  no

## 2021-10-19 NOTE — PROGRESS NOTES
Priscila Minaya is a 64 y.o. female and presents with Neck Pain, Hypertension, and Shoulder Pain  . Subjective:  Hypertension Review:  The patient has essential hypertension  Diet and Lifestyle: generally follows a  low sodium diet, exercises sporadically  Home BP Monitoring: is not measured at home. Pertinent ROS: taking medications as instructed, no medication side effects noted, no TIA's, no chest pain on exertion, no dyspnea on exertion, no swelling of ankles. Neck Pain Review:  Patient complains of neck pain. Onset of symptoms was months ago, gradually stablesince that time. Current symptoms are pain in neck (aching, dull in character; 6/10 in severity), weakness in back. Patient denies numbness, tingling, paresthesias in upper extremities. Patient denies weakness, diminished  strength, lack of coordination. Radiation of pain: . Patient has had no prior neck problems. Previous treatments include: medication: . Shoulder Pain Review:  Patient complains of left side shoulder pain. The symptoms began several weeks ago Course of symptoms since onset has been gradually worsening. Pain is described as overall severity = moderate. Symptoms were incited by no known event. Patient denies N/A. Therapy to date includes OTC analgesics: effective. Still has some neuropathy reported. Priscila Minaya is a 64 y.o. female and presents for annual Medicare Wellness Visit. Problem List: Reviewed with patient and discussed risk factors.     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 Empaneled Provider    PSH: Reviewed with patient  No past surgical history on file. SH: Reviewed with patient  Social History     Tobacco Use    Smoking status: Former Smoker    Smokeless tobacco: Never Used   Vaping Use    Vaping Use: Never used   Substance Use Topics    Alcohol use: Yes     Alcohol/week: 0.8 standard drinks     Types: 1 Glasses of wine per week     Comment: SOCIALLY    Drug use: No       FH: Reviewed with patient  Family History   Problem Relation Age of Onset    Diabetes Mother     Hypertension Mother     Hypertension Father     Cancer Father        Medications/Allergies: Reviewed with patient  Current Outpatient Medications on File Prior to Visit   Medication Sig Dispense Refill    mycophenolate (CELLCEPT) 500 mg tablet       losartan (COZAAR) 100 mg tablet Take 1 tablet by mouth once daily 90 Tablet 0    naproxen (NAPROSYN) 500 mg tablet TAKE 1 TABLET BY MOUTH TWICE DAILY WITH MEALS 60 Tablet 0    clobetasoL (TEMOVATE) 0.05 % ointment Apply  to affected area two (2) times a day. 45 g 3    cloNIDine HCL (CATAPRES) 0.2 mg tablet TAKE 1 TABLET BY MOUTH AT BEDTIME 90 Tablet 0    potassium chloride (KLOR-CON) 10 mEq tablet Take 2 Tablets by mouth daily. 60 Tablet 12    bumetanide (BUMEX) 1 mg tablet TAKE ONE TABLET BY MOUTH ONCE DAILY 30 Tablet 12    amLODIPine (NORVASC) 10 mg tablet Take 1 tablet by mouth once daily 30 Tab 5    famotidine (PEPCID) 40 mg tablet Si tab po daily 90 Tab 3    ipratropium (ATROVENT) 42 mcg (0.06 %) nasal spray 2 Sprays by Both Nostrils route four (4) times daily. 15 mL 3    latanoprost (XALATAN) 0.005 % ophthalmic solution Administer 1 Drop to both eyes nightly.  cholecalciferol (VITAMIN D3) 1,000 unit cap Take 2,000 Units by mouth daily.  fluticasone propionate (FLONASE) 50 mcg/actuation nasal spray 2 Sprays by Both Nostrils route daily.  1 Bottle 12    [DISCONTINUED] losartan (COZAAR) 100 mg tablet Take 1 tablet by mouth once daily 90 Tab 0    baclofen (LIORESAL) 10 mg tablet Take 1 Tab by mouth three (3) times daily. (Patient not taking: Reported on 10/19/2021) 90 Tab 1     No current facility-administered medications on file prior to visit. No Known Allergies    Objective:  Visit Vitals  /70 (BP 1 Location: Right arm, BP Patient Position: Sitting, BP Cuff Size: Adult)   Pulse 90   Temp 98.8 °F (37.1 °C)   Resp 20   Ht 5' 10\" (1.778 m)   Wt 197 lb (89.4 kg)   SpO2 96%   BMI 28.27 kg/m²    Body mass index is 28.27 kg/m². Assessment of cognitive impairment: Alert and oriented x 3    Depression Screen:   3 most recent PHQ Screens 10/19/2021   PHQ Not Done -   Little interest or pleasure in doing things Not at all   Feeling down, depressed, irritable, or hopeless Not at all   Total Score PHQ 2 0     Depression Review:  Patient is seen for screen of depression,denies anhedonia, weight gain, insomnia, hypersomnia, psychomotor agitation, psychomotor retardation, fatigue, feelings of worthlessness/guilt, difficulty concentrating, hopelessness, impaired memory and recurrent thoughts of death Treatment includes no medication   She denies recurrent thoughts of death and suicidal thoughts without plan. Fall Risk Assessment:    Fall Risk Assessment, last 12 mths 10/19/2021   Able to walk? Yes   Fall in past 12 months? 0   Do you feel unsteady? 0   Are you worried about falling 0       Functional Ability:   Does the patient exhibit a steady gait? yes   How long did it take the patient to get up and walk from a sitting position? seconds   Is the patient self reliant?  (ie can do own laundry, meals, household chores)  yes     Does the patient handle his/her own medications? yes     Does the patient handle his/her own money? yes     Is the patients home safe (ie good lighting, handrails on stairs and bath, etc.)? yes     Did you notice or did patient express any hearing difficulties? no     Did you notice or did patient express any vision difficulties?   no     Were distance and reading eye charts used? no       Advance Care Planning:   Patient was offered the opportunity to discuss advance care planning:  yes     Does patient have an Advance Directive:  no   If no, did you provide information on Caring Connections? yes       Plan:      Orders Placed This Encounter    CBC W/O DIFF    METABOLIC PANEL, COMPREHENSIVE    AMB POC LIPID PROFILE    mycophenolate (CELLCEPT) 500 mg tablet       Health Maintenance   Topic Date Due    Cervical cancer screen  Never done    Shingrix Vaccine Age 50> (1 of 2) Never done    Breast Cancer Screen Mammogram  11/13/2019    Flu Vaccine (1) 09/01/2021    Colorectal Cancer Screening Combo  01/05/2022    Medicare Yearly Exam  10/20/2022    DTaP/Tdap/Td series (2 - Td or Tdap) 11/17/2025    Lipid Screen  06/22/2026    COVID-19 Vaccine  Completed    Hepatitis C Screening  Addressed    Pneumococcal 0-64 years  Aged Out       *Patient verbalized understanding and agreement with the plan. A copy of the After Visit Summary with personalized health plan was given to the patient today. Review of Systems  Constitutional: negative for fevers, chills, anorexia and weight loss  Eyes:   negative for visual disturbance and irritation  ENT:   negative for tinnitus,sore throat,nasal congestion,ear pains. hoarseness  Respiratory:  negative for cough, hemoptysis, dyspnea,wheezing  CV:   negative for chest pain, palpitations, lower extremity edema  GI:   negative for nausea, vomiting, diarrhea, abdominal pain,melena  Endo:               negative for polyuria,polydipsia,polyphagia,heat intolerance  Genitourinary: negative for frequency, dysuria and hematuria  Integument:  negative for rash and pruritus  Hematologic:  negative for easy bruising and gum/nose bleeding  Musculoskel: negative for myalgias, arthralgias, back pain, muscle weakness, joint pain  Neurological: negative for headaches, dizziness, vertigo, memory problems and gait   Behavl/Psych: negative for feelings of anxiety, depression, mood changes    Past Medical History:   Diagnosis Date    Anemia, unspecified 2/21/2011    Arthritis, Degenerative,Knee 2/21/2011    Degenerative Disc Disease 2/21/2011    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 Social History     Socioeconomic History    Marital status: UNKNOWN     Spouse name: Not on file    Number of children: Not on file    Years of education: Not on file    Highest education level: Not on file   Tobacco Use    Smoking status: Former Smoker    Smokeless tobacco: Never Used   Vaping Use    Vaping Use: Never used   Substance and Sexual Activity    Alcohol use: Yes     Alcohol/week: 0.8 standard drinks     Types: 1 Glasses of wine per week     Comment: SOCIALLY    Drug use: No    Sexual activity: Never     Social Determinants of Health     Financial Resource Strain:     Difficulty of Paying Living Expenses:    Food Insecurity:     Worried About Running Out of Food in the Last Year:     [address] in the Last Year:    Transportation Needs:     Lack of Transportation (Medical):      Lack of Transportation (Non-Medical):    Physical Activity:     Days of Exercise per Week:     Minutes of Exercise per Session:    Stress:     Feeling of Stress :    Social Connections:     Frequency of Communication with Friends and Family:     Frequency of Social Gatherings with Friends and Family:     Attends Judaism Services:     Active Member of Clubs or Organizations:     Attends Club or Organization Meetings:     Marital Status:      Family History   Problem Relation Age of Onset    Diabetes Mother     Hypertension Mother    Zohreh Palomino Hypertension Father     Cancer Father      Current Outpatient Medications   Medication Sig Dispense Refill    mycophenolate (CELLCEPT) 500 mg tablet       losartan (COZAAR) 100 mg tablet Take 1 tablet by mouth once daily 90 Tablet 0    naproxen (NAPROSYN) 500 mg tablet TAKE 1 TABLET BY MOUTH TWICE DAILY WITH MEALS 60 Tablet 0    clobetasoL (TEMOVATE) 0.05 % ointment Apply  to affected area two (2) times a day. 45 g 3    cloNIDine HCL (CATAPRES) 0.2 mg tablet TAKE 1 TABLET BY MOUTH AT BEDTIME 90 Tablet 0    potassium chloride (KLOR-CON) 10 mEq tablet Take 2 Tablets by mouth daily. 60 Tablet 12    bumetanide (BUMEX) 1 mg tablet TAKE ONE TABLET BY MOUTH ONCE DAILY 30 Tablet 12    amLODIPine (NORVASC) 10 mg tablet Take 1 tablet by mouth once daily 30 Tab 5    famotidine (PEPCID) 40 mg tablet Si tab po daily 90 Tab 3    ipratropium (ATROVENT) 42 mcg (0.06 %) nasal spray 2 Sprays by Both Nostrils route four (4) times daily. 15 mL 3    latanoprost (XALATAN) 0.005 % ophthalmic solution Administer 1 Drop to both eyes nightly.  cholecalciferol (VITAMIN D3) 1,000 unit cap Take 2,000 Units by mouth daily.  fluticasone propionate (FLONASE) 50 mcg/actuation nasal spray 2 Sprays by Both Nostrils route daily. 1 Bottle 12    baclofen (LIORESAL) 10 mg tablet Take 1 Tab by mouth three (3) times daily.  (Patient not taking: Reported on 10/19/2021) 90 Tab 1     No Known Allergies    Objective:  Visit Vitals  /70 (BP 1 Location: Right arm, BP Patient Position: Sitting, BP Cuff Size: Adult)   Pulse 90   Temp 98.8 °F (37.1 °C)   Resp 20   Ht 5' 10\" (1.778 m)   Wt 197 lb (89.4 kg)   SpO2 96%   BMI 28.27 kg/m²     Physical Exam:   General appearance - alert, well appearing, and in no distress  Mental status - alert, oriented to person, place, and time  EYE-KASEY, EOMI, corneas normal, no foreign bodies  ENT-ENT exam normal, no neck nodes or sinus tenderness  Nose - normal and patent, no erythema, discharge or polyps  Mouth - mucous membranes moist, pharynx normal without lesions  Neck - supple, no significant adenopathy   Chest - clear to auscultation, no wheezes, rales or rhonchi, symmetric air entry   Heart - normal rate, regular rhythm, normal S1, S2, no murmurs, rubs, clicks or gallops   Abdomen - soft, nontender, nondistended, no masses or organomegaly  Lymph- no adenopathy palpable  Ext-peripheral pulses normal, no pedal edema, no clubbing or cyanosis  Skin-Warm and dry. no hyperpigmentation, vitiligo, or suspicious lesions  Neuro -alert, oriented, normal speech, no focal findings or movement disorder noted  Neck-normal C-spine, no tenderness, full ROM without pain  Feet-no nail deformities or callus formation with good pulses noted      Results for orders placed or performed in visit on 16/19/63   METABOLIC PANEL, COMPREHENSIVE   Result Value Ref Range    Sodium 142 136 - 145 mmol/L    Potassium 4.6 3.5 - 5.1 mmol/L    Chloride 107 97 - 108 mmol/L    CO2 30 21 - 32 mmol/L    Anion gap 5 5 - 15 mmol/L    Glucose 84 65 - 100 mg/dL    BUN 10 6 - 20 MG/DL    Creatinine 0.68 0.55 - 1.02 MG/DL    BUN/Creatinine ratio 15 12 - 20      GFR est AA >60 >60 ml/min/1.73m2    GFR est non-AA >60 >60 ml/min/1.73m2    Calcium 9.5 8.5 - 10.1 MG/DL    Bilirubin, total 0.5 0.2 - 1.0 MG/DL    ALT (SGPT) 24 12 - 78 U/L    AST (SGOT) 17 15 - 37 U/L    Alk.  phosphatase 157 (H) 45 - 117 U/L    Protein, total 8.4 (H) 6.4 - 8.2 g/dL    Albumin 3.9 3.5 - 5.0 g/dL    Globulin 4.5 (H) 2.0 - 4.0 g/dL    A-G Ratio 0.9 (L) 1.1 - 2.2     CBC W/O DIFF   Result Value Ref Range    WBC 3.4 (L) 3.6 - 11.0 K/uL    RBC 3.97 3.80 - 5.20 M/uL    HGB 11.2 (L) 11.5 - 16.0 g/dL    HCT 35.6 35.0 - 47.0 %    MCV 89.7 80.0 - 99.0 FL    MCH 28.2 26.0 - 34.0 PG    MCHC 31.5 30.0 - 36.5 g/dL    RDW 14.8 (H) 11.5 - 14.5 %    PLATELET 550 111 - 153 K/uL    MPV 11.7 8.9 - 12.9 FL    NRBC 0.0 0  WBC    ABSOLUTE NRBC 0.00 0.00 - 0.01 K/uL   AMB POC LIPID PROFILE Result Value Ref Range    Cholesterol (POC) 181     Triglycerides (POC) 206     HDL Cholesterol (POC) 28     Non-HDL Cholesterol 153     LDL Cholesterol (POC) 112 MG/DL    TChol/HDL Ratio (POC) 6.4        Assessment/Plan:    ICD-10-CM ICD-9-CM    1. Degeneration of cervical intervertebral disc  M50.30 722.4    2. Discoid lupus  L93.0 695.4    3. Essential hypertension  I10 401.9 AMB POC LIPID PROFILE      CBC W/O DIFF      METABOLIC PANEL, COMPREHENSIVE   4. Lumbar radiculopathy, acute  M54.16 724.4    5. Rotator cuff arthropathy of left shoulder  M12.812 716.81      Orders Placed This Encounter    CBC W/O DIFF     Standing Status:   Future     Standing Expiration Date:   63/84/3890    METABOLIC PANEL, COMPREHENSIVE     Standing Status:   Future     Standing Expiration Date:   10/19/2022    AMB POC LIPID PROFILE    mycophenolate (CELLCEPT) 500 mg tablet     lose weight, increase physical activity, follow low fat diet, follow low salt diet, continue present plan  There are no Patient Instructions on file for this visit. Follow-up and Dispositions    · Return in about 3 months (around 1/19/2022), or if symptoms worsen or fail to improve. I have reviewed with the patient details of the assessment and plan and all questions were answered. Relevent patient education was performed    An After Visit Summary was printed and given to the patient.

## 2021-10-20 LAB
ALBUMIN SERPL-MCNC: 3.6 G/DL (ref 3.5–5)
ALBUMIN/GLOB SERPL: 0.8 {RATIO} (ref 1.1–2.2)
ALP SERPL-CCNC: 147 U/L (ref 45–117)
ALT SERPL-CCNC: 22 U/L (ref 12–78)
ANION GAP SERPL CALC-SCNC: 5 MMOL/L (ref 5–15)
AST SERPL-CCNC: 21 U/L (ref 15–37)
BILIRUB SERPL-MCNC: 0.6 MG/DL (ref 0.2–1)
BUN SERPL-MCNC: 10 MG/DL (ref 6–20)
BUN/CREAT SERPL: 11 (ref 12–20)
CALCIUM SERPL-MCNC: 9.4 MG/DL (ref 8.5–10.1)
CHLORIDE SERPL-SCNC: 107 MMOL/L (ref 97–108)
CO2 SERPL-SCNC: 27 MMOL/L (ref 21–32)
CREAT SERPL-MCNC: 0.94 MG/DL (ref 0.55–1.02)
ERYTHROCYTE [DISTWIDTH] IN BLOOD BY AUTOMATED COUNT: 14.5 % (ref 11.5–14.5)
GLOBULIN SER CALC-MCNC: 4.8 G/DL (ref 2–4)
GLUCOSE SERPL-MCNC: 88 MG/DL (ref 65–100)
HCT VFR BLD AUTO: 34.5 % (ref 35–47)
HGB BLD-MCNC: 11 G/DL (ref 11.5–16)
MCH RBC QN AUTO: 28.6 PG (ref 26–34)
MCHC RBC AUTO-ENTMCNC: 31.9 G/DL (ref 30–36.5)
MCV RBC AUTO: 89.8 FL (ref 80–99)
NRBC # BLD: 0 K/UL (ref 0–0.01)
NRBC BLD-RTO: 0 PER 100 WBC
PLATELET # BLD AUTO: 152 K/UL (ref 150–400)
PMV BLD AUTO: 11.7 FL (ref 8.9–12.9)
POTASSIUM SERPL-SCNC: 3.8 MMOL/L (ref 3.5–5.1)
PROT SERPL-MCNC: 8.4 G/DL (ref 6.4–8.2)
RBC # BLD AUTO: 3.84 M/UL (ref 3.8–5.2)
SODIUM SERPL-SCNC: 139 MMOL/L (ref 136–145)
WBC # BLD AUTO: 3.4 K/UL (ref 3.6–11)

## 2021-11-09 ENCOUNTER — APPOINTMENT (OUTPATIENT)
Dept: GENERAL RADIOLOGY | Age: 61
End: 2021-11-09
Attending: PHYSICIAN ASSISTANT
Payer: MEDICARE

## 2021-11-09 ENCOUNTER — HOSPITAL ENCOUNTER (EMERGENCY)
Age: 61
Discharge: HOME | End: 2021-11-09
Attending: EMERGENCY MEDICINE
Payer: MEDICARE

## 2021-11-09 VITALS
HEART RATE: 76 BPM | SYSTOLIC BLOOD PRESSURE: 169 MMHG | OXYGEN SATURATION: 100 % | WEIGHT: 198.85 LBS | RESPIRATION RATE: 14 BRPM | BODY MASS INDEX: 28.47 KG/M2 | DIASTOLIC BLOOD PRESSURE: 82 MMHG | TEMPERATURE: 97.8 F | HEIGHT: 70 IN

## 2021-11-09 DIAGNOSIS — M19.012 ARTHRITIS OF LEFT SHOULDER REGION: Primary | ICD-10-CM

## 2021-11-09 LAB
ALBUMIN SERPL-MCNC: 3.2 G/DL (ref 3.5–5)
ALBUMIN/GLOB SERPL: 0.6 {RATIO} (ref 1.1–2.2)
ALP SERPL-CCNC: 133 U/L (ref 45–117)
ALT SERPL-CCNC: 23 U/L (ref 12–78)
ANION GAP SERPL CALC-SCNC: 5 MMOL/L (ref 5–15)
AST SERPL-CCNC: 22 U/L (ref 15–37)
BASOPHILS # BLD: 0 K/UL (ref 0–0.1)
BASOPHILS NFR BLD: 1 % (ref 0–1)
BILIRUB SERPL-MCNC: 0.5 MG/DL (ref 0.2–1)
BUN SERPL-MCNC: 12 MG/DL (ref 6–20)
BUN/CREAT SERPL: 16 (ref 12–20)
CALCIUM SERPL-MCNC: 9.3 MG/DL (ref 8.5–10.1)
CHLORIDE SERPL-SCNC: 106 MMOL/L (ref 97–108)
CO2 SERPL-SCNC: 29 MMOL/L (ref 21–32)
CREAT SERPL-MCNC: 0.77 MG/DL (ref 0.55–1.02)
DIFFERENTIAL METHOD BLD: ABNORMAL
EOSINOPHIL # BLD: 0 K/UL (ref 0–0.4)
EOSINOPHIL NFR BLD: 1 % (ref 0–7)
ERYTHROCYTE [DISTWIDTH] IN BLOOD BY AUTOMATED COUNT: 14.6 % (ref 11.5–14.5)
GLOBULIN SER CALC-MCNC: 5.4 G/DL (ref 2–4)
GLUCOSE SERPL-MCNC: 99 MG/DL (ref 65–100)
HCT VFR BLD AUTO: 34.1 % (ref 35–47)
HGB BLD-MCNC: 11 G/DL (ref 11.5–16)
IMM GRANULOCYTES # BLD AUTO: 0 K/UL (ref 0–0.04)
IMM GRANULOCYTES NFR BLD AUTO: 0 % (ref 0–0.5)
LYMPHOCYTES # BLD: 1.1 K/UL (ref 0.8–3.5)
LYMPHOCYTES NFR BLD: 40 % (ref 12–49)
MCH RBC QN AUTO: 28.4 PG (ref 26–34)
MCHC RBC AUTO-ENTMCNC: 32.3 G/DL (ref 30–36.5)
MCV RBC AUTO: 88.1 FL (ref 80–99)
MONOCYTES # BLD: 0.3 K/UL (ref 0–1)
MONOCYTES NFR BLD: 9 % (ref 5–13)
NEUTS SEG # BLD: 1.4 K/UL (ref 1.8–8)
NEUTS SEG NFR BLD: 48 % (ref 32–75)
NRBC # BLD: 0 K/UL (ref 0–0.01)
NRBC BLD-RTO: 0 PER 100 WBC
PLATELET # BLD AUTO: 137 K/UL (ref 150–400)
PMV BLD AUTO: 10.6 FL (ref 8.9–12.9)
POTASSIUM SERPL-SCNC: 3.9 MMOL/L (ref 3.5–5.1)
PROT SERPL-MCNC: 8.6 G/DL (ref 6.4–8.2)
RBC # BLD AUTO: 3.87 M/UL (ref 3.8–5.2)
SODIUM SERPL-SCNC: 140 MMOL/L (ref 136–145)
TROPONIN-HIGH SENSITIVITY: 5 NG/L (ref 0–51)
WBC # BLD AUTO: 2.8 K/UL (ref 3.6–11)

## 2021-11-09 PROCEDURE — 85025 COMPLETE CBC W/AUTO DIFF WBC: CPT

## 2021-11-09 PROCEDURE — 93005 ELECTROCARDIOGRAM TRACING: CPT

## 2021-11-09 PROCEDURE — 73030 X-RAY EXAM OF SHOULDER: CPT

## 2021-11-09 PROCEDURE — 84484 ASSAY OF TROPONIN QUANT: CPT

## 2021-11-09 PROCEDURE — 99283 EMERGENCY DEPT VISIT LOW MDM: CPT

## 2021-11-09 PROCEDURE — 36415 COLL VENOUS BLD VENIPUNCTURE: CPT

## 2021-11-09 PROCEDURE — 74011250637 HC RX REV CODE- 250/637: Performed by: PHYSICIAN ASSISTANT

## 2021-11-09 PROCEDURE — 80053 COMPREHEN METABOLIC PANEL: CPT

## 2021-11-09 RX ORDER — ACETAMINOPHEN 500 MG
1000 TABLET ORAL
Status: COMPLETED | OUTPATIENT
Start: 2021-11-09 | End: 2021-11-09

## 2021-11-09 RX ADMIN — ACETAMINOPHEN 1000 MG: 500 TABLET ORAL at 11:06

## 2021-11-09 NOTE — Clinical Note
[address]  [facility] EMERGENCY DEPT  [address] [facility] [address] [phone]    Work/School Note    Date: 11/9/2021    To Whom It May concern:    Fay Jernigan was seen and treated today in the emergency room by the following provider(s):  Attending Provider: Rich Davila MD  Physician Assistant: Lul Pineda. Fay Jernigan is excused from work/school on 11/09/21 and 11/10/21. She is medically clear to return to work/school on 11/11/2021.        Sincerely,          Thelda Cabot, PA

## 2021-11-09 NOTE — ED PROVIDER NOTES
EMERGENCY DEPARTMENT HISTORY AND PHYSICAL EXAM      Date: 2021  Patient Name: Tracy Rasmussen    History of Presenting Illness     HPI: Tracy Rasmussen is a 64 y.o. female with past medical history of lupus, hypertension presents for left shoulder pain for the past week. She says she has had this pain for years but over the past week it significantly worsened. She says that she was vacuuming when it got worse. She reports it is a throbbing pain that is better with massaging, nonradiating, 8 out of 10, intermittent, nonexertional.  She denies nausea vomiting, diaphoresis, lightheadedness, syncope, chest pain, shortness of breath, focal weakness or numbness, recent trauma, neck pain, fever. PCP: Cailin Gold MD    Current Outpatient Medications   Medication Sig Dispense Refill    mycophenolate (CELLCEPT) 500 mg tablet       losartan (COZAAR) 100 mg tablet Take 1 tablet by mouth once daily 90 Tablet 0    naproxen (NAPROSYN) 500 mg tablet TAKE 1 TABLET BY MOUTH TWICE DAILY WITH MEALS 60 Tablet 0    clobetasoL (TEMOVATE) 0.05 % ointment Apply  to affected area two (2) times a day. 45 g 3    cloNIDine HCL (CATAPRES) 0.2 mg tablet TAKE 1 TABLET BY MOUTH AT BEDTIME 90 Tablet 0    potassium chloride (KLOR-CON) 10 mEq tablet Take 2 Tablets by mouth daily. 60 Tablet 12    bumetanide (BUMEX) 1 mg tablet TAKE ONE TABLET BY MOUTH ONCE DAILY 30 Tablet 12    amLODIPine (NORVASC) 10 mg tablet Take 1 tablet by mouth once daily 30 Tab 5    famotidine (PEPCID) 40 mg tablet Si tab po daily 90 Tab 3    ipratropium (ATROVENT) 42 mcg (0.06 %) nasal spray 2 Sprays by Both Nostrils route four (4) times daily. 15 mL 3    baclofen (LIORESAL) 10 mg tablet Take 1 Tab by mouth three (3) times daily. (Patient not taking: Reported on 10/19/2021) 90 Tab 1    latanoprost (XALATAN) 0.005 % ophthalmic solution Administer 1 Drop to both eyes nightly.       cholecalciferol (VITAMIN D3) 1,000 unit cap Take 2,000 Units by mouth daily.  fluticasone propionate (FLONASE) 50 mcg/actuation nasal spray 2 Sprays by Both Nostrils route daily. 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 Family History:  Family History   Problem Relation Age of Onset    Diabetes Mother     Hypertension Mother     Hypertension Father     Cancer Father        Social History:  Social History     Tobacco Use    Smoking status: Former Smoker    Smokeless tobacco: Never Used   Vaping Use    Vaping Use: Never used   Substance Use Topics    Alcohol use: Yes     Alcohol/week: 0.8 standard drinks     Types: 1 Glasses of wine per week     Comment: SOCIALLY    Drug use: No       Allergies:  No Known Allergies      Review of Systems   Review of Systems   Constitutional: Negative for chills and fever. Respiratory: Negative for shortness of breath. Cardiovascular: Negative for chest pain. Gastrointestinal: Negative for abdominal pain, nausea and vomiting. Musculoskeletal: Positive for arthralgias. Negative for neck pain. Skin: Negative for rash. Neurological: Negative for weakness, light-headedness, numbness and headaches. Physical Exam     Vitals:    11/09/21 0951 11/09/21 1055   BP: (!) 204/96 (!) 169/82   Pulse: 91 76   Resp: 14    Temp: 97.8 °F (36.6 °C)    SpO2: 100%    Weight: 90.2 kg (198 lb 13.7 oz)    Height: 5' 10\" (1.778 m)      Physical Exam  Vitals and nursing note reviewed. Constitutional:       General: She is not in acute distress.      Appearance: She is well-developed. She is not diaphoretic. HENT:      Head: Normocephalic and atraumatic. Cardiovascular:      Rate and Rhythm: Normal rate and regular rhythm. Heart sounds: Normal heart sounds. Pulmonary:      Effort: Pulmonary effort is normal.      Breath sounds: Normal breath sounds. Musculoskeletal:      Comments: Musculoskeletal:  -Spurling's test, normal gait, no midline or paraspinal tenderness, no obvious trauma or rash, not warm to touch, no erythema or crepitus, no incontinence noted, patient is able to abduct shoulder, flex and extend elbow, pronate and supinate forearm, flex and extend wrist, flex and extend all digits, able to abduct and adduct all digits, normal reflexes at biceps, triceps, and brachioradialis     Left shoulder: No tenderness, patient has full range of motion without pain, normal  strengths bilaterally, arm does not appear swollen when compared to other arm, sensation is intact to light touch intact     Skin:     General: Skin is warm and dry. Neurological:      Mental Status: She is alert and oriented to person, place, and time. Psychiatric:         Behavior: Behavior normal.         Thought Content: Thought content normal.         Judgment: Judgment normal.           Diagnostic Study Results     Labs -     Recent Results (from the past 12 hour(s))   METABOLIC PANEL, COMPREHENSIVE    Collection Time: 11/09/21 11:22 AM   Result Value Ref Range    Sodium 140 136 - 145 mmol/L    Potassium 3.9 3.5 - 5.1 mmol/L    Chloride 106 97 - 108 mmol/L    CO2 29 21 - 32 mmol/L    Anion gap 5 5 - 15 mmol/L    Glucose 99 65 - 100 mg/dL    BUN 12 6 - 20 MG/DL    Creatinine 0.77 0.55 - 1.02 MG/DL    BUN/Creatinine ratio 16 12 - 20      GFR est AA >60 >60 ml/min/1.73m2    GFR est non-AA >60 >60 ml/min/1.73m2    Calcium 9.3 8.5 - 10.1 MG/DL    Bilirubin, total 0.5 0.2 - 1.0 MG/DL    ALT (SGPT) 23 12 - 78 U/L    AST (SGOT) 22 15 - 37 U/L    Alk.  phosphatase 133 (H) 45 - 117 U/L Protein, total 8.6 (H) 6.4 - 8.2 g/dL    Albumin 3.2 (L) 3.5 - 5.0 g/dL    Globulin 5.4 (H) 2.0 - 4.0 g/dL    A-G Ratio 0.6 (L) 1.1 - 2.2     CBC WITH AUTOMATED DIFF    Collection Time: 11/09/21 11:22 AM   Result Value Ref Range    WBC 2.8 (L) 3.6 - 11.0 K/uL    RBC 3.87 3.80 - 5.20 M/uL    HGB 11.0 (L) 11.5 - 16.0 g/dL    HCT 34.1 (L) 35.0 - 47.0 %    MCV 88.1 80.0 - 99.0 FL    MCH 28.4 26.0 - 34.0 PG    MCHC 32.3 30.0 - 36.5 g/dL    RDW 14.6 (H) 11.5 - 14.5 %    PLATELET 031 (L) 363 - 400 K/uL    MPV 10.6 8.9 - 12.9 FL    NRBC 0.0 0  WBC    ABSOLUTE NRBC 0.00 0.00 - 0.01 K/uL    NEUTROPHILS 48 32 - 75 %    LYMPHOCYTES 40 12 - 49 %    MONOCYTES 9 5 - 13 %    EOSINOPHILS 1 0 - 7 %    BASOPHILS 1 0 - 1 %    IMMATURE GRANULOCYTES 0 0.0 - 0.5 %    ABS. NEUTROPHILS 1.4 (L) 1.8 - 8.0 K/UL    ABS. LYMPHOCYTES 1.1 0.8 - 3.5 K/UL    ABS. MONOCYTES 0.3 0.0 - 1.0 K/UL    ABS. EOSINOPHILS 0.0 0.0 - 0.4 K/UL    ABS. BASOPHILS 0.0 0.0 - 0.1 K/UL    ABS. IMM. GRANS. 0.0 0.00 - 0.04 K/UL    DF AUTOMATED     TROPONIN-HIGH SENSITIVITY    Collection Time: 11/09/21 11:22 AM   Result Value Ref Range    Troponin-High Sensitivity 5 0 - 51 ng/L       Radiologic Studies -   XR SHOULDER LT AP/LAT MIN 2 V   Final Result   Mild degenerative changes. No acute abnormality. CT Results  (Last 48 hours)    None                Medical Decision Making   I am the first provider for this patient. I reviewed the vital signs, available nursing notes, past medical history, past surgical history, social history, and previous EKG    ED Course and Progress notes:   Initial assessment performed. The patients presenting problems have been discussed, and they are in agreement with the care plan formulated and outlined with them. I have encouraged them to ask questions as they arise throughout their visit.          Patient was reevaluated several times during their stay and there were no worsening symptoms, on the last re evaluation pt is resting comfortably, and has no new complaints, changes, or physical findings. The patient has improved and is stable. EKG:  Time: 0955  Rate: 80 bpm  AK interval: 182 ms  QRS duration: 82 ms  QT/QTC: 370/426 ms  P-R-T axes: 50 7 27  Interpreted by: Candy Rodriguez PA-C and attending Physician  The ECG interpretation was done contemporaneously by me. This is an adequate tracing. There is no acute ischemia; the rhythm is normal sinus; AK does not demonstrate AV heart block; QRS does not demonstrate a bundle branch block; ST and T waves do not show any ST elevation to suggest acute MI; agree with computer interpretation. Procedures:  Procedures    Critical Care Time: none    Vital Signs-Reviewed the patient's vital signs. Vitals:    11/09/21 0951 11/09/21 1055   BP: (!) 204/96 (!) 169/82   BP 1 Location: Left upper arm Right upper arm   BP Patient Position: At rest At rest   Pulse: 91 76   Temp: 97.8 °F (36.6 °C)    Resp: 14    Height: 5' 10\" (1.778 m)    Weight: 90.2 kg (198 lb 13.7 oz)    SpO2: 100%        Medications Administered During ED Course  Medications   acetaminophen (TYLENOL) tablet 1,000 mg (1,000 mg Oral Given 11/9/21 1106)       HYPERTENSION COUNSELING  Patient denies chest pain, headache, shortness of breath,  sx's, abd pain. Patient is made aware of their elevated blood pressure and is instructed to follow up this week with their Primary Care for a recheck. Patient is counseled regarding consequences of chronic, uncontrolled hypertension including kidney disease, heart disease, stroke or even death. Patient states their understanding and agrees to follow up this week. Disposition:  D/c home    DISCHARGE NOTE:   The patient was counseled on diagnosis and care plan. All available lab and imaging results have been reviewed and were discussed with the patient, including all incidental findings.  The likelihood of other entities in the differential is insufficient to justify any further testing for them. This was explained to the patient. Patient agrees with plan and agrees to follow up with PCP as recommended, or return to the ED immediately if their symptoms worsen. All medications were reviewed with the patient. All of pt's questions and concerns were addressed. The patient was advised that new or worsening symptoms would require further evaluation and should prompt immediate return to the Emergency Department. Discharge instructions have been provided and explained to the patient, along with reasons to return to the ED. Patient voices understanding and is agreeable with the plan for discharge. Patient is ready to go home. Follow-up Information     Follow up With Specialties Details Why Contact Info    Rita Bruno MD Internal Medicine Schedule an appointment as soon as possible for a visit   [facility]  [phone]      [facility] EMERGENCY DEPT Emergency Medicine Go to  If symptoms worsen [address]  [address] [facility]  [phone]    [facility]  Schedule an appointment as soon as possible for a visit   [facility]  [address]  [address] [facility]  [phone]          Discharge Medication List as of 11/9/2021 12:42 PM          Provider Notes (Medical Decision Making):   71-year-old female with history of hypertension and lupus presents for left shoulder pain. She describes it as acute on chronic pain. Initially her blood pressure was 204/96. She had a normal EKG in triage. Her musculoskeletal exam is unrevealing. Her x-ray shows mild osteoarthritis, the mediastinum does not appear widened from what was imaged. I reviewed the case with Dr. Griffin Chong and he went and examined the patient as well. Patient's lab work-up was unrevealing and her high-sensitivity troponin was 5. She has a normal EKG. Her x-ray shows osteoarthritis. I advised her to follow-up with orthopedics for her shoulder pain and she agreed.   Return to the emergency room for uncontrolled pain, fever, chest pain. Differential diagnosis: Osteoarthritis, rotator cuff impingement or tear, biceps tendinitis, neck disorder, low concern for AMI, septic joint    Diagnosis     Clinical Impression:   1. Arthritis of left shoulder region        Please note that this dictation was completed with [employer], the computer voice recognition software. Quite often unanticipated grammatical, syntax, homophones, and other interpretive errors are inadvertently transcribed by the computer software. Please disregard these errors. Please excuse any errors that have escaped final proofreading.

## 2021-11-10 ENCOUNTER — PATIENT OUTREACH (OUTPATIENT)
Dept: CASE MANAGEMENT | Age: 61
End: 2021-11-10

## 2021-11-10 LAB
ATRIAL RATE: 80 BPM
CALCULATED P AXIS, ECG09: 50 DEGREES
CALCULATED R AXIS, ECG10: 7 DEGREES
CALCULATED T AXIS, ECG11: 27 DEGREES
DIAGNOSIS, 93000: NORMAL
P-R INTERVAL, ECG05: 182 MS
Q-T INTERVAL, ECG07: 370 MS
QRS DURATION, ECG06: 82 MS
QTC CALCULATION (BEZET), ECG08: 426 MS
VENTRICULAR RATE, ECG03: 80 BPM

## 2021-11-10 NOTE — PROGRESS NOTES
Ambulatory Care Management Note    Date/Time:  11/10/2021 3:57 PM    This patient was received as a referral from Daily assignment. Ambulatory Care Manager outreached to patient today to offer care management services. Introduction to self and role of care manager provided. Pt states her shoulder is feeling a little better since recent ED visit. She admits she has not done the exercises. Recommended her to try the exercises and use ice afterwards. Also, suggested voltaren gel, otc apply to the area as directed. Patient declined care management services at this time. No follow up call scheduled at this time. Patient has Ambulatory Care Manager's contact number for for any questions or concerns.

## 2021-12-19 RX ORDER — AMLODIPINE BESYLATE 10 MG/1
TABLET ORAL
Qty: 90 TABLET | Refills: 0 | Status: SHIPPED | OUTPATIENT
Start: 2021-12-19 | End: 2022-06-06 | Stop reason: SDUPTHER

## 2022-01-19 ENCOUNTER — OFFICE VISIT (OUTPATIENT)
Dept: INTERNAL MEDICINE CLINIC | Age: 62
End: 2022-01-19
Payer: MEDICARE

## 2022-01-19 VITALS
RESPIRATION RATE: 19 BRPM | HEART RATE: 70 BPM | DIASTOLIC BLOOD PRESSURE: 70 MMHG | BODY MASS INDEX: 29.06 KG/M2 | SYSTOLIC BLOOD PRESSURE: 140 MMHG | OXYGEN SATURATION: 98 % | HEIGHT: 70 IN | TEMPERATURE: 98 F | WEIGHT: 203 LBS

## 2022-01-19 DIAGNOSIS — I10 ESSENTIAL HYPERTENSION: Primary | ICD-10-CM

## 2022-01-19 DIAGNOSIS — R60.9 PERIPHERAL EDEMA: ICD-10-CM

## 2022-01-19 DIAGNOSIS — L93.0 DISCOID LUPUS: ICD-10-CM

## 2022-01-19 DIAGNOSIS — M12.812 ROTATOR CUFF ARTHROPATHY OF LEFT SHOULDER: ICD-10-CM

## 2022-01-19 LAB
CHOLEST SERPL-MCNC: 181 MG/DL
HDLC SERPL-MCNC: 34 MG/DL
LDL CHOLESTEROL POC: 117 MG/DL
NON-HDL GOAL (POC): 147
TCHOL/HDL RATIO (POC): 5.4
TRIGL SERPL-MCNC: 150 MG/DL

## 2022-01-19 PROCEDURE — G9899 SCRN MAM PERF RSLTS DOC: HCPCS | Performed by: INTERNAL MEDICINE

## 2022-01-19 PROCEDURE — G8419 CALC BMI OUT NRM PARAM NOF/U: HCPCS | Performed by: INTERNAL MEDICINE

## 2022-01-19 PROCEDURE — G8510 SCR DEP NEG, NO PLAN REQD: HCPCS | Performed by: INTERNAL MEDICINE

## 2022-01-19 PROCEDURE — 80061 LIPID PANEL: CPT | Performed by: INTERNAL MEDICINE

## 2022-01-19 PROCEDURE — G8427 DOCREV CUR MEDS BY ELIG CLIN: HCPCS | Performed by: INTERNAL MEDICINE

## 2022-01-19 PROCEDURE — G8753 SYS BP > OR = 140: HCPCS | Performed by: INTERNAL MEDICINE

## 2022-01-19 PROCEDURE — 99214 OFFICE O/P EST MOD 30 MIN: CPT | Performed by: INTERNAL MEDICINE

## 2022-01-19 PROCEDURE — G8754 DIAS BP LESS 90: HCPCS | Performed by: INTERNAL MEDICINE

## 2022-01-19 PROCEDURE — 3017F COLORECTAL CA SCREEN DOC REV: CPT | Performed by: INTERNAL MEDICINE

## 2022-01-19 RX ORDER — CLOBETASOL PROPIONATE 0.5 MG/G
OINTMENT TOPICAL 2 TIMES DAILY
Qty: 45 G | Refills: 3 | Status: SHIPPED | OUTPATIENT
Start: 2022-01-19

## 2022-01-19 NOTE — PROGRESS NOTES
Patrick Hernandez is a 64 y.o. female and presents with Lupus and Hypertension  . Subjective:  Hypertension Review:  The patient has essential hypertension  Diet and Lifestyle: generally follows a  low sodium diet, exercises sporadically  Home BP Monitoring: is not measured at home. Pertinent ROS: taking medications as instructed, no medication side effects noted, no TIA's, no chest pain on exertion, no dyspnea on exertion, no swelling of ankles. Neck Pain Review:  Patient complains of neck pain. Onset of symptoms was months ago, gradually stablesince that time. Current symptoms are pain in neck (aching, dull in character; 6/10 in severity), weakness in back. Patient denies numbness, tingling, paresthesias in upper extremities. Patient denies weakness, diminished  strength, lack of coordination. Radiation of pain: . Patient has had no prior neck problems. Previous treatments include: medication: . Shoulder Pain Review:  Patient complains of left side shoulder pains have improved. The symptoms began months ago Course of symptoms since onset has been gradually improved. Pain is described as overall severity = moderate. Symptoms were incited by no known event. Patient denies N/A. Therapy to date includes OTC analgesics: effective. Review of Systems  Constitutional: negative for fevers, chills, anorexia and weight loss  Eyes:   negative for visual disturbance and irritation  ENT:   negative for tinnitus,sore throat,nasal congestion,ear pains. hoarseness  Respiratory:  negative for cough, hemoptysis, dyspnea,wheezing  CV:   negative for chest pain, palpitations, lower extremity edema  GI:   negative for nausea, vomiting, diarrhea, abdominal pain,melena  Endo:               negative for polyuria,polydipsia,polyphagia,heat intolerance  Genitourinary: negative for frequency, dysuria and hematuria  Integument:  negative for rash and pruritus  Hematologic:  negative for easy bruising and gum/nose bleeding  Musculoskel: negative for myalgias, arthralgias, back pain, muscle weakness, joint pain  Neurological:  negative for headaches, dizziness, vertigo, memory problems and gait   Behavl/Psych: negative for feelings of anxiety, depression, mood changes    Past Medical History:   Diagnosis Date    Anemia, unspecified 2/21/2011    Arthritis, Degenerative,Knee 2/21/2011    Degenerative Disc Disease 2/21/2011    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 No pertinent surgical history. Social History     Socioeconomic History    Marital status: UNKNOWN   Tobacco Use    Smoking status: Former Smoker    Smokeless tobacco: Never Used   Vaping Use    Vaping Use: Never used   Substance and Sexual Activity    Alcohol use: Yes     Alcohol/week: 0.8 standard drinks     Types: 1 Glasses of wine per week     Comment: SOCIALLY    Drug use: No    Sexual activity: Never     Family History   Problem Relation Age of Onset    Diabetes Mother     Hypertension Mother     Hypertension Father     Cancer Father      Current Outpatient Medications   Medication Sig Dispense Refill    amLODIPine (NORVASC) 10 mg tablet Take 1 tablet by mouth once daily 90 Tablet 0    mycophenolate (CELLCEPT) 500 mg tablet       losartan (COZAAR) 100 mg tablet Take 1 tablet by mouth once daily 90 Tablet 0    naproxen (NAPROSYN) 500 mg tablet TAKE 1 TABLET BY MOUTH TWICE DAILY WITH MEALS 60 Tablet 0    clobetasoL (TEMOVATE) 0.05 % ointment Apply  to affected area two (2) times a day. 45 g 3    cloNIDine HCL (CATAPRES) 0.2 mg tablet TAKE 1 TABLET BY MOUTH AT BEDTIME 90 Tablet 0    potassium chloride (KLOR-CON) 10 mEq tablet Take 2 Tablets by mouth daily.  60 Tablet 12    bumetanide (BUMEX) 1 mg tablet TAKE ONE TABLET BY MOUTH ONCE DAILY 30 Tablet 12    famotidine (PEPCID) 40 mg tablet Si tab po daily 90 Tab 3    ipratropium (ATROVENT) 42 mcg (0.06 %) nasal spray 2 Sprays by Both Nostrils route four (4) times daily. 15 mL 3    latanoprost (XALATAN) 0.005 % ophthalmic solution Administer 1 Drop to both eyes nightly.  cholecalciferol (VITAMIN D3) 1,000 unit cap Take 2,000 Units by mouth daily.  fluticasone propionate (FLONASE) 50 mcg/actuation nasal spray 2 Sprays by Both Nostrils route daily. 1 Bottle 12    baclofen (LIORESAL) 10 mg tablet Take 1 Tab by mouth three (3) times daily. (Patient not taking: Reported on 10/19/2021) 90 Tab 1     No Known Allergies    Objective:  Visit Vitals  BP (!) 140/70 (BP 1 Location: Right arm, BP Patient Position: Sitting, BP Cuff Size: Adult)   Pulse 70   Temp 98 °F (36.7 °C) (Oral)   Resp 19   Ht 5' 10\" (1.778 m)   Wt 203 lb (92.1 kg)   SpO2 98%   BMI 29.13 kg/m²     Physical Exam:   General appearance - alert, well appearing, and in no distress  Mental status - alert, oriented to person, place, and time  EYE-KASEY, EOMI, corneas normal, no foreign bodies  ENT-ENT exam normal, no neck nodes or sinus tenderness  Nose - normal and patent, no erythema, discharge or polyps  Mouth - mucous membranes moist, pharynx normal without lesions  Neck - supple, no significant adenopathy   Chest - clear to auscultation, no wheezes, rales or rhonchi, symmetric air entry   Heart - normal rate, regular rhythm, normal S1, S2, no murmurs, rubs, clicks or gallops   Abdomen - soft, nontender, nondistended, no masses or organomegaly  Lymph- no adenopathy palpable  Ext-peripheral pulses normal, no pedal edema, no clubbing or cyanosis  Skin-Warm and dry.  no hyperpigmentation, vitiligo, or suspicious lesions  Neuro -alert, oriented, normal speech, no focal findings or movement disorder noted  Neck-normal C-spine, no tenderness, full ROM without pain  Feet-no nail deformities or callus formation with good pulses noted  2+lower leg edema    Results for orders placed or performed during the hospital encounter of 74/32/74   METABOLIC PANEL, COMPREHENSIVE   Result Value Ref Range    Sodium 140 136 - 145 mmol/L    Potassium 3.9 3.5 - 5.1 mmol/L    Chloride 106 97 - 108 mmol/L    CO2 29 21 - 32 mmol/L    Anion gap 5 5 - 15 mmol/L    Glucose 99 65 - 100 mg/dL    BUN 12 6 - 20 MG/DL    Creatinine 0.77 0.55 - 1.02 MG/DL    BUN/Creatinine ratio 16 12 - 20      GFR est AA >60 >60 ml/min/1.73m2    GFR est non-AA >60 >60 ml/min/1.73m2    Calcium 9.3 8.5 - 10.1 MG/DL    Bilirubin, total 0.5 0.2 - 1.0 MG/DL    ALT (SGPT) 23 12 - 78 U/L    AST (SGOT) 22 15 - 37 U/L    Alk. phosphatase 133 (H) 45 - 117 U/L    Protein, total 8.6 (H) 6.4 - 8.2 g/dL    Albumin 3.2 (L) 3.5 - 5.0 g/dL    Globulin 5.4 (H) 2.0 - 4.0 g/dL    A-G Ratio 0.6 (L) 1.1 - 2.2     CBC WITH AUTOMATED DIFF   Result Value Ref Range    WBC 2.8 (L) 3.6 - 11.0 K/uL    RBC 3.87 3.80 - 5.20 M/uL    HGB 11.0 (L) 11.5 - 16.0 g/dL    HCT 34.1 (L) 35.0 - 47.0 %    MCV 88.1 80.0 - 99.0 FL    MCH 28.4 26.0 - 34.0 PG    MCHC 32.3 30.0 - 36.5 g/dL    RDW 14.6 (H) 11.5 - 14.5 %    PLATELET 937 (L) 254 - 400 K/uL    MPV 10.6 8.9 - 12.9 FL    NRBC 0.0 0  WBC    ABSOLUTE NRBC 0.00 0.00 - 0.01 K/uL    NEUTROPHILS 48 32 - 75 %    LYMPHOCYTES 40 12 - 49 %    MONOCYTES 9 5 - 13 %    EOSINOPHILS 1 0 - 7 %    BASOPHILS 1 0 - 1 %    IMMATURE GRANULOCYTES 0 0.0 - 0.5 %    ABS. NEUTROPHILS 1.4 (L) 1.8 - 8.0 K/UL    ABS. LYMPHOCYTES 1.1 0.8 - 3.5 K/UL    ABS. MONOCYTES 0.3 0.0 - 1.0 K/UL    ABS. EOSINOPHILS 0.0 0.0 - 0.4 K/UL    ABS. BASOPHILS 0.0 0.0 - 0.1 K/UL    ABS. IMM.  GRANS. 0.0 0.00 - 0.04 K/UL    DF AUTOMATED     TROPONIN-HIGH SENSITIVITY   Result Value Ref Range    Troponin-High Sensitivity 5 0 - 51 ng/L   EKG, 12 LEAD, INITIAL   Result Value Ref Range    Ventricular Rate 80 BPM    Atrial Rate 80 BPM    P-R Interval 182 ms    QRS Duration 82 ms    Q-T Interval 370 ms    QTC Calculation (Bezet) 426 ms    Calculated P Axis 50 degrees    Calculated R Axis 7 degrees    Calculated T Axis 27 degrees    Diagnosis       Normal sinus rhythm  Poor R-wave Progression (consider lead placement or loss of anterior forces)  Confirmed by Jany Ospina ([id number]) on 11/10/2021 6:47:35 AM         Assessment/Plan:    ICD-10-CM ICD-9-CM    1. Essential hypertension  I10 401.9    2. Discoid lupus  L93.0 695.4    3. Rotator cuff arthropathy of left shoulder  M12.812 716.81    4. Peripheral edema  R60.9 782.3      No orders of the defined types were placed in this encounter. lose weight, increase physical activity, follow low fat diet, follow low salt diet, continue present plan  Patient Instructions   MyChart Activation    Thank you for requesting access to [facility]. Please follow the instructions below to securely access and download your online medical record. [facility] allows you to send messages to your doctor, view your test results, renew your prescriptions, schedule appointments, and more. How Do I Sign Up? 1. In your internet browser, go to [email or web address]  2. Click on the First Time User? Click Here link in the Sign In box. You will be redirect to the New Member Sign Up page. 3. Enter your [facility] Access Code exactly as it appears below. You will not need to use this code after youve completed the sign-up process. If you do not sign up before the expiration date, you must request a new code. [facility] Access Code: [email or web address]  Expires: 2022  8:06 AM (This is the date your [facility] access code will )    4. Enter the last four digits of your Social Security Number (xxxx) and Date of Birth (mm/dd/yyyy) as indicated and click Submit. You will be taken to the next sign-up page. 5. Create a [facility] ID. This will be your [facility] login ID and cannot be changed, so think of one that is secure and easy to remember. 6. Create a [facility] password.  You can change your password at any time.  7. Enter your Password Reset Question and Answer. This can be used at a later time if you forget your password. 8. Enter your e-mail address. You will receive e-mail notification when new information is available in [address] 9. Click Sign Up. You can now view and download portions of your medical record. 10. Click the Download Summary menu link to download a portable copy of your medical information. Additional Information    If you have questions, please visit the Frequently Asked Questions section of the [facility] website at [email or web address] ([email or web address] Remember, [facility] is NOT to be used for urgent needs. For medical emergencies, dial 911. Follow-up and Dispositions    · Return in about 3 months (around 4/19/2022), or if symptoms worsen or fail to improve. I have reviewed with the patient details of the assessment and plan and all questions were answered. Relevent patient education was performed    An After Visit Summary was printed and given to the patient.

## 2022-01-19 NOTE — PROGRESS NOTES
Chief Complaint   Patient presents with    Lupus    Hypertension     3 most recent PHQ Screens 1/19/2022   PHQ Not Done -   Little interest or pleasure in doing things Not at all   Feeling down, depressed, irritable, or hopeless Not at all   Total Score PHQ 2 0     1. Have you been to the ER, urgent care clinic since your last visit? Hospitalized since your last visit?no    2. Have you seen or consulted any other health care providers outside of the [address] [place] [address] since your last visit? Include any pap smears or colon screening.  no

## 2022-01-19 NOTE — PATIENT INSTRUCTIONS
[employer] Activation    Thank you for requesting access to [employer]. Please follow the instructions below to securely access and download your online medical record. [employer] allows you to send messages to your doctor, view your test results, renew your prescriptions, schedule appointments, and more. How Do I Sign Up? 1. In your internet browser, go to [email or web address]  2. Click on the First Time User? Click Here link in the Sign In box. You will be redirect to the New Member Sign Up page. 3. Enter your [employer] Access Code exactly as it appears below. You will not need to use this code after youve completed the sign-up process. If you do not sign up before the expiration date, you must request a new code. [employer] Access Code: [email or web address]  Expires: 2022  8:06 AM (This is the date your [employer] access code will )    4. Enter the last four digits of your Social Security Number (xxxx) and Date of Birth (mm/dd/yyyy) as indicated and click Submit. You will be taken to the next sign-up page. 5. Create a [employer] ID. This will be your [employer] login ID and cannot be changed, so think of one that is secure and easy to remember. 6. Create a [employer] password. You can change your password at any time. 7. Enter your Password Reset Question and Answer. This can be used at a later time if you forget your password. 8. Enter your e-mail address. You will receive e-mail notification when new information is available in [address] 9. Click Sign Up. You can now view and download portions of your medical record. 10. Click the Download Summary menu link to download a portable copy of your medical information. Additional Information    If you have questions, please visit the Frequently Asked Questions section of the [employer] website at [email or web address] Remember, [employer] is NOT to be used for urgent needs. For medical emergencies, dial 911.

## 2022-04-01 DIAGNOSIS — M50.30 DEGENERATION OF CERVICAL INTERVERTEBRAL DISC: ICD-10-CM

## 2022-04-03 RX ORDER — CLONIDINE HYDROCHLORIDE 0.2 MG/1
TABLET ORAL
Qty: 90 TABLET | Refills: 0 | Status: SHIPPED | OUTPATIENT
Start: 2022-04-03

## 2022-04-03 RX ORDER — NAPROXEN 500 MG/1
TABLET ORAL
Qty: 60 TABLET | Refills: 0 | Status: SHIPPED | OUTPATIENT
Start: 2022-04-03

## 2022-04-03 RX ORDER — LOSARTAN POTASSIUM 100 MG/1
TABLET ORAL
Qty: 90 TABLET | Refills: 0 | Status: SHIPPED | OUTPATIENT
Start: 2022-04-03 | End: 2022-07-03

## 2022-04-20 ENCOUNTER — OFFICE VISIT (OUTPATIENT)
Dept: INTERNAL MEDICINE CLINIC | Age: 62
End: 2022-04-20
Payer: MEDICARE

## 2022-04-20 VITALS
SYSTOLIC BLOOD PRESSURE: 130 MMHG | BODY MASS INDEX: 28.06 KG/M2 | DIASTOLIC BLOOD PRESSURE: 70 MMHG | HEART RATE: 70 BPM | WEIGHT: 196 LBS | RESPIRATION RATE: 20 BRPM | OXYGEN SATURATION: 98 % | HEIGHT: 70 IN | TEMPERATURE: 98.4 F

## 2022-04-20 DIAGNOSIS — I10 ESSENTIAL HYPERTENSION: Primary | ICD-10-CM

## 2022-04-20 DIAGNOSIS — M54.16 LUMBAR RADICULOPATHY, ACUTE: ICD-10-CM

## 2022-04-20 LAB
CHOLEST SERPL-MCNC: 175 MG/DL
HDLC SERPL-MCNC: 37 MG/DL
LDL CHOLESTEROL POC: 118 MG/DL
NON-HDL GOAL (POC): 138
TCHOL/HDL RATIO (POC): 4.7
TRIGL SERPL-MCNC: 101 MG/DL

## 2022-04-20 PROCEDURE — 99214 OFFICE O/P EST MOD 30 MIN: CPT | Performed by: INTERNAL MEDICINE

## 2022-04-20 PROCEDURE — G8754 DIAS BP LESS 90: HCPCS | Performed by: INTERNAL MEDICINE

## 2022-04-20 PROCEDURE — G8510 SCR DEP NEG, NO PLAN REQD: HCPCS | Performed by: INTERNAL MEDICINE

## 2022-04-20 PROCEDURE — G8419 CALC BMI OUT NRM PARAM NOF/U: HCPCS | Performed by: INTERNAL MEDICINE

## 2022-04-20 PROCEDURE — G9899 SCRN MAM PERF RSLTS DOC: HCPCS | Performed by: INTERNAL MEDICINE

## 2022-04-20 PROCEDURE — 80061 LIPID PANEL: CPT | Performed by: INTERNAL MEDICINE

## 2022-04-20 PROCEDURE — G8752 SYS BP LESS 140: HCPCS | Performed by: INTERNAL MEDICINE

## 2022-04-20 PROCEDURE — G8427 DOCREV CUR MEDS BY ELIG CLIN: HCPCS | Performed by: INTERNAL MEDICINE

## 2022-04-20 PROCEDURE — 3017F COLORECTAL CA SCREEN DOC REV: CPT | Performed by: INTERNAL MEDICINE

## 2022-04-20 RX ORDER — LATANOPROST 50 UG/ML
1 SOLUTION/ DROPS OPHTHALMIC
Qty: 2.5 ML | Refills: 12 | Status: SHIPPED | OUTPATIENT
Start: 2022-04-20

## 2022-04-20 RX ORDER — FAMOTIDINE 40 MG/1
TABLET, FILM COATED ORAL
Qty: 90 TABLET | Refills: 0 | Status: SHIPPED | OUTPATIENT
Start: 2022-04-20

## 2022-04-20 RX ORDER — FAMOTIDINE 40 MG/1
TABLET, FILM COATED ORAL
Qty: 90 TABLET | Refills: 3 | Status: SHIPPED | OUTPATIENT
Start: 2022-04-20

## 2022-04-20 NOTE — PROGRESS NOTES
Quang Medina is a 58 y.o. female and presents with Hypertension and Numbness (foot)  . Subjective:  Hypertension Review:  The patient has essential hypertension  Diet and Lifestyle: generally follows a  low sodium diet, exercises sporadically  Home BP Monitoring: is not measured at home. Pertinent ROS: taking medications as instructed, no medication side effects noted, no TIA's, no chest pain on exertion, no dyspnea on exertion, no swelling of ankles. Recurrent lt foot numbness reported      Shoulder Pain Review:  Patient complains of left side shoulder pains have improved. The symptoms began months ago Course of symptoms since onset has been gradually improved. Pain is described as overall severity = moderate. Symptoms were incited by no known event. Patient denies N/A. Therapy to date includes OTC analgesics: effective. Review of Systems  Constitutional: negative for fevers, chills, anorexia and weight loss  Eyes:   negative for visual disturbance and irritation  ENT:   negative for tinnitus,sore throat,nasal congestion,ear pains. hoarseness  Respiratory:  negative for cough, hemoptysis, dyspnea,wheezing  CV:   negative for chest pain, palpitations, lower extremity edema  GI:   negative for nausea, vomiting, diarrhea, abdominal pain,melena  Endo:               negative for polyuria,polydipsia,polyphagia,heat intolerance  Genitourinary: negative for frequency, dysuria and hematuria  Integument:  negative for rash and pruritus  Hematologic:  negative for easy bruising and gum/nose bleeding  Musculoskel: myalgias, arthralgias, back pain, muscle weakness, joint pain  Neurological:  negative for headaches, dizziness, vertigo, memory problems and gait   Behavl/Psych: negative for feelings of anxiety, depression, mood changes    Past Medical History:   Diagnosis Date    Anemia, unspecified 2/21/2011    Arthritis, Degenerative,Knee 2/21/2011    Degenerative Disc Disease 2/21/2011    Discoid lupus 2011    Essential hypertension, benign 2011    GERD, Gastro-Esophageal Reflux Disease 2011    Osteoarthritis 2011    Osteoartritis 2011    Tendonitis 2011    Xerosis 2011     History reviewed. No pertinent surgical history. Social History     Socioeconomic History    Marital status: UNKNOWN   Tobacco Use    Smoking status: Former Smoker    Smokeless tobacco: Never Used   Vaping Use    Vaping Use: Never used   Substance and Sexual Activity    Alcohol use: Yes     Alcohol/week: 0.8 standard drinks     Types: 1 Glasses of wine per week     Comment: SOCIALLY    Drug use: No    Sexual activity: Never     Family History   Problem Relation Age of Onset    Diabetes Mother     Hypertension Mother     Hypertension Father     Cancer Father      Current Outpatient Medications   Medication Sig Dispense Refill    famotidine (PEPCID) 40 mg tablet Si tab po daily 90 Tablet 3    latanoprost (XALATAN) 0.005 % ophthalmic solution Administer 1 Drop to both eyes nightly. 2.5 mL 12    losartan (COZAAR) 100 mg tablet Take 1 tablet by mouth once daily 90 Tablet 0    naproxen (NAPROSYN) 500 mg tablet TAKE 1 TABLET BY MOUTH TWICE DAILY WITH MEALS 60 Tablet 0    cloNIDine HCL (CATAPRES) 0.2 mg tablet TAKE 1 TABLET BY MOUTH AT BEDTIME 90 Tablet 0    clobetasoL (TEMOVATE) 0.05 % ointment Apply  to affected area two (2) times a day. 45 g 3    amLODIPine (NORVASC) 10 mg tablet Take 1 tablet by mouth once daily 90 Tablet 0    mycophenolate (CELLCEPT) 500 mg tablet       potassium chloride (KLOR-CON) 10 mEq tablet Take 2 Tablets by mouth daily. 60 Tablet 12    bumetanide (BUMEX) 1 mg tablet TAKE ONE TABLET BY MOUTH ONCE DAILY 30 Tablet 12    ipratropium (ATROVENT) 42 mcg (0.06 %) nasal spray 2 Sprays by Both Nostrils route four (4) times daily. 15 mL 3    baclofen (LIORESAL) 10 mg tablet Take 1 Tab by mouth three (3) times daily.  (Patient not taking: Reported on 10/19/2021) 90 Tab 1    cholecalciferol (VITAMIN D3) 1,000 unit cap Take 2,000 Units by mouth daily.  fluticasone propionate (FLONASE) 50 mcg/actuation nasal spray 2 Sprays by Both Nostrils route daily. 1 Bottle 12     No Known Allergies    Objective:  Visit Vitals  /70 (BP 1 Location: Right arm, BP Patient Position: Sitting, BP Cuff Size: Adult)   Pulse 70   Temp 98.4 °F (36.9 °C) (Oral)   Resp 20   Ht 5' 10\" (1.778 m)   Wt 196 lb (88.9 kg)   SpO2 98%   BMI 28.12 kg/m²     Physical Exam:   General appearance - alert, well appearing, and in moderate distress  Mental status - alert, oriented to person, place, and time  EYE-KASEY, EOMI, corneas normal, no foreign bodies  ENT-ENT exam normal, no neck nodes or sinus tenderness  Nose - normal and patent, no erythema, discharge or polyps  Mouth - mucous membranes moist, pharynx normal without lesions  Neck - supple, no significant adenopathy   Chest - clear to auscultation, no wheezes, rales or rhonchi, symmetric air entry   Heart - normal rate, regular rhythm, normal S1, S2, no murmurs, rubs, clicks or gallops   Abdomen - soft, nontender, nondistended, no masses or organomegaly  Lymph- no adenopathy palpable  Ext-peripheral pulses normal, no pedal edema, no clubbing or cyanosis  Skin-Warm and dry. no hyperpigmentation, vitiligo, or suspicious lesions  Neuro -alert, oriented, normal speech, no focal findings or movement disorder noted  Neck-normal C-spine, no tenderness, full ROM without pain  Feet-no nail deformities or callus formation with good pulses noted  2+lower leg edema    Results for orders placed or performed in visit on 04/20/22   AMB POC LIPID PROFILE   Result Value Ref Range    Cholesterol (POC) 175     Triglycerides (POC) 101     HDL Cholesterol (POC) 37     LDL Cholesterol (POC) 118 MG/DL    Non-HDL Goal (POC) 138     TChol/HDL Ratio (POC) 4.7        Assessment/Plan:    ICD-10-CM ICD-9-CM    1. Essential hypertension  I10 401.9 AMB POC LIPID PROFILE   2.  Lumbar radiculopathy, acute  M54.16 724.4 REFERRAL TO NEUROSURGERY     Orders Placed This Encounter    REFERRAL TO NEUROSURGERY     Referral Priority:   Routine     Referral Type:   Consultation     Referral Reason:   Specialty Services Required     Referred to Provider:   Reg Grier MD     Number of Visits Requested:   1    AMB POC LIPID PROFILE    famotidine (PEPCID) 40 mg tablet     Sig: Si tab po daily     Dispense:  90 Tablet     Refill:  3    latanoprost (XALATAN) 0.005 % ophthalmic solution     Sig: Administer 1 Drop to both eyes nightly. Dispense:  2.5 mL     Refill:  12     lose weight, increase physical activity, follow low fat diet, follow low salt diet, continue present plan     Follow-up and Dispositions    · Return in about 4 weeks (around 2022), or if symptoms worsen or fail to improve. I have reviewed with the patient details of the assessment and plan and all questions were answered. Relevent patient education was performed    An After Visit Summary was printed and given to the patient.     ·

## 2022-04-20 NOTE — PATIENT INSTRUCTIONS
Jero Brown is a 58 y.o. female and presents with Hypertension and Numbness (foot)  . Subjective:  Hypertension Review:  The patient has essential hypertension  Diet and Lifestyle: generally follows a  low sodium diet, exercises sporadically  Home BP Monitoring: is not measured at home. Pertinent ROS: taking medications as instructed, no medication side effects noted, no TIA's, no chest pain on exertion, no dyspnea on exertion, no swelling of ankles. She has recurrent numbness in her lt.foot  She states she has had previous back surgery      Review of Systems  Constitutional: negative for fevers, chills, anorexia and weight loss  Eyes:   negative for visual disturbance and irritation  ENT:   negative for tinnitus,sore throat,nasal congestion,ear pains. hoarseness  Respiratory:  negative for cough, hemoptysis, dyspnea,wheezing  CV:   negative for chest pain, palpitations, lower extremity edema  GI:   negative for nausea, vomiting, diarrhea, abdominal pain,melena  Endo:               negative for polyuria,polydipsia,polyphagia,heat intolerance  Genitourinary: negative for frequency, dysuria and hematuria  Integument:  negative for rash and pruritus  Hematologic:  negative for easy bruising and gum/nose bleeding  Musculoskel: negative for myalgias, arthralgias, back pain, muscle weakness, joint pain  Neurological:  negative for headaches, dizziness, vertigo, memory problems and gait   Behavl/Psych: negative for feelings of anxiety, depression, mood changes    Past Medical History:   Diagnosis Date    Anemia, unspecified 2/21/2011    Arthritis, Degenerative,Knee 2/21/2011    Degenerative Disc Disease 2/21/2011    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 No pertinent surgical history.   Social History Socioeconomic History    Marital status: UNKNOWN   Tobacco Use    Smoking status: Former Smoker    Smokeless tobacco: Never Used   Vaping Use    Vaping Use: Never used   Substance and Sexual Activity    Alcohol use: Yes     Alcohol/week: 0.8 standard drinks     Types: 1 Glasses of wine per week     Comment: SOCIALLY    Drug use: No    Sexual activity: Never     Family History   Problem Relation Age of Onset    Diabetes Mother     Hypertension Mother     Hypertension Father     Cancer Father      Current Outpatient Medications   Medication Sig Dispense Refill    losartan (COZAAR) 100 mg tablet Take 1 tablet by mouth once daily 90 Tablet 0    naproxen (NAPROSYN) 500 mg tablet TAKE 1 TABLET BY MOUTH TWICE DAILY WITH MEALS 60 Tablet 0    cloNIDine HCL (CATAPRES) 0.2 mg tablet TAKE 1 TABLET BY MOUTH AT BEDTIME 90 Tablet 0    clobetasoL (TEMOVATE) 0.05 % ointment Apply  to affected area two (2) times a day. 45 g 3    amLODIPine (NORVASC) 10 mg tablet Take 1 tablet by mouth once daily 90 Tablet 0    mycophenolate (CELLCEPT) 500 mg tablet       potassium chloride (KLOR-CON) 10 mEq tablet Take 2 Tablets by mouth daily. 60 Tablet 12    bumetanide (BUMEX) 1 mg tablet TAKE ONE TABLET BY MOUTH ONCE DAILY 30 Tablet 12    famotidine (PEPCID) 40 mg tablet Si tab po daily 90 Tab 3    ipratropium (ATROVENT) 42 mcg (0.06 %) nasal spray 2 Sprays by Both Nostrils route four (4) times daily. 15 mL 3    baclofen (LIORESAL) 10 mg tablet Take 1 Tab by mouth three (3) times daily. (Patient not taking: Reported on 10/19/2021) 90 Tab 1    latanoprost (XALATAN) 0.005 % ophthalmic solution Administer 1 Drop to both eyes nightly.  cholecalciferol (VITAMIN D3) 1,000 unit cap Take 2,000 Units by mouth daily.  fluticasone propionate (FLONASE) 50 mcg/actuation nasal spray 2 Sprays by Both Nostrils route daily.  1 Bottle 12     No Known Allergies    Objective:  Visit Vitals  /70 (BP 1 Location: Right arm, BP Patient Position: Sitting, BP Cuff Size: Adult)   Pulse 70   Temp 98.4 °F (36.9 °C) (Oral)   Resp 20   Ht 5' 10\" (1.778 m)   Wt 196 lb (88.9 kg)   SpO2 98%   BMI 28.12 kg/m²     Physical Exam:   General appearance - alert, well appearing, and in no distress  Mental status - alert, oriented to person, place, and time  EYE-KASEY, EOMI, corneas normal, no foreign bodies  ENT-ENT exam normal, no neck nodes or sinus tenderness  Nose - normal and patent, no erythema, discharge or polyps  Mouth - mucous membranes moist, pharynx normal without lesions  Neck - supple, no significant adenopathy   Chest - clear to auscultation, no wheezes, rales or rhonchi, symmetric air entry   Heart - normal rate, regular rhythm, normal S1, S2, no murmurs, rubs, clicks or gallops   Abdomen - soft, nontender, nondistended, no masses or organomegaly  Lymph- no adenopathy palpable  Ext-peripheral pulses normal, no pedal edema, no clubbing or cyanosis  Skin-Warm and dry. no hyperpigmentation, vitiligo, or suspicious lesions  Neuro -alert, oriented, normal speech, no focal findings or movement disorder noted  Neck-normal C-spine, no tenderness, full ROM without pain  Feet-no nail deformities or callus formation with good pulses noted  2+lower leg edema    Results for orders placed or performed in visit on 04/20/22   AMB POC LIPID PROFILE   Result Value Ref Range    Cholesterol (POC) 175     Triglycerides (POC) 101     HDL Cholesterol (POC) 37     LDL Cholesterol (POC) 118 MG/DL    Non-HDL Goal (POC) 138     TChol/HDL Ratio (POC) 4.7        Assessment/Plan:    ICD-10-CM ICD-9-CM    1. Essential hypertension  I10 401.9 AMB POC LIPID PROFILE     Orders Placed This Encounter    AMB POC LIPID PROFILE     lose weight, increase physical activity, follow low fat diet, follow low salt diet, continue present plan  Patient Instructions   [employer] Activation    Thank you for requesting access to [employer].  Please follow the instructions below to securely access and download your online medical record. [employer] allows you to send messages to your doctor, view your test results, renew your prescriptions, schedule appointments, and more. How Do I Sign Up? 1. In your internet browser, go to [email or web address]  2. Click on the First Time User? Click Here link in the Sign In box. You will be redirect to the New Member Sign Up page. 3. Enter your [employer] Access Code exactly as it appears below. You will not need to use this code after youve completed the sign-up process. If you do not sign up before the expiration date, you must request a new code. [employer] Access Code: [email or web address]  Expires: 2022  8:06 AM (This is the date your [employer] access code will )    4. Enter the last four digits of your Social Security Number (xxxx) and Date of Birth (mm/dd/yyyy) as indicated and click Submit. You will be taken to the next sign-up page. 5. Create a [employer] ID. This will be your [employer] login ID and cannot be changed, so think of one that is secure and easy to remember. 6. Create a [employer] password. You can change your password at any time. 7. Enter your Password Reset Question and Answer. This can be used at a later time if you forget your password. 8. Enter your e-mail address. You will receive e-mail notification when new information is available in [address] 9. Click Sign Up. You can now view and download portions of your medical record. 10. Click the Download Summary menu link to download a portable copy of your medical information. Additional Information    If you have questions, please visit the Frequently Asked Questions section of the [employer] website at [email or web address] Remember, [employer] is NOT to be used for urgent needs. For medical emergencies, dial 911. I have reviewed with the patient details of the assessment and plan and all questions were answered.  Relevent patient education was performed    An After Visit Summary was printed and given to the patient.

## 2022-06-06 ENCOUNTER — VIRTUAL VISIT (OUTPATIENT)
Dept: INTERNAL MEDICINE CLINIC | Age: 62
End: 2022-06-06
Payer: MEDICARE

## 2022-06-06 DIAGNOSIS — I10 ESSENTIAL HYPERTENSION: ICD-10-CM

## 2022-06-06 DIAGNOSIS — M54.16 LUMBAR RADICULOPATHY, ACUTE: Primary | ICD-10-CM

## 2022-06-06 PROCEDURE — 3017F COLORECTAL CA SCREEN DOC REV: CPT | Performed by: INTERNAL MEDICINE

## 2022-06-06 PROCEDURE — G8756 NO BP MEASURE DOC: HCPCS | Performed by: INTERNAL MEDICINE

## 2022-06-06 PROCEDURE — G8510 SCR DEP NEG, NO PLAN REQD: HCPCS | Performed by: INTERNAL MEDICINE

## 2022-06-06 PROCEDURE — G8427 DOCREV CUR MEDS BY ELIG CLIN: HCPCS | Performed by: INTERNAL MEDICINE

## 2022-06-06 PROCEDURE — G9899 SCRN MAM PERF RSLTS DOC: HCPCS | Performed by: INTERNAL MEDICINE

## 2022-06-06 PROCEDURE — 99213 OFFICE O/P EST LOW 20 MIN: CPT | Performed by: INTERNAL MEDICINE

## 2022-06-06 RX ORDER — GABAPENTIN 100 MG/1
100 CAPSULE ORAL 3 TIMES DAILY
Qty: 90 CAPSULE | Refills: 0 | Status: SHIPPED | OUTPATIENT
Start: 2022-06-06

## 2022-06-06 RX ORDER — AMLODIPINE BESYLATE 10 MG/1
10 TABLET ORAL DAILY
Qty: 90 TABLET | Refills: 3 | Status: SHIPPED | OUTPATIENT
Start: 2022-06-06

## 2022-06-06 NOTE — PROGRESS NOTES
1. Have you been to the ER, urgent care clinic since your last visit? Hospitalized since your last visit?no    2. Have you seen or consulted any other health care providers outside of the [address] [place] [address] since your last visit? Include any pap smears or colon screening.  No    Chief Complaint   Patient presents with    Leg Problem       3 most recent PHQ Screens 6/6/2022   PHQ Not Done -   Little interest or pleasure in doing things Not at all   Feeling down, depressed, irritable, or hopeless Not at all   Total Score PHQ 2 0

## 2022-06-06 NOTE — PATIENT INSTRUCTIONS
"[employer] Activation    Thank you for requesting access to "[employer]. Please follow the instructions below to securely access and download your online medical record. "[employer] allows you to send messages to your doctor, view your test results, renew your prescriptions, schedule appointments, and more. How Do I Sign Up? 1. In your internet browser, go to [email or web address]  2. Click on the First Time User? Click Here link in the Sign In box. You will be redirect to the New Member Sign Up page. 3. Enter your "[employer] Access Code exactly as it appears below. You will not need to use this code after youve completed the sign-up process. If you do not sign up before the expiration date, you must request a new code. "[employer] Access Code: [email or web address]  Expires: 2022 11:56 AM (This is the date your "[employer] access code will )    4. Enter the last four digits of your Social Security Number (xxxx) and Date of Birth (mm/dd/yyyy) as indicated and click Submit. You will be taken to the next sign-up page. 5. Create a "[employer] ID. This will be your "[employer] login ID and cannot be changed, so think of one that is secure and easy to remember. 6. Create a "[employer] password. You can change your password at any time. 7. Enter your Password Reset Question and Answer. This can be used at a later time if you forget your password. 8. Enter your e-mail address. You will receive e-mail notification when new information is available in [address] 9. Click Sign Up. You can now view and download portions of your medical record. 10. Click the Download Summary menu link to download a portable copy of your medical information. Additional Information    If you have questions, please visit the Frequently Asked Questions section of the "[employer] website at [email or web address] Remember, "[employer] is NOT to be used for urgent needs. For medical emergencies, dial 911.

## 2022-06-06 NOTE — PROGRESS NOTES
Samantha Villareal is a 58 y.o. female being evaluated by a Virtual Visit (video visit) encounter to address concerns as mentioned above. A caregiver was present when appropriate. Due to this being a TeleHealth encounter ([facility]), evaluation of the following organ systems was limited: Vitals/Constitutional/EENT/Resp/CV/GI//MS/Neuro/Skin/Heme-Lymph-Imm. Pursuant to the emergency declaration under the [address] [place] [address] and the [employer] Act, this Virtual Visit was conducted with patient's (and/or legal guardian's) consent, to reduce the risk of exposure to COVID-19 and provide necessary medical care. Services were provided through a video synchronous discussion virtually to substitute for in-person encounter. --Baldomero Vinson MD on 6/6/2022 at 12:25 PM    An electronic signature was used to authenticate this note. Samantha Villareal is a 58 y.o. female and presents with Leg Problem (tingling )  . Subjective:  She still has numbness in her lt.foot  She has seen neurology and was told that neurontin was needed. Hypertension Review:  The patient has essential hypertension  Diet and Lifestyle: generally follows a  low sodium diet, exercises sporadically  Home BP Monitoring: is not measured at home. Pertinent ROS: taking medications as instructed, no medication side effects noted, no TIA's, no chest pain on exertion, no dyspnea on exertion, no swelling of ankles. Review of Systems  Constitutional: negative for fevers, chills, anorexia and weight loss  Eyes:   negative for visual disturbance and irritation  ENT:   negative for tinnitus,sore throat,nasal congestion,ear pains. hoarseness  Respiratory:  negative for cough, hemoptysis, dyspnea,wheezing  CV:   negative for chest pain, palpitations, lower extremity edema  GI:   negative for nausea, vomiting, diarrhea, abdominal pain,melena  Endo:               negative for polyuria,polydipsia,polyphagia,heat intolerance  Genitourinary: negative for frequency, dysuria and hematuria  Integument:  negative for rash and pruritus  Hematologic:  negative for easy bruising and gum/nose bleeding  Musculoskel: negative for myalgias, arthralgias, back pain, muscle weakness, joint pain  Neurological:  negative for headaches, dizziness, vertigo, memory problems and gait   Behavl/Psych: negative for feelings of anxiety, depression, mood changes    Past Medical History:   Diagnosis Date    Anemia, unspecified 2011    Arthritis, Degenerative,Knee 2011    Degenerative Disc Disease 2011    Discoid lupus 2011    Essential hypertension, benign 2011    GERD, Gastro-Esophageal Reflux Disease 2011    Osteoarthritis 2011    Osteoartritis 2011    Tendonitis 2011    Xerosis 2011     History reviewed. No pertinent surgical history. Social History     Socioeconomic History    Marital status: UNKNOWN   Tobacco Use    Smoking status: Former Smoker    Smokeless tobacco: Never Used   Vaping Use    Vaping Use: Never used   Substance and Sexual Activity    Alcohol use: Yes     Alcohol/week: 0.8 standard drinks     Types: 1 Glasses of wine per week     Comment: SOCIALLY    Drug use: No    Sexual activity: Never     Family History   Problem Relation Age of Onset    Diabetes Mother     Hypertension Mother     Hypertension Father     Cancer Father      Current Outpatient Medications   Medication Sig Dispense Refill    amLODIPine (NORVASC) 10 mg tablet Take 1 Tablet by mouth daily. 90 Tablet 3    gabapentin (NEURONTIN) 100 mg capsule Take 1 Capsule by mouth three (3) times daily. Max Daily Amount: 300 mg. 90 Capsule 0    famotidine (PEPCID) 40 mg tablet Si tab po daily 90 Tablet 3    latanoprost (XALATAN) 0.005 % ophthalmic solution Administer 1 Drop to both eyes nightly.  2.5 mL 12    losartan (COZAAR) 100 mg tablet Take 1 tablet by mouth once daily 90 Tablet 0    naproxen (NAPROSYN) 500 mg tablet TAKE 1 TABLET BY MOUTH TWICE DAILY WITH MEALS 60 Tablet 0    cloNIDine HCL (CATAPRES) 0.2 mg tablet TAKE 1 TABLET BY MOUTH AT BEDTIME 90 Tablet 0    clobetasoL (TEMOVATE) 0.05 % ointment Apply  to affected area two (2) times a day. 45 g 3    mycophenolate (CELLCEPT) 500 mg tablet       potassium chloride (KLOR-CON) 10 mEq tablet Take 2 Tablets by mouth daily. 60 Tablet 12    bumetanide (BUMEX) 1 mg tablet TAKE ONE TABLET BY MOUTH ONCE DAILY 30 Tablet 12    ipratropium (ATROVENT) 42 mcg (0.06 %) nasal spray 2 Sprays by Both Nostrils route four (4) times daily. 15 mL 3    cholecalciferol (VITAMIN D3) 1,000 unit cap Take 2,000 Units by mouth daily.  fluticasone propionate (FLONASE) 50 mcg/actuation nasal spray 2 Sprays by Both Nostrils route daily. 1 Bottle 12    famotidine (PEPCID) 40 mg tablet Take 1 tablet by mouth once daily 90 Tablet 0     No Known Allergies    Objective: There were no vitals taken for this visit. Physical Exam:   General appearance - alert, well appearing, and in no distress  Mental status - alert, oriented to person, place, and time  EYE-KASEY, EOMI, corneas normal, no foreign bodies  ENT-ENT exam normal, no neck nodes or sinus tenderness  Nose - normal and patent, no erythema, discharge or polyps  Mouth - mucous membranes moist, pharynx normal without lesions  Skin-Warm and dry.  no hyperpigmentation, vitiligo, or suspicious lesions  Neuro -alert, oriented, normal speech, no focal findings or movement disorder noted  Neck-normal C-spine, no tenderness, full ROM without pain        Results for orders placed or performed in visit on 04/20/22   AMB POC LIPID PROFILE   Result Value Ref Range    Cholesterol (POC) 175     Triglycerides (POC) 101     HDL Cholesterol (POC) 37     LDL Cholesterol (POC) 118 MG/DL    Non-HDL Goal (POC) 138     TChol/HDL Ratio (POC) 4.7 Assessment/Plan:    ICD-10-CM ICD-9-CM    1. Lumbar radiculopathy, acute  M54.16 724.4 gabapentin (NEURONTIN) 100 mg capsule   2. Essential hypertension  I10 401.9      Orders Placed This Encounter    amLODIPine (NORVASC) 10 mg tablet     Sig: Take 1 Tablet by mouth daily. Dispense:  90 Tablet     Refill:  3    gabapentin (NEURONTIN) 100 mg capsule     Sig: Take 1 Capsule by mouth three (3) times daily. Max Daily Amount: 300 mg. Dispense:  90 Capsule     Refill:  0     lose weight, follow low salt diet, call if any problems,Take 81mg aspirin daily  Patient Instructions   MyChart Activation    Thank you for requesting access to [employer]. Please follow the instructions below to securely access and download your online medical record. [employer] allows you to send messages to your doctor, view your test results, renew your prescriptions, schedule appointments, and more. How Do I Sign Up? 1. In your internet browser, go to [email or web address]  2. Click on the First Time User? Click Here link in the Sign In box. You will be redirect to the New Member Sign Up page. 3. Enter your [employer] Access Code exactly as it appears below. You will not need to use this code after youve completed the sign-up process. If you do not sign up before the expiration date, you must request a new code. [employer] Access Code: [email or web address]  Expires: 2022 11:56 AM (This is the date your [employer] access code will )    4. Enter the last four digits of your Social Security Number (xxxx) and Date of Birth (mm/dd/yyyy) as indicated and click Submit. You will be taken to the next sign-up page. 5. Create a [employer] ID. This will be your [employer] login ID and cannot be changed, so think of one that is secure and easy to remember. 6. Create a [employer] password. You can change your password at any time. 7. Enter your Password Reset Question and Answer. This can be used at a later time if you forget your password. 8. Enter your e-mail address. You will receive e-mail notification when new information is available in [address] 9. Click Sign Up. You can now view and download portions of your medical record. 10. Click the Download Summary menu link to download a portable copy of your medical information. Additional Information    If you have questions, please visit the Frequently Asked Questions section of the [employer] website at [email or web address] Remember, [employer] is NOT to be used for urgent needs. For medical emergencies, dial 911. Follow-up and Dispositions    · Return in about 3 months (around 9/6/2022), or if symptoms worsen or fail to improve. I have reviewed with the patient details of the assessment and plan and all questions were answered. Relevent patient education was performed. The most recent lab findings were reviewed with the patient. An After Visit Summary was printed and given to the patient.

## 2022-07-03 RX ORDER — LOSARTAN POTASSIUM 100 MG/1
TABLET ORAL
Qty: 90 TABLET | Refills: 0 | Status: SHIPPED | OUTPATIENT
Start: 2022-07-03

## 2022-08-11 DIAGNOSIS — R60.9 PERIPHERAL EDEMA: ICD-10-CM

## 2022-08-12 RX ORDER — BUMETANIDE 1 MG/1
TABLET ORAL
Qty: 30 TABLET | Refills: 0 | Status: SHIPPED | OUTPATIENT
Start: 2022-08-12 | End: 2022-09-13

## 2022-09-13 DIAGNOSIS — R60.9 PERIPHERAL EDEMA: ICD-10-CM

## 2022-09-13 RX ORDER — BUMETANIDE 1 MG/1
TABLET ORAL
Qty: 30 TABLET | Refills: 0 | Status: SHIPPED | OUTPATIENT
Start: 2022-09-13

## 2022-09-20 ENCOUNTER — VIRTUAL VISIT (OUTPATIENT)
Dept: INTERNAL MEDICINE CLINIC | Age: 62
End: 2022-09-20
Payer: MEDICARE

## 2022-09-20 DIAGNOSIS — I10 ESSENTIAL HYPERTENSION: ICD-10-CM

## 2022-09-20 DIAGNOSIS — M54.16 LUMBAR RADICULOPATHY, ACUTE: Primary | ICD-10-CM

## 2022-09-20 PROCEDURE — G8756 NO BP MEASURE DOC: HCPCS | Performed by: INTERNAL MEDICINE

## 2022-09-20 PROCEDURE — 99213 OFFICE O/P EST LOW 20 MIN: CPT | Performed by: INTERNAL MEDICINE

## 2022-09-20 PROCEDURE — G8427 DOCREV CUR MEDS BY ELIG CLIN: HCPCS | Performed by: INTERNAL MEDICINE

## 2022-09-20 PROCEDURE — G8419 CALC BMI OUT NRM PARAM NOF/U: HCPCS | Performed by: INTERNAL MEDICINE

## 2022-09-20 PROCEDURE — G9899 SCRN MAM PERF RSLTS DOC: HCPCS | Performed by: INTERNAL MEDICINE

## 2022-09-20 PROCEDURE — G8510 SCR DEP NEG, NO PLAN REQD: HCPCS | Performed by: INTERNAL MEDICINE

## 2022-09-20 PROCEDURE — 3017F COLORECTAL CA SCREEN DOC REV: CPT | Performed by: INTERNAL MEDICINE

## 2022-09-20 NOTE — PROGRESS NOTES
Chief Complaint   Patient presents with    Follow-up     3 most recent PHQ Screens 9/20/2022   PHQ Not Done -   Little interest or pleasure in doing things Not at all   Feeling down, depressed, irritable, or hopeless Not at all   Total Score PHQ 2 0     1. Have you been to the ER, urgent care clinic since your last visit? Hospitalized since your last visit?no    2. Have you seen or consulted any other health care providers outside of the [address] [place] [address] since your last visit? Include any pap smears or colon screening.  no

## 2022-09-20 NOTE — PROGRESS NOTES
Tej Thayer is a 58 y.o. female being evaluated by a Virtual Visit (video visit) encounter to address concerns as mentioned above. A caregiver was present when appropriate. Due to this being a TeleHealth encounter (During [facility]-38 public health emergency), evaluation of the following organ systems was limited: Vitals/Constitutional/EENT/Resp/CV/GI//MS/Neuro/Skin/Heme-Lymph-Imm. Pursuant to the emergency declaration under the [address] [place] [address] and the [employer] Act, this Virtual Visit was conducted with patient's (and/or legal guardian's) consent, to reduce the risk of exposure to COVID-19 and provide necessary medical care. Services were provided through a video synchronous discussion virtually to substitute for in-person encounter. --Ronny Garcia MD on 9/20/2022 at 3:51 PM    An electronic signature was used to authenticate this note. Tej Thayer is a 58 y.o. female and presents with Follow-up  . Subjective:  She states the neurontin helps her neuropathy little at the current dose. Hypertension Review:  The patient has essential hypertension  Diet and Lifestyle: generally follows a  low sodium diet, exercises sporadically  Home BP Monitoring: is not measured at home. Pertinent ROS: taking medications as instructed, no medication side effects noted, no TIA's, no chest pain on exertion, no dyspnea on exertion, no swelling of ankles. Review of Systems  Constitutional: negative for fevers, chills, anorexia and weight loss  Eyes:   negative for visual disturbance and irritation  ENT:   negative for tinnitus,sore throat,nasal congestion,ear pains. hoarseness  Respiratory:  negative for cough, hemoptysis, dyspnea,wheezing  CV:   negative for chest pain, palpitations, lower extremity edema  GI:   negative for nausea, vomiting, diarrhea, abdominal pain,melena  Endo: negative for polyuria,polydipsia,polyphagia,heat intolerance  Genitourinary: negative for frequency, dysuria and hematuria  Integument:  negative for rash and pruritus  Hematologic:  negative for easy bruising and gum/nose bleeding  Musculoskel: negative for myalgias, arthralgias, back pain, muscle weakness, joint pain  Neurological:  negative for headaches, dizziness, vertigo, memory problems and gait   Behavl/Psych: negative for feelings of anxiety, depression, mood changes    Past Medical History:   Diagnosis Date    Anemia, unspecified 2/21/2011    Arthritis, Degenerative,Knee 2/21/2011    Degenerative Disc Disease 2/21/2011    Discoid lupus 2/21/2011    Essential hypertension, benign 2/21/2011    GERD, Gastro-Esophageal Reflux Disease 2/21/2011    Osteoarthritis 2/21/2011    Osteoartritis 2/21/2011    Tendonitis 2/21/2011    Xerosis 2/21/2011     No past surgical history on file. Social History     Socioeconomic History    Marital status: UNKNOWN   Tobacco Use    Smoking status: Former    Smokeless tobacco: Never   Vaping Use    Vaping Use: Never used   Substance and Sexual Activity    Alcohol use: Yes     Alcohol/week: 0.8 standard drinks     Types: 1 Glasses of wine per week     Comment: SOCIALLY    Drug use: No    Sexual activity: Never     Family History   Problem Relation Age of Onset    Diabetes Mother     Hypertension Mother     Hypertension Father     Cancer Father      Current Outpatient Medications   Medication Sig Dispense Refill    bumetanide (BUMEX) 1 mg tablet Take 1 tablet by mouth once daily 30 Tablet 0    losartan (COZAAR) 100 mg tablet Take 1 tablet by mouth once daily 90 Tablet 0    amLODIPine (NORVASC) 10 mg tablet Take 1 Tablet by mouth daily. 90 Tablet 3    gabapentin (NEURONTIN) 100 mg capsule Take 1 Capsule by mouth three (3) times daily.  Max Daily Amount: 300 mg. 90 Capsule 0    famotidine (PEPCID) 40 mg tablet Take 1 tablet by mouth once daily 90 Tablet 0    famotidine (PEPCID) 40 mg tablet Si tab po daily 90 Tablet 3    latanoprost (XALATAN) 0.005 % ophthalmic solution Administer 1 Drop to both eyes nightly. 2.5 mL 12    naproxen (NAPROSYN) 500 mg tablet TAKE 1 TABLET BY MOUTH TWICE DAILY WITH MEALS 60 Tablet 0    cloNIDine HCL (CATAPRES) 0.2 mg tablet TAKE 1 TABLET BY MOUTH AT BEDTIME 90 Tablet 0    clobetasoL (TEMOVATE) 0.05 % ointment Apply  to affected area two (2) times a day. 45 g 3    mycophenolate (CELLCEPT) 500 mg tablet       ipratropium (ATROVENT) 42 mcg (0.06 %) nasal spray 2 Sprays by Both Nostrils route four (4) times daily. 15 mL 3    cholecalciferol (VITAMIN D3) 25 mcg (1,000 unit) cap Take 2,000 Units by mouth daily. fluticasone propionate (FLONASE) 50 mcg/actuation nasal spray 2 Sprays by Both Nostrils route daily. 1 Bottle 12    potassium chloride (KLOR-CON) 10 mEq tablet Take 2 Tablets by mouth daily. 60 Tablet 12     No Known Allergies    Objective: There were no vitals taken for this visit. Physical Exam:   General appearance - alert, well appearing, and in no distress  Mental status - alert, oriented to person, place, and time  EYE-KASEY, EOMI, corneas normal, no foreign bodies  ENT-ENT exam normal, no neck nodes or sinus tenderness  Nose - normal and patent, no erythema, discharge or polyps  Mouth - mucous membranes moist, pharynx normal without lesions  Skin-Warm and dry. no hyperpigmentation, vitiligo, or suspicious lesions  Neuro -alert, oriented, normal speech, no focal findings or movement disorder noted  Neck-normal C-spine, no tenderness, full ROM without pain        Results for orders placed or performed in visit on 22   AMB POC LIPID PROFILE   Result Value Ref Range    Cholesterol (POC) 175     Triglycerides (POC) 101     HDL Cholesterol (POC) 37     LDL Cholesterol (POC) 118 MG/DL    Non-HDL Goal (POC) 138     TChol/HDL Ratio (POC) 4.7        Assessment/Plan:    ICD-10-CM ICD-9-CM    1. Lumbar radiculopathy, acute  M54.16 724.4       2. Essential hypertension  I10 401.9         No orders of the defined types were placed in this encounter. call if any problems,Take 81mg aspirin daily  There are no Patient Instructions on file for this visit. I have reviewed with the patient details of the assessment and plan and all questions were answered. Relevent patient education was performed. The most recent lab findings were reviewed with the patient. An After Visit Summary was printed and given to the patient.

## 2022-11-15 NOTE — TELEPHONE ENCOUNTER
Yazmin Angulo MD ,    Olamide Starr has been flagged for adherence by Bill. I have pended refills for your approval.    IPO:   NOV:NA    Thank you,  Heather Richardson PharmD, [address] [place] [address], toll free: [phone] Option 2    Requested Prescriptions     Pending Prescriptions Disp Refills    losartan (COZAAR) 100 mg tablet 90 Tablet 1     Sig: Take 1 Tablet by mouth daily.        ================================================================================    [facility]: ADHERENCE REVIEW  Identified care gap per [employer] fills at [facility]: ACE/ARB adherence    Last Visit: VV 2022    Patient identified as LIS = 1, therefore patient may be able to receive a 90-day supply for the same cost as a 30-day supply    ASSESSMENT  ACE/ARB ADHERENCE    Insurance Records claims through 11/15/2022 ( [place] = na%;  [facility] [place] =  80%; Potential Fail Date: 2022 ):   Losartan last filled on 7/3/2022 for 90 day supply. Next refill due: 10/1/2022-past due 45 days   0 refills remaining. Billed through [facility]    BP Readings from Last 3 Encounters:   22 130/70   22 (!) 140/70   21 (!) 169/82     CrCl cannot be calculated (Patient's most recent lab result is older than the maximum 180 days allowed. ). PLAN  The following are interventions that have been identified:  - Patient overdue refilling Losartan and active on home medication list  - Patient needs refills for Losartan    LM letter sent    No future appointments.     Heather Richardson PharmD, [address] [facility], toll free: [phone] Option 2  For Pharmacy [address] [facility] in place: No  Recommendation Provided To: Provider: 1 via Note to Provider   Intervention Detail: Adherence Monitorin and New Rx: 1, reason: Improve Adherence  Gap Closed?: No  Intervention Accepted By: Provider: 1  Time Spent (min): 15

## 2022-11-15 NOTE — LETTER
[address]  [address], [address]        [address] [id number]           11/18/22     Dear Ana Santos,    We tried to reach you recently regarding your Losartan, but were unable to reach you on the telephone. If you are no longer taking or taking differently, please call us at [phone] Option 2, so that we can discuss this and update your medication profile. This [facility] is filled and [facility] you [facility] [address] [facility] [address] Phone: [phone]   Please pick this medication up as soon as possible, if you have not already done so. It appears that this medication has not been filled at proper times. We are worried you might be missing doses or not taking it as directed. It is important that you take your medications regularly and try not to miss a single dose.     Some ways to help you remember to take and refill your medications are to:  · Use a pill box, set an alarm, and/or keep your medication near something that you do every day  · Fill a 3-month supply of your prescription at a time to save you time and trips to the pharmacy - if you would like assistance in switching your prescriptions to a 3-month supply, please contact us [phone] Option 2,  · Ask your pharmacy if they participate in [facility]", a program where you can  all of your medications on the same day  · Ask your pharmacy if you can be set up with automatic refill, where they will automatically refill your prescription when it is due and let you know it's ready to     Sincerely,   Macrina Silva, PharmD, [address] [facility], toll free: [phone] Option 2

## 2022-11-16 RX ORDER — LOSARTAN POTASSIUM 100 MG/1
100 TABLET ORAL DAILY
Qty: 90 TABLET | Refills: 1 | Status: SHIPPED | OUTPATIENT
Start: 2022-11-16

## 2022-12-06 DIAGNOSIS — M50.30 DEGENERATION OF CERVICAL INTERVERTEBRAL DISC: ICD-10-CM

## 2022-12-06 RX ORDER — NAPROXEN 500 MG/1
TABLET ORAL
Qty: 60 TABLET | Refills: 0 | Status: SHIPPED | OUTPATIENT
Start: 2022-12-06

## 2023-01-09 RX ORDER — CLONIDINE HYDROCHLORIDE 0.2 MG/1
TABLET ORAL
Qty: 90 TABLET | Refills: 0 | Status: SHIPPED | OUTPATIENT
Start: 2023-01-09

## 2023-01-11 ENCOUNTER — VIRTUAL VISIT (OUTPATIENT)
Dept: INTERNAL MEDICINE CLINIC | Age: 63
End: 2023-01-11
Payer: MEDICARE

## 2023-01-11 DIAGNOSIS — I10 ESSENTIAL HYPERTENSION: Primary | ICD-10-CM

## 2023-01-11 DIAGNOSIS — L93.0 DISCOID LUPUS: ICD-10-CM

## 2023-01-11 PROCEDURE — 99213 OFFICE O/P EST LOW 20 MIN: CPT | Performed by: INTERNAL MEDICINE

## 2023-01-11 PROCEDURE — 3017F COLORECTAL CA SCREEN DOC REV: CPT | Performed by: INTERNAL MEDICINE

## 2023-01-11 PROCEDURE — G8427 DOCREV CUR MEDS BY ELIG CLIN: HCPCS | Performed by: INTERNAL MEDICINE

## 2023-01-11 PROCEDURE — G8432 DEP SCR NOT DOC, RNG: HCPCS | Performed by: INTERNAL MEDICINE

## 2023-01-11 PROCEDURE — G8419 CALC BMI OUT NRM PARAM NOF/U: HCPCS | Performed by: INTERNAL MEDICINE

## 2023-01-11 NOTE — PROGRESS NOTES
Forrest Maddox is a 58 y.o. female being evaluated by a Virtual Visit (video visit) encounter to address concerns as mentioned above. A caregiver was present when appropriate. Due to this being a TeleHealth encounter (During [facility]- public health emergency), evaluation of the following organ systems was limited: Vitals/Constitutional/EENT/Resp/CV/GI//MS/Neuro/Skin/Heme-Lymph-Imm. Pursuant to the emergency declaration under the [address] [place] [address], [address] [place] [address] and the [employer] Act, this Virtual Visit was conducted with patient's (and/or legal guardian's) consent, to reduce the risk of exposure to COVID-19 and provide necessary medical care. Services were provided through a video synchronous discussion virtually to substitute for in-person encounter. --Kimmy Doss MD on 1/11/2023 at 2:14 PM    An electronic signature was used to authenticate this note. Forrest Maddox is a 58 y.o. female and presents with Lupus  . Subjective:    She needs a follow up evaluation    Hypertension Review:  The patient has hypertension . Diet and Lifestyle: generally follows a low sodium diet, exercises sporadically  Home BP Monitoring: is not measured at home. Pertinent ROS: taking medications as instructed, no medication side effects noted, no TIA's, no chest pain on exertion, no dyspnea on exertion, no swelling of ankles. Dyslipidemia Review:  Patient presents for evaluation of lipids. Compliance with treatment thus far has been excellent. A repeat fasting lipid profile was done. The patient does not use medications that may worsen dyslipidemias . The patient exercises sporadically.       Review of Systems  Constitutional: negative for fevers, chills, anorexia and weight loss  Eyes:   negative for visual disturbance and irritation  ENT:   negative for tinnitus,sore throat,nasal congestion,ear pains.hoarseness  Respiratory:  negative for cough, hemoptysis, dyspnea,wheezing  CV:   negative for chest pain, palpitations, lower extremity edema  GI:   negative for nausea, vomiting, diarrhea, abdominal pain,melena  Endo:               negative for polyuria,polydipsia,polyphagia,heat intolerance  Genitourinary: negative for frequency, dysuria and hematuria  Integument:  negative for rash and pruritus  Hematologic:  negative for easy bruising and gum/nose bleeding  Musculoskel: negative for myalgias, arthralgias, back pain, muscle weakness, joint pain  Neurological:  headaches,  Behavl/Psych: negative for feelings of anxiety, depression, mood changes    Past Medical History:   Diagnosis Date    Anemia, unspecified 2/21/2011    Arthritis, Degenerative,Knee 2/21/2011    Degenerative Disc Disease 2/21/2011    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 No pertinent surgical history. Social History     Socioeconomic History    Marital status: UNKNOWN   Tobacco Use    Smoking status: Former    Smokeless tobacco: Never   Vaping Use    Vaping Use: Never used   Substance and Sexual Activity    Alcohol use: Yes     Alcohol/week: 0.8 standard drinks     Types: 1 Glasses of wine per week     Comment: SOCIALLY    Drug use: No    Sexual activity: Never     Family History   Problem Relation Age of Onset    Diabetes Mother     Hypertension Mother     Hypertension Father     Cancer Father      Current Outpatient Medications   Medication Sig Dispense Refill    cloNIDine HCL (CATAPRES) 0.2 mg tablet TAKE 1 TABLET BY MOUTH AT BEDTIME 90 Tablet 0    naproxen (NAPROSYN) 500 mg tablet TAKE 1 TABLET BY MOUTH TWICE DAILY WITH MEALS 60 Tablet 0    losartan (COZAAR) 100 mg tablet Take 1 Tablet by mouth daily.  90 Tablet 1    bumetanide (BUMEX) 1 mg tablet Take 1 tablet by mouth once daily 30 Tablet 0    amLODIPine (NORVASC) 10 mg tablet Take 1 Tablet by mouth daily. 90 Tablet 3    gabapentin (NEURONTIN) 100 mg capsule Take 1 Capsule by mouth three (3) times daily. Max Daily Amount: 300 mg. 90 Capsule 0    famotidine (PEPCID) 40 mg tablet Take 1 tablet by mouth once daily 90 Tablet 0    famotidine (PEPCID) 40 mg tablet Si tab po daily 90 Tablet 3    latanoprost (XALATAN) 0.005 % ophthalmic solution Administer 1 Drop to both eyes nightly. 2.5 mL 12    clobetasoL (TEMOVATE) 0.05 % ointment Apply  to affected area two (2) times a day. 45 g 3    mycophenolate (CELLCEPT) 500 mg tablet       potassium chloride (KLOR-CON) 10 mEq tablet Take 2 Tablets by mouth daily. 60 Tablet 12    ipratropium (ATROVENT) 42 mcg (0.06 %) nasal spray 2 Sprays by Both Nostrils route four (4) times daily. 15 mL 3    cholecalciferol (VITAMIN D3) 25 mcg (1,000 unit) cap Take 2,000 Units by mouth daily. fluticasone propionate (FLONASE) 50 mcg/actuation nasal spray 2 Sprays by Both Nostrils route daily. 1 Bottle 12     No Known Allergies    Objective: There were no vitals taken for this visit. Physical Exam:   General appearance - alert, well appearing, and in no distress  Mental status - alert, oriented to person, place, and time  EYE-KASEY, EOMI, corneas normal, no foreign bodies  ENT-ENT exam normal, no neck nodes or sinus tenderness  Nose - normal and patent, no erythema, discharge or polyps  Mouth - mucous membranes moist, pharynx normal without lesions  Skin-Warm and dry.  no hyperpigmentation, vitiligo, or suspicious lesions  Neuro -alert, oriented, normal speech, no focal findings or movement disorder noted  Neck-normal C-spine, no tenderness, full ROM without pain        Results for orders placed or performed in visit on 22   AMB POC LIPID PROFILE   Result Value Ref Range    Cholesterol (POC) 175     Triglycerides (POC) 101     HDL Cholesterol (POC) 37     LDL Cholesterol (POC) 118 MG/DL    Non-HDL Goal (POC) 138     TChol/HDL Ratio (POC) 4.7        Assessment/Plan:    ICD-10-CM ICD-9-CM    1. Essential hypertension  I10 401.9       2. Discoid lupus  L93.0 695.4         No orders of the defined types were placed in this encounter. call if any problems,Take 81mg aspirin daily  Patient Instructions   MyChart Activation    Thank you for requesting access to Gazelle. Please follow the instructions below to securely access and download your online medical record. Gazelle allows you to send messages to your doctor, view your test results, renew your prescriptions, schedule appointments, and more. How Do I Sign Up? In your internet browser, go to [email or web address]  Click on the First Time User? Click Here link in the Sign In box. You will be redirect to the New Member Sign Up page. Enter your Gazelle Access Code exactly as it appears below. You will not need to use this code after youve completed the sign-up process. If you do not sign up before the expiration date, you must request a new code. Gazelle Access Code: [email or web address]  Expires: 2023  1:58 PM (This is the date your Gazelle access code will )    Enter the last four digits of your Social Security Number (xxxx) and Date of Birth (mm/dd/yyyy) as indicated and click Submit. You will be taken to the next sign-up page. Create a Gazelle ID. This will be your Gazelle login ID and cannot be changed, so think of one that is secure and easy to remember. Create a Gazelle password. You can change your password at any time. Enter your Password Reset Question and Answer. This can be used at a later time if you forget your password. Enter your e-mail address. You will receive e-mail notification when new information is available in [address] Click Sign Up. You can now view and download portions of your medical record.   Click the [place] link to download a portable copy of your medical information. Additional Information    If you have questions, please visit the Frequently Asked Questions section of the [facility] website at [email or web address] Remember, All[facility]est is NOT to be used for urgent needs. For medical emergencies, dial 911. Follow-up and Dispositions    Return in about 3 months (around 4/11/2023), or if symptoms worsen or fail to improve. I have reviewed with the patient details of the assessment and plan and all questions were answered. Relevent patient education was performed. The most recent lab findings were reviewed with the patient. An After Visit Summary was printed and given to the patient.

## 2023-01-11 NOTE — PATIENT INSTRUCTIONS
Cureeo Activation    Thank you for requesting access to C[facility]eo. Please follow the instructions below to securely access and download your online medical record. Cureeo allows you to send messages to your doctor, view your test results, renew your prescriptions, schedule appointments, and more. How Do I Sign Up? In your internet browser, go to [email or web address]  Click on the First Time User? Click Here link in the Sign In box. You will be redirect to the New Member Sign Up page. Enter your Cureeo Access Code exactly as it appears below. You will not need to use this code after youve completed the sign-up process. If you do not sign up before the expiration date, you must request a new code. Cureeo Access Code: [email or web address]  Expires: 2023  1:58 PM (This is the date your Cureeo access code will )    Enter the last four digits of your Social Security Number (xxxx) and Date of Birth (mm/dd/yyyy) as indicated and click Submit. You will be taken to the next sign-up page. Create a Cureeo ID. This will be your Cureeo login ID and cannot be changed, so think of one that is secure and easy to remember. Create a Cureeo password. You can change your password at any time. Enter your Password Reset Question and Answer. This can be used at a later time if you forget your password. Enter your e-mail address. You will receive e-mail notification when new information is available in [address] Click Sign Up. You can now view and download portions of your medical record. Click the [place] link to download a portable copy of your medical information. Additional Information    If you have questions, please visit the Frequently Asked Questions section of the Cureeo website at [email or web address] Remember, Cureeo is NOT to be used for urgent needs. For medical emergencies, dial 911.

## 2023-01-11 NOTE — PROGRESS NOTES
Chief Complaint   Patient presents with    Lupus     3 most recent PHQ Screens 1/11/2023   PHQ Not Done -   Little interest or pleasure in doing things Not at all   Feeling down, depressed, irritable, or hopeless Not at all   Total Score PHQ 2 0     1. Have you been to the ER, urgent care clinic since your last visit? Hospitalized since your last visit? no    2. Have you seen or consulted any other health care providers outside of the [address] [place] [address] since your last visit? Include any pap smears or colon screening.  no

## 2023-02-17 DIAGNOSIS — R60.9 PERIPHERAL EDEMA: ICD-10-CM

## 2023-02-17 RX ORDER — BUMETANIDE 1 MG/1
TABLET ORAL
Qty: 30 TABLET | Refills: 0 | Status: SHIPPED | OUTPATIENT
Start: 2023-02-17

## 2023-05-17 RX ORDER — NAPROXEN 500 MG/1
TABLET ORAL
Qty: 60 TABLET | Refills: 0 | Status: SHIPPED | OUTPATIENT
Start: 2023-05-17

## 2023-05-17 NOTE — TELEPHONE ENCOUNTER
Last appointment: 04/12/2023 MD Dot Lopez   Next appointment: 07/12/2023 MD Chris Six   Previous refill encounter(s):   12/06/2022 Naprosyn #60     For Pharmacy Admin Tracking Only    Program: Medication Refill  Intervention Detail: New Rx: 1, reason: Patient Preference  Time Spent (min): 5      Requested Prescriptions     Pending Prescriptions Disp Refills    naproxen (NAPROSYN) 500 MG tablet [Pharmacy Med Name: Naproxen 500 MG Oral Tablet] 60 tablet 0     Sig: TAKE 1 TABLET BY MOUTH TWICE DAILY WITH MEALS

## 2023-06-20 NOTE — ED NOTES
TERRY Cornelius reviewed discharge instructions with the patient. The patient verbalized understanding. Patient ambulatory out of ED with steady gait. No further complaints noted. covid precautions

## 2023-07-13 RX ORDER — AMLODIPINE BESYLATE 10 MG/1
TABLET ORAL
Qty: 90 TABLET | Refills: 0 | Status: SHIPPED | OUTPATIENT
Start: 2023-07-13

## 2023-07-13 RX ORDER — BUMETANIDE 1 MG/1
TABLET ORAL
Qty: 90 TABLET | Refills: 0 | Status: SHIPPED | OUTPATIENT
Start: 2023-07-13

## 2023-07-13 NOTE — TELEPHONE ENCOUNTER
Last appointment: 04/12/2023 MD Raz Gonzalez   Next appointment: Nothing scheduled   Previous refill encounter(s):   06/06/2022 Norvasc #90 with 3 refills,   02/17/2023 Bumex #30     For Pharmacy Admin Tracking Only    Program: Medication Refill  Intervention Detail: New Rx: 2, reason: Patient Preference  Time Spent (min): 5      Requested Prescriptions     Pending Prescriptions Disp Refills    bumetanide (BUMEX) 1 MG tablet [Pharmacy Med Name: Bumetanide 1 MG Oral Tablet] 90 tablet 0     Sig: Take 1 tablet by mouth once daily    amLODIPine (NORVASC) 10 MG tablet [Pharmacy Med Name: amLODIPine Besylate 10 MG Oral Tablet] 90 tablet 0     Sig: Take 1 tablet by mouth once daily       ----- Message from Meghann Holden sent at 7/13/2023  8:54 AM EDT -----  Subject: Refill Request    QUESTIONS  Name of Medication? amLODIPine (NORVASC) 10 MG tablet  Patient-reported dosage and instructions? 10mg tablet, Take by mouth daily  How many days do you have left? 3  Preferred Pharmacy? [facility] phone number (if available)? [phone]  Additional Information for Provider? Patient is all out of one of her meds   and she has been trying to get her meds refilled since 7/3 but the   pharmacy did not notify her that since they were closing down soon that   they would not be refilling medication any longer. Please call this   medication in asap. Patient is also wanting to use this location as her   preferred location.   ---------------------------------------------------------------------------  --------------,  Name of Medication? bumetanide (BUMEX) 1 MG tablet  Patient-reported dosage and instructions? 1mg tablet, Take 1 tablet by   mouth once daily  How many days do you have left? 0  Preferred Pharmacy? [facility] phone number (if available)? [phone]  Additional Information for Provider?  Patient is all out of medication and   she has been trying to get her meds refilled since 7/3 but the

## 2023-09-05 ENCOUNTER — OFFICE VISIT (OUTPATIENT)
Facility: CLINIC | Age: 63
End: 2023-09-05
Payer: MEDICARE

## 2023-09-05 VITALS
SYSTOLIC BLOOD PRESSURE: 150 MMHG | RESPIRATION RATE: 16 BRPM | HEART RATE: 56 BPM | DIASTOLIC BLOOD PRESSURE: 86 MMHG | WEIGHT: 188 LBS | BODY MASS INDEX: 26.92 KG/M2 | OXYGEN SATURATION: 99 % | TEMPERATURE: 98 F | HEIGHT: 70 IN

## 2023-09-05 DIAGNOSIS — Z11.59 ENCOUNTER FOR HEPATITIS C SCREENING TEST FOR LOW RISK PATIENT: ICD-10-CM

## 2023-09-05 DIAGNOSIS — I10 ESSENTIAL (PRIMARY) HYPERTENSION: Primary | ICD-10-CM

## 2023-09-05 DIAGNOSIS — J30.1 SEASONAL ALLERGIC RHINITIS DUE TO POLLEN: ICD-10-CM

## 2023-09-05 DIAGNOSIS — L93.0 DISCOID LUPUS ERYTHEMATOSUS: ICD-10-CM

## 2023-09-05 DIAGNOSIS — K21.9 GASTRO-ESOPHAGEAL REFLUX DISEASE WITHOUT ESOPHAGITIS: ICD-10-CM

## 2023-09-05 DIAGNOSIS — M17.0 PRIMARY OSTEOARTHRITIS OF BOTH KNEES: ICD-10-CM

## 2023-09-05 PROCEDURE — 1036F TOBACCO NON-USER: CPT | Performed by: INTERNAL MEDICINE

## 2023-09-05 PROCEDURE — 3079F DIAST BP 80-89 MM HG: CPT | Performed by: INTERNAL MEDICINE

## 2023-09-05 PROCEDURE — G8427 DOCREV CUR MEDS BY ELIG CLIN: HCPCS | Performed by: INTERNAL MEDICINE

## 2023-09-05 PROCEDURE — 3017F COLORECTAL CA SCREEN DOC REV: CPT | Performed by: INTERNAL MEDICINE

## 2023-09-05 PROCEDURE — 3077F SYST BP >= 140 MM HG: CPT | Performed by: INTERNAL MEDICINE

## 2023-09-05 PROCEDURE — G8419 CALC BMI OUT NRM PARAM NOF/U: HCPCS | Performed by: INTERNAL MEDICINE

## 2023-09-05 PROCEDURE — 99214 OFFICE O/P EST MOD 30 MIN: CPT | Performed by: INTERNAL MEDICINE

## 2023-09-05 RX ORDER — POTASSIUM CHLORIDE 750 MG/1
10 TABLET, EXTENDED RELEASE ORAL DAILY
Qty: 60 TABLET | Refills: 12 | Status: SHIPPED | OUTPATIENT
Start: 2023-09-05

## 2023-09-05 ASSESSMENT — PATIENT HEALTH QUESTIONNAIRE - PHQ9
SUM OF ALL RESPONSES TO PHQ QUESTIONS 1-9: 0
SUM OF ALL RESPONSES TO PHQ QUESTIONS 1-9: 0
SUM OF ALL RESPONSES TO PHQ9 QUESTIONS 1 & 2: 0
2. FEELING DOWN, DEPRESSED OR HOPELESS: 0
SUM OF ALL RESPONSES TO PHQ QUESTIONS 1-9: 0
1. LITTLE INTEREST OR PLEASURE IN DOING THINGS: 0
SUM OF ALL RESPONSES TO PHQ QUESTIONS 1-9: 0

## 2023-09-06 LAB
HCV AB SER IA-ACNC: 0.13 INDEX
HCV AB SERPL QL IA: NONREACTIVE

## 2023-09-26 ENCOUNTER — OFFICE VISIT (OUTPATIENT)
Facility: CLINIC | Age: 63
End: 2023-09-26
Payer: MEDICARE

## 2023-09-26 ENCOUNTER — TELEPHONE (OUTPATIENT)
Facility: CLINIC | Age: 63
End: 2023-09-26

## 2023-09-26 VITALS
HEIGHT: 70 IN | OXYGEN SATURATION: 94 % | HEART RATE: 60 BPM | RESPIRATION RATE: 19 BRPM | BODY MASS INDEX: 27.49 KG/M2 | DIASTOLIC BLOOD PRESSURE: 78 MMHG | SYSTOLIC BLOOD PRESSURE: 120 MMHG | TEMPERATURE: 98 F | WEIGHT: 192 LBS

## 2023-09-26 DIAGNOSIS — I10 ESSENTIAL (PRIMARY) HYPERTENSION: Primary | ICD-10-CM

## 2023-09-26 DIAGNOSIS — L93.0 DISCOID LUPUS ERYTHEMATOSUS: ICD-10-CM

## 2023-09-26 DIAGNOSIS — J30.1 SEASONAL ALLERGIC RHINITIS DUE TO POLLEN: ICD-10-CM

## 2023-09-26 PROCEDURE — 99214 OFFICE O/P EST MOD 30 MIN: CPT | Performed by: INTERNAL MEDICINE

## 2023-09-26 PROCEDURE — 3074F SYST BP LT 130 MM HG: CPT | Performed by: INTERNAL MEDICINE

## 2023-09-26 PROCEDURE — G8419 CALC BMI OUT NRM PARAM NOF/U: HCPCS | Performed by: INTERNAL MEDICINE

## 2023-09-26 PROCEDURE — 3017F COLORECTAL CA SCREEN DOC REV: CPT | Performed by: INTERNAL MEDICINE

## 2023-09-26 PROCEDURE — 3078F DIAST BP <80 MM HG: CPT | Performed by: INTERNAL MEDICINE

## 2023-09-26 PROCEDURE — 1036F TOBACCO NON-USER: CPT | Performed by: INTERNAL MEDICINE

## 2023-09-26 PROCEDURE — G8427 DOCREV CUR MEDS BY ELIG CLIN: HCPCS | Performed by: INTERNAL MEDICINE

## 2023-09-26 SDOH — ECONOMIC STABILITY: FOOD INSECURITY: WITHIN THE PAST 12 MONTHS, YOU WORRIED THAT YOUR FOOD WOULD RUN OUT BEFORE YOU GOT MONEY TO BUY MORE.: NEVER TRUE

## 2023-09-26 SDOH — ECONOMIC STABILITY: INCOME INSECURITY: HOW HARD IS IT FOR YOU TO PAY FOR THE VERY BASICS LIKE FOOD, HOUSING, MEDICAL CARE, AND HEATING?: NOT VERY HARD

## 2023-09-26 SDOH — ECONOMIC STABILITY: FOOD INSECURITY: WITHIN THE PAST 12 MONTHS, THE FOOD YOU BOUGHT JUST DIDN'T LAST AND YOU DIDN'T HAVE MONEY TO GET MORE.: NEVER TRUE

## 2023-09-26 SDOH — ECONOMIC STABILITY: HOUSING INSECURITY
IN THE LAST 12 MONTHS, WAS THERE A TIME WHEN YOU DID NOT HAVE A STEADY PLACE TO SLEEP OR SLEPT IN A SHELTER (INCLUDING NOW)?: YES

## 2023-09-26 ASSESSMENT — PATIENT HEALTH QUESTIONNAIRE - PHQ9
2. FEELING DOWN, DEPRESSED OR HOPELESS: 0
SUM OF ALL RESPONSES TO PHQ QUESTIONS 1-9: 0
DEPRESSION UNABLE TO ASSESS: FUNCTIONAL CAPACITY MOTIVATION LIMITS ACCURACY
SUM OF ALL RESPONSES TO PHQ QUESTIONS 1-9: 0
SUM OF ALL RESPONSES TO PHQ9 QUESTIONS 1 & 2: 0
1. LITTLE INTEREST OR PLEASURE IN DOING THINGS: 0

## 2023-09-26 ASSESSMENT — ANXIETY QUESTIONNAIRES
1. FEELING NERVOUS, ANXIOUS, OR ON EDGE: 0
GAD7 TOTAL SCORE: 0
5. BEING SO RESTLESS THAT IT IS HARD TO SIT STILL: 0
7. FEELING AFRAID AS IF SOMETHING AWFUL MIGHT HAPPEN: 0
2. NOT BEING ABLE TO STOP OR CONTROL WORRYING: 0
IF YOU CHECKED OFF ANY PROBLEMS ON THIS QUESTIONNAIRE, HOW DIFFICULT HAVE THESE PROBLEMS MADE IT FOR YOU TO DO YOUR WORK, TAKE CARE OF THINGS AT HOME, OR GET ALONG WITH OTHER PEOPLE: NOT DIFFICULT AT ALL
4. TROUBLE RELAXING: 0
3. WORRYING TOO MUCH ABOUT DIFFERENT THINGS: 0
6. BECOMING EASILY ANNOYED OR IRRITABLE: 0

## 2023-09-26 NOTE — TELEPHONE ENCOUNTER
[facility] CLINICAL PHARMACY: ADHERENCE REVIEW  Identified care gap per [employer]: fills at [facility] : ACE/ARB adherence    ASSESSMENT    ACE/ARB ADHERENCE    Insurance Records claims through  23  (Prior Year  [facility] [address] = not reported; YTD [address] = FIRST FILL; Potential Fail Date: 10.03.23):   Losartan 100 mg last filled on 23 for 90 day supply. Next refill due: 23 -PAST DUE 38 DAYS     Prescribed si tablet/capsule daily    Per Insurer Portal: last filled on 23 for 90 day supply. Per [facility]: last picked up on 23 for 90 day supply. Billed through [place] [facility] 0 refills remaining. BP Readings from Last 3 Encounters:   23 (!) 150/86   23 138/70   22 130/70     Estimated Creatinine Clearance: 80 mL/min (based on SCr of 0.85 mg/dL). Lab Results   Component Value Date    CREATININE 0.85 2023     Lab Results   Component Value Date    K 3.4 (L) 2023       PLAN    Per insurer report, LIS-1 - may be able to receive up to a 100-day supply for the same cost as a 30-day supply    The following are interventions that have been identified:   Patient overdue refilling LOSARTAN POT  MG and active on home medication list.   Patient needs refills for LOSARTAN POT  MG .  Pt has appt today, 23    Last Visit: 23  Next Visit: 23    Veria Sever, [address] [place] [address]   [phone] option 2

## 2023-09-26 NOTE — PROGRESS NOTES
Jerome Jacobo is a 61 y.o. female and presents with Follow-up ([facility])  . Subjective:  She was recently seen at [facility] and found to have a viral type syndrome with some abnormal lab levels. Hypertension Review:  The patient has essential hypertension  Diet and Lifestyle: generally follows a  low sodium diet, exercises sporadically  Home BP Monitoring: is not measured at home. Pertinent ROS: taking medications as instructed, no medication side effects noted, no TIA's, no chest pain on exertion, no dyspnea on exertion, no swelling of ankles. Dyslipidemia Review:  Patient presents for evaluation of lipids. Compliance with treatment thus far has been excellent. A repeat fasting lipid profile was not done. The patient does not use medications that may worsen dyslipidemias (corticosteroids, progestins, anabolic steroids, amiodarone, cyclosporine, olanzapine). The patient exercises some    Vitamin D Deficiency Review   The patient has a previous history of vitamin d deficiency  The patient is on medication for vitamin d deficiency  The patient has denied any recent falls or fractures        Review of Systems  Constitutional: negative for fevers, chills, anorexia and weight loss  Eyes:   negative for visual disturbance and irritation  ENT:   negative for tinnitus,sore throat,nasal congestion,ear pains. hoarseness  Respiratory:  negative for cough, hemoptysis, dyspnea,wheezing  CV:   negative for chest pain, palpitations, lower extremity edema  GI:   negative for nausea, vomiting, diarrhea, abdominal pain,melena  Endo:               negative for polyuria,polydipsia,polyphagia,heat intolerance  Genitourinary: negative for frequency, dysuria and hematuria  Integument:  negative for rash and pruritus  Hematologic:  negative for easy bruising and gum/nose bleeding  Musculoskel: negative for myalgias, arthralgias, back pain, muscle weakness, joint pain  Neurological:  negative for headaches,

## 2023-09-26 NOTE — PROGRESS NOTES
1. Have you been to the ER, urgent care clinic since your last visit? Hospitalized since your last visit? no    2. Have you seen or consulted any other health care providers outside of the [address] [place] [address] since your last visit? Include any pap smears or colon screening.  no     Chief Complaint   Patient presents with    Follow-up     [facility]         PHQ-9 Total Score: 0 (9/26/2023  3:04 PM)

## 2023-09-27 LAB
ALBUMIN SERPL-MCNC: 3.7 G/DL (ref 3.5–5)
ALBUMIN/GLOB SERPL: 0.8 (ref 1.1–2.2)
ALP SERPL-CCNC: 135 U/L (ref 45–117)
ALT SERPL-CCNC: 19 U/L (ref 12–78)
ANION GAP SERPL CALC-SCNC: 3 MMOL/L (ref 5–15)
AST SERPL-CCNC: 15 U/L (ref 15–37)
BILIRUB SERPL-MCNC: 0.6 MG/DL (ref 0.2–1)
BUN SERPL-MCNC: 8 MG/DL (ref 6–20)
BUN/CREAT SERPL: 10 (ref 12–20)
CALCIUM SERPL-MCNC: 9.3 MG/DL (ref 8.5–10.1)
CHLORIDE SERPL-SCNC: 105 MMOL/L (ref 97–108)
CO2 SERPL-SCNC: 30 MMOL/L (ref 21–32)
CREAT SERPL-MCNC: 0.8 MG/DL (ref 0.55–1.02)
ERYTHROCYTE [DISTWIDTH] IN BLOOD BY AUTOMATED COUNT: 14.6 % (ref 11.5–14.5)
GLOBULIN SER CALC-MCNC: 4.6 G/DL (ref 2–4)
GLUCOSE SERPL-MCNC: 96 MG/DL (ref 65–100)
HCT VFR BLD AUTO: 35 % (ref 35–47)
HGB BLD-MCNC: 11 G/DL (ref 11.5–16)
MCH RBC QN AUTO: 28.3 PG (ref 26–34)
MCHC RBC AUTO-ENTMCNC: 31.4 G/DL (ref 30–36.5)
MCV RBC AUTO: 90 FL (ref 80–99)
NRBC # BLD: 0 K/UL (ref 0–0.01)
NRBC BLD-RTO: 0 PER 100 WBC
PLATELET # BLD AUTO: 164 K/UL (ref 150–400)
PMV BLD AUTO: 11.3 FL (ref 8.9–12.9)
POTASSIUM SERPL-SCNC: 3.8 MMOL/L (ref 3.5–5.1)
PROT SERPL-MCNC: 8.3 G/DL (ref 6.4–8.2)
RBC # BLD AUTO: 3.89 M/UL (ref 3.8–5.2)
SODIUM SERPL-SCNC: 138 MMOL/L (ref 136–145)
WBC # BLD AUTO: 3 K/UL (ref 3.6–11)

## 2023-09-27 NOTE — TELEPHONE ENCOUNTER
Followed up on refill request for Losartan 100 mg. Dr Aung Bronson did not order Losartan 100 mg at pts office visit on 9.26.23. Contacted [facility] but they did not answer.

## 2023-09-29 RX ORDER — LOSARTAN POTASSIUM 100 MG/1
100 TABLET ORAL DAILY
Qty: 100 TABLET | Refills: 3 | Status: SHIPPED | OUTPATIENT
Start: 2023-09-29

## 2023-09-29 NOTE — TELEPHONE ENCOUNTER
Patient returned call, stating she is taking losartan as directed without issues/concerns. Patient states she has roughly 2 wks remaining, but will  refill soon. Patient states she takes her pills everyday, and has extra because [facility] had given her extra when they were closing, so she would have enough to get her thru until the prescriptions were switched to the new pharmacy. Letter retracted.

## 2023-10-10 ENCOUNTER — TELEPHONE (OUTPATIENT)
Facility: CLINIC | Age: 63
End: 2023-10-10

## 2023-10-13 RX ORDER — ONDANSETRON 4 MG/1
4 TABLET, FILM COATED ORAL 3 TIMES DAILY PRN
Qty: 15 TABLET | Refills: 0 | Status: SHIPPED | OUTPATIENT
Start: 2023-10-13

## 2023-12-29 RX ORDER — AMLODIPINE BESYLATE 10 MG/1
TABLET ORAL
Qty: 90 TABLET | Refills: 0 | Status: SHIPPED | OUTPATIENT
Start: 2023-12-29

## 2023-12-29 RX ORDER — NAPROXEN 500 MG/1
500 TABLET ORAL 2 TIMES DAILY WITH MEALS
Qty: 60 TABLET | Refills: 0 | Status: SHIPPED | OUTPATIENT
Start: 2023-12-29

## 2024-01-10 ENCOUNTER — OFFICE VISIT (OUTPATIENT)
Facility: CLINIC | Age: 64
End: 2024-01-10
Payer: MEDICARE

## 2024-01-10 VITALS
OXYGEN SATURATION: 99 % | DIASTOLIC BLOOD PRESSURE: 80 MMHG | TEMPERATURE: 98 F | SYSTOLIC BLOOD PRESSURE: 126 MMHG | BODY MASS INDEX: 27.77 KG/M2 | HEART RATE: 99 BPM | RESPIRATION RATE: 16 BRPM | WEIGHT: 194 LBS | HEIGHT: 70 IN

## 2024-01-10 DIAGNOSIS — M17.12 PRIMARY OSTEOARTHRITIS OF LEFT KNEE: Primary | ICD-10-CM

## 2024-01-10 DIAGNOSIS — K21.9 GASTRO-ESOPHAGEAL REFLUX DISEASE WITHOUT ESOPHAGITIS: ICD-10-CM

## 2024-01-10 DIAGNOSIS — J30.1 SEASONAL ALLERGIC RHINITIS DUE TO POLLEN: ICD-10-CM

## 2024-01-10 DIAGNOSIS — I10 ESSENTIAL HYPERTENSION, BENIGN: ICD-10-CM

## 2024-01-10 PROCEDURE — G8419 CALC BMI OUT NRM PARAM NOF/U: HCPCS | Performed by: INTERNAL MEDICINE

## 2024-01-10 PROCEDURE — 3074F SYST BP LT 130 MM HG: CPT | Performed by: INTERNAL MEDICINE

## 2024-01-10 PROCEDURE — 3017F COLORECTAL CA SCREEN DOC REV: CPT | Performed by: INTERNAL MEDICINE

## 2024-01-10 PROCEDURE — G8484 FLU IMMUNIZE NO ADMIN: HCPCS | Performed by: INTERNAL MEDICINE

## 2024-01-10 PROCEDURE — G8428 CUR MEDS NOT DOCUMENT: HCPCS | Performed by: INTERNAL MEDICINE

## 2024-01-10 PROCEDURE — 3079F DIAST BP 80-89 MM HG: CPT | Performed by: INTERNAL MEDICINE

## 2024-01-10 PROCEDURE — 1036F TOBACCO NON-USER: CPT | Performed by: INTERNAL MEDICINE

## 2024-01-10 PROCEDURE — 99214 OFFICE O/P EST MOD 30 MIN: CPT | Performed by: INTERNAL MEDICINE

## 2024-01-10 SDOH — ECONOMIC STABILITY: HOUSING INSECURITY
IN THE LAST 12 MONTHS, WAS THERE A TIME WHEN YOU DID NOT HAVE A STEADY PLACE TO SLEEP OR SLEPT IN A SHELTER (INCLUDING NOW)?: NO

## 2024-01-10 SDOH — ECONOMIC STABILITY: INCOME INSECURITY: HOW HARD IS IT FOR YOU TO PAY FOR THE VERY BASICS LIKE FOOD, HOUSING, MEDICAL CARE, AND HEATING?: NOT HARD AT ALL

## 2024-01-10 SDOH — ECONOMIC STABILITY: FOOD INSECURITY: WITHIN THE PAST 12 MONTHS, YOU WORRIED THAT YOUR FOOD WOULD RUN OUT BEFORE YOU GOT MONEY TO BUY MORE.: NEVER TRUE

## 2024-01-10 SDOH — ECONOMIC STABILITY: FOOD INSECURITY: WITHIN THE PAST 12 MONTHS, THE FOOD YOU BOUGHT JUST DIDN'T LAST AND YOU DIDN'T HAVE MONEY TO GET MORE.: NEVER TRUE

## 2024-01-10 ASSESSMENT — PATIENT HEALTH QUESTIONNAIRE - PHQ9
SUM OF ALL RESPONSES TO PHQ QUESTIONS 1-9: 0
SUM OF ALL RESPONSES TO PHQ QUESTIONS 1-9: 0
2. FEELING DOWN, DEPRESSED OR HOPELESS: 0
1. LITTLE INTEREST OR PLEASURE IN DOING THINGS: 0
SUM OF ALL RESPONSES TO PHQ9 QUESTIONS 1 & 2: 0
SUM OF ALL RESPONSES TO PHQ QUESTIONS 1-9: 0
SUM OF ALL RESPONSES TO PHQ QUESTIONS 1-9: 0

## 2024-01-10 NOTE — PROGRESS NOTES
Dana Lopez is a 63 y.o. female and presents with Gastroesophageal Reflux and Hypertension  .  Subjective:  Knee pain Review:  Patient complains of left knee pain. Onset of the symptoms was months  ago. Inciting event: longstanding problem which has been getting worse. Current symptoms include pain location: both: medial and swelling. none,  Aggravating symptoms: going up and down stairs, walking, lateral movements, any weight bearing. Patient's overall course: gradually worsening Patient has had prior knee problems. Evaluation to date: none.  Treatment to date:NSAIDS          Hypertension Review:  The patient has hypertension .  Diet and Lifestyle: generally follows a low sodium diet, exercises sporadically  Home BP Monitoring: is not measured at home.  Pertinent ROS: taking medications as instructed, no medication side effects noted, no TIA's, no chest pain on exertion, no dyspnea on exertion, no swelling of ankles.    Dyslipidemia Review:  Patient presents for evaluation of lipids.  Compliance with treatment thus far has been excellent.  A repeat fasting lipid profile was done.  The patient does not use medications that may worsen dyslipidemias . The patient exercises sporadically.    Allergic Rhinitis  Patient presents for follow up evaluation of allergic symptoms. Denies any nasal congestion, rhinorrhea, sneezing, eye itching, watery eyes. Precipitants include pollen.  Treatment in the past has included intranasal steroids: .  Treatment currently includes intranasal steroids:  and are effective in the patient's opinion.    Vitamin D Deficiency Review   The patient has a previous history of vitamin d deficiency  The patient is on medication for vitamin d deficiency  The patient has denied any recent falls or fractures      Review of Systems  Constitutional: negative for fevers, chills, anorexia and weight loss  Eyes:   negative for visual disturbance and irritation  ENT:   negative for tinnitus,sore

## 2024-01-10 NOTE — PATIENT INSTRUCTIONS
Thank you for enrolling in [facility]. Please follow the instructions below to securely access your online medical record. Notet allows you to send messages to your doctor, view your test results, renew your prescriptions, schedule appointments, and more.     How Do I Sign Up?  In your Internet browser, go to [email or web address]  Click on the Sign Up Now link in the Sign In box. You will see the New Member Sign Up page.  Enter your Notet Access Code exactly as it appears below. You will not need to use this code after you’ve completed the sign-up process. If you do not sign up before the expiration date, you must request a new code.  [facility] Access Code: Activation code not generated  Current [facility] Status: Patient Declined    Enter your Social Security Number ([id number]) and Date of Birth (mm/dd/yyyy) as indicated and click Submit. You will be taken to the next sign-up page.  Create a [facility] ID. This will be your [facility] login ID and cannot be changed, so think of one that is secure and easy to remember.  Create a [facility] password. You can change your password at any time.  Enter your Password Reset Question and Answer. This can be used at a later time if you forget your password.   Enter your e-mail address. You will receive e-mail notification when new information is available in [facility].  Click Sign Up. You can now view your medical record.     Additional Information  If you have questions, please contact your physician practice where you receive care. Remember, [facility] is NOT to be used for urgent needs. For medical emergencies, dial 911.

## 2024-01-10 NOTE — PROGRESS NOTES
1. Have you been to the ER, urgent care clinic since your last visit?  Hospitalized since your last visit?No    2. Have you seen or consulted any other health care providers outside of the [facility] since your last visit?  Include any pap smears or colon screening. No     Chief Complaint   Patient presents with    Gastroesophageal Reflux    Hypertension         PHQ-9 Total Score: 0 (1/10/2024  1:39 PM)

## 2024-04-11 ENCOUNTER — OFFICE VISIT (OUTPATIENT)
Facility: CLINIC | Age: 64
End: 2024-04-11
Payer: MEDICARE

## 2024-04-11 VITALS
TEMPERATURE: 98 F | WEIGHT: 196 LBS | SYSTOLIC BLOOD PRESSURE: 160 MMHG | HEART RATE: 82 BPM | RESPIRATION RATE: 16 BRPM | OXYGEN SATURATION: 99 % | HEIGHT: 70 IN | BODY MASS INDEX: 28.06 KG/M2 | DIASTOLIC BLOOD PRESSURE: 102 MMHG

## 2024-04-11 DIAGNOSIS — K21.9 GASTRO-ESOPHAGEAL REFLUX DISEASE WITHOUT ESOPHAGITIS: ICD-10-CM

## 2024-04-11 DIAGNOSIS — L93.0 DISCOID LUPUS ERYTHEMATOSUS: ICD-10-CM

## 2024-04-11 DIAGNOSIS — I10 ESSENTIAL HYPERTENSION, BENIGN: ICD-10-CM

## 2024-04-11 DIAGNOSIS — J40 BRONCHITIS: Primary | ICD-10-CM

## 2024-04-11 PROCEDURE — G8427 DOCREV CUR MEDS BY ELIG CLIN: HCPCS | Performed by: INTERNAL MEDICINE

## 2024-04-11 PROCEDURE — 3077F SYST BP >= 140 MM HG: CPT | Performed by: INTERNAL MEDICINE

## 2024-04-11 PROCEDURE — 3080F DIAST BP >= 90 MM HG: CPT | Performed by: INTERNAL MEDICINE

## 2024-04-11 PROCEDURE — 3017F COLORECTAL CA SCREEN DOC REV: CPT | Performed by: INTERNAL MEDICINE

## 2024-04-11 PROCEDURE — G8419 CALC BMI OUT NRM PARAM NOF/U: HCPCS | Performed by: INTERNAL MEDICINE

## 2024-04-11 PROCEDURE — 99214 OFFICE O/P EST MOD 30 MIN: CPT | Performed by: INTERNAL MEDICINE

## 2024-04-11 PROCEDURE — 1036F TOBACCO NON-USER: CPT | Performed by: INTERNAL MEDICINE

## 2024-04-11 SDOH — ECONOMIC STABILITY: FOOD INSECURITY: WITHIN THE PAST 12 MONTHS, YOU WORRIED THAT YOUR FOOD WOULD RUN OUT BEFORE YOU GOT MONEY TO BUY MORE.: NEVER TRUE

## 2024-04-11 SDOH — ECONOMIC STABILITY: INCOME INSECURITY: HOW HARD IS IT FOR YOU TO PAY FOR THE VERY BASICS LIKE FOOD, HOUSING, MEDICAL CARE, AND HEATING?: NOT HARD AT ALL

## 2024-04-11 SDOH — ECONOMIC STABILITY: FOOD INSECURITY: WITHIN THE PAST 12 MONTHS, THE FOOD YOU BOUGHT JUST DIDN'T LAST AND YOU DIDN'T HAVE MONEY TO GET MORE.: NEVER TRUE

## 2024-04-11 ASSESSMENT — PATIENT HEALTH QUESTIONNAIRE - PHQ9
SUM OF ALL RESPONSES TO PHQ QUESTIONS 1-9: 0
2. FEELING DOWN, DEPRESSED OR HOPELESS: NOT AT ALL
SUM OF ALL RESPONSES TO PHQ9 QUESTIONS 1 & 2: 0
SUM OF ALL RESPONSES TO PHQ QUESTIONS 1-9: 0
SUM OF ALL RESPONSES TO PHQ QUESTIONS 1-9: 0
SUM OF ALL RESPONSES TO PHQ9 QUESTIONS 1 & 2: 0
1. LITTLE INTEREST OR PLEASURE IN DOING THINGS: NOT AT ALL
SUM OF ALL RESPONSES TO PHQ QUESTIONS 1-9: 0
2. FEELING DOWN, DEPRESSED OR HOPELESS: NOT AT ALL
1. LITTLE INTEREST OR PLEASURE IN DOING THINGS: NOT AT ALL

## 2024-04-11 NOTE — PATIENT INSTRUCTIONS
Thank you for enrolling in [employer]. Please follow the instructions below to securely access your online medical record. [employer] allows you to send messages to your doctor, view your test results, renew your prescriptions, schedule appointments, and more.     How Do I Sign Up?  In your Internet browser, go to [email or web address]  Click on the Sign Up Now link in the Sign In box. You will see the New Member Sign Up page.  Enter your [employer] Access Code exactly as it appears below. You will not need to use this code after you’ve completed the sign-up process. If you do not sign up before the expiration date, you must request a new code.  [employer] Access Code: Activation code not generated  Current [employer] Status: Patient Declined    Enter your Social Security Number ([id number]) and Date of Birth (mm/dd/yyyy) as indicated and click Submit. You will be taken to the next sign-up page.  Create a [employer] ID. This will be your [employer] login ID and cannot be changed, so think of one that is secure and easy to remember.  Create a [employer] password. You can change your password at any time.  Enter your Password Reset Question and Answer. This can be used at a later time if you forget your password.   Enter your e-mail address. You will receive e-mail notification when new information is available in [employer].  Click Sign Up. You can now view your medical record.     Additional Information  If you have questions, please contact your physician practice where you receive care. Remember, [employer] is NOT to be used for urgent needs. For medical emergencies, dial 911.

## 2024-04-11 NOTE — PROGRESS NOTES
1. Have you been to the ER, urgent care clinic since your last visit?  Hospitalized since your last visit?No    2. Have you seen or consulted any other health care providers outside of the [facility] since your last visit?  Include any pap smears or colon screening. No     Chief Complaint   Patient presents with    Gastroesophageal Reflux    Hypertension         PHQ-9 Total Score: 0 (4/11/2024  1:36 PM)

## 2024-04-11 NOTE — PROGRESS NOTES
Dana Lopez is a 64 y.o. female and presents with Gastroesophageal Reflux and Hypertension  .  Subjective:    Hypertension Review:  The patient has essential hypertension  Diet and Lifestyle: generally follows a  low sodium diet, exercises sporadically  Home BP Monitoring: is not measured at home.  Pertinent ROS: not taking medications as instructed, no medication side effects noted, no TIA's, no chest pain on exertion, no dyspnea on exertion, no swelling of ankles.     GERD Review:   Patient has a history of gastroesophageal reflux with heartburn. Symptoms have been present for a few months.  She denies dysphagia.  She  has not lost weight.  She denies melena, hematochezia, hematemesis, and coffee ground emesis.  This has been associated with fullness after meals.  She denies abdominal bloating and none.  Medical therapy in the past has included proton pump inhibitor    He states she had a cold 2 weeks ago and had a negative Covid test but is unable to smell.    Vitamin D Deficiency Review   The patient has a previous history of vitamin d deficiency  The patient is on medication for vitamin d deficiency  The patient has denied any recent falls or fractures        Review of Systems  Constitutional: negative for fevers, chills, anorexia and weight loss  Eyes:   negative for visual disturbance and irritation  ENT:   negative for tinnitus,sore throat,nasal congestion,ear pains.hoarseness  Respiratory:  negative for cough, hemoptysis, dyspnea,wheezing  CV:   negative for chest pain, palpitations, lower extremity edema  GI:   negative for nausea, vomiting, diarrhea, abdominal pain,melena  Endo:               negative for polyuria,polydipsia,polyphagia,heat intolerance  Genitourinary: negative for frequency, dysuria and hematuria  Integument:  negative for rash and pruritus  Hematologic:  negative for easy bruising and gum/nose bleeding  Musculoskel: negative for myalgias, arthralgias, back pain, muscle weakness,

## 2024-04-12 ENCOUNTER — HOSPITAL ENCOUNTER (OUTPATIENT)
Facility: HOSPITAL | Age: 64
End: 2024-04-12
Payer: MEDICARE

## 2024-04-12 DIAGNOSIS — J40 BRONCHITIS: ICD-10-CM

## 2024-04-12 PROCEDURE — 71046 X-RAY EXAM CHEST 2 VIEWS: CPT

## 2024-05-09 ENCOUNTER — OFFICE VISIT (OUTPATIENT)
Facility: CLINIC | Age: 64
End: 2024-05-09
Payer: MEDICARE

## 2024-05-09 VITALS
HEIGHT: 70 IN | RESPIRATION RATE: 16 BRPM | WEIGHT: 194 LBS | OXYGEN SATURATION: 99 % | DIASTOLIC BLOOD PRESSURE: 76 MMHG | TEMPERATURE: 98 F | SYSTOLIC BLOOD PRESSURE: 140 MMHG | HEART RATE: 69 BPM | BODY MASS INDEX: 27.77 KG/M2

## 2024-05-09 DIAGNOSIS — J84.9 INTERSTITIAL LUNG DISEASE (HCC): ICD-10-CM

## 2024-05-09 DIAGNOSIS — I10 ESSENTIAL HYPERTENSION, BENIGN: ICD-10-CM

## 2024-05-09 DIAGNOSIS — L84 CALLUS OF FOOT: Primary | ICD-10-CM

## 2024-05-09 DIAGNOSIS — K21.9 GASTRO-ESOPHAGEAL REFLUX DISEASE WITHOUT ESOPHAGITIS: ICD-10-CM

## 2024-05-09 LAB
ALBUMIN SERPL-MCNC: 3.9 G/DL (ref 3.5–5)
ALBUMIN/GLOB SERPL: 0.8 (ref 1.1–2.2)
ALP SERPL-CCNC: 132 U/L (ref 45–117)
ALT SERPL-CCNC: 22 U/L (ref 12–78)
ANION GAP SERPL CALC-SCNC: 5 MMOL/L (ref 5–15)
AST SERPL-CCNC: 22 U/L (ref 15–37)
BILIRUB SERPL-MCNC: 0.6 MG/DL (ref 0.2–1)
BUN SERPL-MCNC: 10 MG/DL (ref 6–20)
BUN/CREAT SERPL: 13 (ref 12–20)
CALCIUM SERPL-MCNC: 9.2 MG/DL (ref 8.5–10.1)
CHLORIDE SERPL-SCNC: 106 MMOL/L (ref 97–108)
CHOLEST SERPL-MCNC: 184 MG/DL
CO2 SERPL-SCNC: 30 MMOL/L (ref 21–32)
CREAT SERPL-MCNC: 0.8 MG/DL (ref 0.55–1.02)
ERYTHROCYTE [DISTWIDTH] IN BLOOD BY AUTOMATED COUNT: 14.3 % (ref 11.5–14.5)
GLOBULIN SER CALC-MCNC: 4.6 G/DL (ref 2–4)
GLUCOSE SERPL-MCNC: 99 MG/DL (ref 65–100)
HCT VFR BLD AUTO: 36.4 % (ref 35–47)
HDLC SERPL-MCNC: 53 MG/DL
HDLC SERPL: 3.5 (ref 0–5)
HGB BLD-MCNC: 11.8 G/DL (ref 11.5–16)
LDLC SERPL CALC-MCNC: 115.8 MG/DL (ref 0–100)
MCH RBC QN AUTO: 28.7 PG (ref 26–34)
MCHC RBC AUTO-ENTMCNC: 32.4 G/DL (ref 30–36.5)
MCV RBC AUTO: 88.6 FL (ref 80–99)
NRBC # BLD: 0 K/UL (ref 0–0.01)
NRBC BLD-RTO: 0 PER 100 WBC
PLATELET # BLD AUTO: 163 K/UL (ref 150–400)
PMV BLD AUTO: 10.8 FL (ref 8.9–12.9)
POTASSIUM SERPL-SCNC: 3.5 MMOL/L (ref 3.5–5.1)
PROT SERPL-MCNC: 8.5 G/DL (ref 6.4–8.2)
RBC # BLD AUTO: 4.11 M/UL (ref 3.8–5.2)
SODIUM SERPL-SCNC: 141 MMOL/L (ref 136–145)
TRIGL SERPL-MCNC: 76 MG/DL
VLDLC SERPL CALC-MCNC: 15.2 MG/DL
WBC # BLD AUTO: 2.5 K/UL (ref 3.6–11)

## 2024-05-09 PROCEDURE — 3077F SYST BP >= 140 MM HG: CPT | Performed by: INTERNAL MEDICINE

## 2024-05-09 PROCEDURE — G8419 CALC BMI OUT NRM PARAM NOF/U: HCPCS | Performed by: INTERNAL MEDICINE

## 2024-05-09 PROCEDURE — 3078F DIAST BP <80 MM HG: CPT | Performed by: INTERNAL MEDICINE

## 2024-05-09 PROCEDURE — 3017F COLORECTAL CA SCREEN DOC REV: CPT | Performed by: INTERNAL MEDICINE

## 2024-05-09 PROCEDURE — 1036F TOBACCO NON-USER: CPT | Performed by: INTERNAL MEDICINE

## 2024-05-09 PROCEDURE — 99214 OFFICE O/P EST MOD 30 MIN: CPT | Performed by: INTERNAL MEDICINE

## 2024-05-09 PROCEDURE — G8427 DOCREV CUR MEDS BY ELIG CLIN: HCPCS | Performed by: INTERNAL MEDICINE

## 2024-05-09 RX ORDER — AMLODIPINE BESYLATE 10 MG/1
10 TABLET ORAL DAILY
Qty: 90 TABLET | Refills: 3 | Status: SHIPPED | OUTPATIENT
Start: 2024-05-09

## 2024-05-09 SDOH — ECONOMIC STABILITY: FOOD INSECURITY: WITHIN THE PAST 12 MONTHS, YOU WORRIED THAT YOUR FOOD WOULD RUN OUT BEFORE YOU GOT MONEY TO BUY MORE.: NEVER TRUE

## 2024-05-09 SDOH — ECONOMIC STABILITY: INCOME INSECURITY: HOW HARD IS IT FOR YOU TO PAY FOR THE VERY BASICS LIKE FOOD, HOUSING, MEDICAL CARE, AND HEATING?: NOT HARD AT ALL

## 2024-05-09 SDOH — ECONOMIC STABILITY: FOOD INSECURITY: WITHIN THE PAST 12 MONTHS, THE FOOD YOU BOUGHT JUST DIDN'T LAST AND YOU DIDN'T HAVE MONEY TO GET MORE.: NEVER TRUE

## 2024-05-09 ASSESSMENT — PATIENT HEALTH QUESTIONNAIRE - PHQ9
1. LITTLE INTEREST OR PLEASURE IN DOING THINGS: NOT AT ALL
SUM OF ALL RESPONSES TO PHQ QUESTIONS 1-9: 0
1. LITTLE INTEREST OR PLEASURE IN DOING THINGS: NOT AT ALL
SUM OF ALL RESPONSES TO PHQ9 QUESTIONS 1 & 2: 0
SUM OF ALL RESPONSES TO PHQ QUESTIONS 1-9: 0
SUM OF ALL RESPONSES TO PHQ9 QUESTIONS 1 & 2: 0
SUM OF ALL RESPONSES TO PHQ QUESTIONS 1-9: 0
2. FEELING DOWN, DEPRESSED OR HOPELESS: NOT AT ALL

## 2024-05-09 ASSESSMENT — LIFESTYLE VARIABLES
HOW OFTEN DO YOU HAVE A DRINK CONTAINING ALCOHOL: MONTHLY OR LESS
HOW MANY STANDARD DRINKS CONTAINING ALCOHOL DO YOU HAVE ON A TYPICAL DAY: 1 OR 2

## 2024-05-09 NOTE — PROGRESS NOTES
1. Have you been to the ER, urgent care clinic since your last visit?  Hospitalized since your last visit?No    2. Have you seen or consulted any other health care providers outside of the [facility] since your last visit?  Include any pap smears or colon screening. No     Chief Complaint   Patient presents with    Other     Bronchitis f/u    Foot Pain    Medicare AWV           PHQ-9 Total Score: 0 (5/9/2024  1:24 PM)    
distress  Skin: warm and dry, no rash or erythema  Head: normocephalic and atraumatic  Eyes: pupils equal, round, and reactive to light, extraocular eye movements intact, conjunctivae normal  ENT: tympanic membrane, external ear and ear canal normal bilaterally, nose without deformity, nasal mucosa and turbinates normal without polyps  Neck: supple and non-tender without mass, no thyromegaly or thyroid nodules, no cervical lymphadenopathy  Pulmonary/Chest: clear to auscultation bilaterally- no wheezes, rales or rhonchi, normal air movement, no respiratory distress  Cardiovascular: normal rate, regular rhythm, normal S1 and S2, no murmurs, rubs, clicks, or gallops, distal pulses intact, no carotid bruits  Abdomen: soft, non-tender, non-distended, normal bowel sounds, no masses or organomegaly  Extremities:lt.great toe callous noted  Musculoskeletal: normal range of motion, no joint swelling, deformity or tenderness  Neurologic: reflexes normal and symmetric, no cranial nerve deficit, gait, coordination and speech normal       No Known Allergies  Prior to Visit Medications    Medication Sig Taking? Authorizing Provider   amLODIPine (NORVASC) 10 MG tablet Take 1 tablet by mouth daily Yes Alvin Rooney Jr., MD   naproxen (NAPROSYN) 500 MG tablet Take 1 tablet by mouth 2 times daily (with meals) Yes Alvin Rooney Jr., MD   losartan (COZAAR) 100 MG tablet Take 1 tablet by mouth daily Yes Alvin Rooney Jr., MD   diclofenac sodium (VOLTAREN) 1 % GEL Apply 2 g topically 4 times daily Yes Alvin Rooney Jr., MD   potassium chloride (KLOR-CON M) 10 MEQ extended release tablet Take 1 tablet by mouth daily Yes Alvin Rooney Jr., MD   bumetanide (BUMEX) 1 MG tablet Take 1 tablet by mouth once daily Yes Alvin Rooney Jr., MD   vitamin D 25 MCG (1000 UT) CAPS Take by mouth daily Yes Automatic Reconciliation, Ar   clobetasol (TEMOVATE) 0.05 % ointment Apply topically 2 times daily Yes Automatic Reconciliation, Ar

## 2024-08-08 ENCOUNTER — OFFICE VISIT (OUTPATIENT)
Facility: CLINIC | Age: 64
End: 2024-08-08

## 2024-08-08 VITALS
HEIGHT: 70 IN | RESPIRATION RATE: 16 BRPM | HEART RATE: 77 BPM | SYSTOLIC BLOOD PRESSURE: 126 MMHG | OXYGEN SATURATION: 99 % | TEMPERATURE: 98 F | DIASTOLIC BLOOD PRESSURE: 74 MMHG | WEIGHT: 191 LBS | BODY MASS INDEX: 27.35 KG/M2

## 2024-08-08 DIAGNOSIS — Z00.00 MEDICARE ANNUAL WELLNESS VISIT, SUBSEQUENT: ICD-10-CM

## 2024-08-08 DIAGNOSIS — L93.0 DISCOID LUPUS ERYTHEMATOSUS: ICD-10-CM

## 2024-08-08 DIAGNOSIS — M17.12 PRIMARY OSTEOARTHRITIS OF LEFT KNEE: ICD-10-CM

## 2024-08-08 DIAGNOSIS — J30.1 SEASONAL ALLERGIC RHINITIS DUE TO POLLEN: ICD-10-CM

## 2024-08-08 DIAGNOSIS — I10 ESSENTIAL HYPERTENSION, BENIGN: Primary | ICD-10-CM

## 2024-08-08 DIAGNOSIS — K21.9 GASTRO-ESOPHAGEAL REFLUX DISEASE WITHOUT ESOPHAGITIS: ICD-10-CM

## 2024-08-08 DIAGNOSIS — J84.9 INTERSTITIAL LUNG DISEASE (HCC): ICD-10-CM

## 2024-08-08 DIAGNOSIS — M17.0 PRIMARY OSTEOARTHRITIS OF BOTH KNEES: ICD-10-CM

## 2024-08-08 RX ORDER — NAPROXEN 500 MG/1
500 TABLET ORAL 2 TIMES DAILY WITH MEALS
Qty: 60 TABLET | Refills: 0 | Status: SHIPPED | OUTPATIENT
Start: 2024-08-08

## 2024-08-08 RX ORDER — MYCOPHENOLATE MOFETIL 500 MG/1
500 TABLET ORAL 2 TIMES DAILY
Qty: 60 TABLET | Refills: 3 | Status: SHIPPED | OUTPATIENT
Start: 2024-08-08

## 2024-08-08 ASSESSMENT — PATIENT HEALTH QUESTIONNAIRE - PHQ9
SUM OF ALL RESPONSES TO PHQ QUESTIONS 1-9: 0
SUM OF ALL RESPONSES TO PHQ QUESTIONS 1-9: 0
SUM OF ALL RESPONSES TO PHQ9 QUESTIONS 1 & 2: 0
2. FEELING DOWN, DEPRESSED OR HOPELESS: NOT AT ALL
SUM OF ALL RESPONSES TO PHQ QUESTIONS 1-9: 0
SUM OF ALL RESPONSES TO PHQ QUESTIONS 1-9: 0
1. LITTLE INTEREST OR PLEASURE IN DOING THINGS: NOT AT ALL

## 2024-08-08 NOTE — PROGRESS NOTES
1. Have you been to the ER, urgent care clinic since your last visit?  Hospitalized since your last visit?No    2. Have you seen or consulted any other health care providers outside of the [facility] since your last visit?  Include any pap smears or colon screening. No     Chief Complaint   Patient presents with    Gastroesophageal Reflux    Hypertension    Medicare AWV         PHQ-9 Total Score: 0 (8/8/2024  1:21 PM)

## 2024-08-08 NOTE — PROGRESS NOTES
Medicare Annual Wellness Visit    Dana Lopez is here for Gastroesophageal Reflux, Hypertension, and Medicare AWV    Assessment & Plan   Essential hypertension, benign  Gastro-esophageal reflux disease without esophagitis  Interstitial lung disease (HCC)  Discoid lupus erythematosus  Primary osteoarthritis of left knee  Seasonal allergic rhinitis due to pollen  Primary osteoarthritis of both knees    Recommendations for Preventive Services Due: see orders and patient instructions/AVS.  Recommended screening schedule for the next 5-10 years is provided to the patient in written form: see Patient Instructions/AVS.     Return in about 3 months (around 11/8/2024), or if symptoms worsen or fail to improve.     Subjective   Hypertension Review:  The patient has essential hypertension  Diet and Lifestyle: generally follows a  low sodium diet, exercises sporadically  Home BP Monitoring: is not measured at home.  Pertinent ROS: taking medications as instructed, no medication side effects noted, no TIA's, no chest pain on exertion, no dyspnea on exertion, no swelling of ankles.     Vitamin D Deficiency Review   The patient has a previous history of vitamin d deficiency  The patient is on medication for vitamin d deficiency  The patient has denied any recent falls or fractures    Allergic Rhinitis  Patient presents for evaluation of allergic symptoms.  Symptoms include nasal congestion, rhinorrhea, sneezing, eye itching, watery eyes. Precipitants haved included possible pollen.      GERD Review:   Patient has a history of gastroesophageal reflux with heartburn. Symptoms have been present for a few months.  She denies dysphagia.  She  has not lost weight.  She denies melena, hematochezia, hematemesis, and coffee ground emesis.  This has been associated with fullness after meals.  She denies abdominal bloating and none.  Medical therapy in the past has included proton pump inhibitor      Patient's complete Health Risk

## 2024-08-08 NOTE — PATIENT INSTRUCTIONS
years to screen for glaucoma; cataracts, macular degeneration, and other eye disorders.  A preventive dental visit is recommended every 6 months.  Try to get at least 150 minutes of exercise per week or 10,000 steps per day on a pedometer .  Order or download the FREE \"Exercise & Physical Activity: Your Everyday Guide\" from [employer] [place] [employer]. Call 1-818.363.4848 or search [employer] [place] [employer] online.  You need 0212-1708 mg of calcium and 6810-3083 IU of vitamin D per day. It is possible to meet your calcium requirement with diet alone, but a vitamin D supplement is usually necessary to meet this goal.  When exposed to the sun, use a sunscreen that protects against both UVA and UVB radiation with an SPF of 30 or greater. Reapply every 2 to 3 hours or after sweating, drying off with a towel, or swimming.  Always wear a seat belt when traveling in a car. Always wear a helmet when riding a bicycle or motorcycle.

## 2024-08-28 ENCOUNTER — TELEPHONE (OUTPATIENT)
Dept: PHARMACY | Facility: CLINIC | Age: 64
End: 2024-08-28

## 2024-08-28 NOTE — TELEPHONE ENCOUNTER
[facility] CLINICAL PHARMACY: ADHERENCE REVIEW  Identified care gap per [place] fills with [facility]: ACE/ARB adherence    Medicare Advantage - MRMA  ACO  Per insurer report, LIS-1 - may be able to receive up to a 100-day supply for the same cost as a 30-day supply.    ASSESSMENT    ACE/ARB ADHERENCE    Insurance Records claims through  24  (Prior Year PDC = 74% - FAILED; YTD PDC = FIRST FILL; Potential Fail Date: 24):   LOSARTAN POT  MG last filled on 24 for 100 day supply. Next refill due: 07.15.24    Prescribed si tablet/capsule daily    Per Reconcile Dispense History and Insurer Portal: last filled on 24 for 100 day supply.     Per [facility]: last picked up on 24 for 100 day supply. Billed through [facility]. 0 refills remaining. Next Visit: 24    BP Readings from Last 3 Encounters:   24 126/74   24 (!) 140/76   24 (!) 160/102     Estimated Creatinine Clearance: 85 mL/min (based on SCr of 0.8 mg/dL).  Lab Results   Component Value Date    CREATININE 0.80 2024     Lab Results   Component Value Date    K 3.5 2024       PLAN  The following are interventions that have been identified:   Patient eligible for 100 day supply    Outreach:  No outreach planned at this time    Last Visit: 24  Next Visit: 24    Janice Busby CPhT  Population Health    [facility]   [phone] option 1     For Pharmacy Admin Tracking Only    Program: [employer]  CPA in place:  No  Gap Closed?: Yes   Time Spent (min): 20

## 2024-09-20 RX ORDER — FAMOTIDINE 40 MG/1
40 TABLET, FILM COATED ORAL DAILY
Qty: 90 TABLET | Refills: 0 | Status: SHIPPED | OUTPATIENT
Start: 2024-09-20

## 2024-11-07 ENCOUNTER — OFFICE VISIT (OUTPATIENT)
Facility: CLINIC | Age: 64
End: 2024-11-07
Payer: MEDICARE

## 2024-11-07 VITALS
OXYGEN SATURATION: 99 % | HEIGHT: 70 IN | SYSTOLIC BLOOD PRESSURE: 138 MMHG | DIASTOLIC BLOOD PRESSURE: 88 MMHG | HEART RATE: 77 BPM | RESPIRATION RATE: 20 BRPM | TEMPERATURE: 99.2 F | WEIGHT: 190 LBS | BODY MASS INDEX: 27.2 KG/M2

## 2024-11-07 DIAGNOSIS — I10 ESSENTIAL HYPERTENSION, BENIGN: Primary | ICD-10-CM

## 2024-11-07 DIAGNOSIS — B37.2 CANDIDAL INTERTRIGO: ICD-10-CM

## 2024-11-07 DIAGNOSIS — Z11.4 SCREENING FOR HIV (HUMAN IMMUNODEFICIENCY VIRUS): ICD-10-CM

## 2024-11-07 DIAGNOSIS — K21.9 GASTRO-ESOPHAGEAL REFLUX DISEASE WITHOUT ESOPHAGITIS: ICD-10-CM

## 2024-11-07 PROCEDURE — G8419 CALC BMI OUT NRM PARAM NOF/U: HCPCS | Performed by: INTERNAL MEDICINE

## 2024-11-07 PROCEDURE — 1036F TOBACCO NON-USER: CPT | Performed by: INTERNAL MEDICINE

## 2024-11-07 PROCEDURE — 3017F COLORECTAL CA SCREEN DOC REV: CPT | Performed by: INTERNAL MEDICINE

## 2024-11-07 PROCEDURE — G8427 DOCREV CUR MEDS BY ELIG CLIN: HCPCS | Performed by: INTERNAL MEDICINE

## 2024-11-07 PROCEDURE — 3075F SYST BP GE 130 - 139MM HG: CPT | Performed by: INTERNAL MEDICINE

## 2024-11-07 PROCEDURE — 3079F DIAST BP 80-89 MM HG: CPT | Performed by: INTERNAL MEDICINE

## 2024-11-07 PROCEDURE — G8484 FLU IMMUNIZE NO ADMIN: HCPCS | Performed by: INTERNAL MEDICINE

## 2024-11-07 PROCEDURE — 99214 OFFICE O/P EST MOD 30 MIN: CPT | Performed by: INTERNAL MEDICINE

## 2024-11-07 RX ORDER — BUMETANIDE 1 MG/1
1 TABLET ORAL DAILY
Qty: 90 TABLET | Refills: 0 | Status: SHIPPED | OUTPATIENT
Start: 2024-11-07

## 2024-11-07 RX ORDER — AMLODIPINE BESYLATE 10 MG/1
10 TABLET ORAL DAILY
Qty: 90 TABLET | Refills: 3 | Status: SHIPPED | OUTPATIENT
Start: 2024-11-07

## 2024-11-07 RX ORDER — MYCOPHENOLATE MOFETIL 500 MG/1
500 TABLET ORAL 2 TIMES DAILY
Qty: 60 TABLET | Refills: 3 | Status: SHIPPED | OUTPATIENT
Start: 2024-11-07

## 2024-11-07 RX ORDER — CLOTRIMAZOLE 1 %
CREAM (GRAM) TOPICAL
Qty: 45 G | Refills: 3 | Status: SHIPPED | OUTPATIENT
Start: 2024-11-07 | End: 2024-11-14

## 2024-11-07 RX ORDER — CLOBETASOL PROPIONATE 0.5 MG/G
OINTMENT TOPICAL 2 TIMES DAILY
Status: CANCELLED | OUTPATIENT
Start: 2024-11-07

## 2024-11-07 SDOH — ECONOMIC STABILITY: FOOD INSECURITY: WITHIN THE PAST 12 MONTHS, THE FOOD YOU BOUGHT JUST DIDN'T LAST AND YOU DIDN'T HAVE MONEY TO GET MORE.: NEVER TRUE

## 2024-11-07 SDOH — ECONOMIC STABILITY: INCOME INSECURITY: HOW HARD IS IT FOR YOU TO PAY FOR THE VERY BASICS LIKE FOOD, HOUSING, MEDICAL CARE, AND HEATING?: SOMEWHAT HARD

## 2024-11-07 SDOH — ECONOMIC STABILITY: FOOD INSECURITY: WITHIN THE PAST 12 MONTHS, YOU WORRIED THAT YOUR FOOD WOULD RUN OUT BEFORE YOU GOT MONEY TO BUY MORE.: NEVER TRUE

## 2024-11-07 ASSESSMENT — PATIENT HEALTH QUESTIONNAIRE - PHQ9
SUM OF ALL RESPONSES TO PHQ QUESTIONS 1-9: 0
SUM OF ALL RESPONSES TO PHQ QUESTIONS 1-9: 0
2. FEELING DOWN, DEPRESSED OR HOPELESS: NOT AT ALL
SUM OF ALL RESPONSES TO PHQ QUESTIONS 1-9: 0
1. LITTLE INTEREST OR PLEASURE IN DOING THINGS: NOT AT ALL
SUM OF ALL RESPONSES TO PHQ9 QUESTIONS 1 & 2: 0
SUM OF ALL RESPONSES TO PHQ QUESTIONS 1-9: 0

## 2024-11-07 ASSESSMENT — ANXIETY QUESTIONNAIRES
7. FEELING AFRAID AS IF SOMETHING AWFUL MIGHT HAPPEN: NOT AT ALL
3. WORRYING TOO MUCH ABOUT DIFFERENT THINGS: NOT AT ALL
5. BEING SO RESTLESS THAT IT IS HARD TO SIT STILL: NOT AT ALL
6. BECOMING EASILY ANNOYED OR IRRITABLE: NOT AT ALL
1. FEELING NERVOUS, ANXIOUS, OR ON EDGE: NOT AT ALL
2. NOT BEING ABLE TO STOP OR CONTROL WORRYING: NOT AT ALL
4. TROUBLE RELAXING: NOT AT ALL
GAD7 TOTAL SCORE: 0
IF YOU CHECKED OFF ANY PROBLEMS ON THIS QUESTIONNAIRE, HOW DIFFICULT HAVE THESE PROBLEMS MADE IT FOR YOU TO DO YOUR WORK, TAKE CARE OF THINGS AT HOME, OR GET ALONG WITH OTHER PEOPLE: NOT DIFFICULT AT ALL

## 2024-11-07 NOTE — PROGRESS NOTES
Mother      Current Outpatient Medications   Medication Sig Dispense Refill    mycophenolate (CELLCEPT) 500 MG tablet Take 1 tablet by mouth 2 times daily ceived the following from [facility]: Outside name: mycophenolate (CELLCEPT) 500 mg tablet 60 tablet 3    bumetanide (BUMEX) 1 MG tablet Take 1 tablet by mouth daily 90 tablet 0    amLODIPine (NORVASC) 10 MG tablet Take 1 tablet by mouth daily 90 tablet 3    clotrimazole (LOTRIMIN AF) 1 % cream Apply topically 2 times daily. 45 g 3    famotidine (PEPCID) 40 MG tablet Take 1 tablet by mouth once daily 90 tablet 0    naproxen (NAPROSYN) 500 MG tablet Take 1 tablet by mouth 2 times daily (with meals) 60 tablet 0    losartan (COZAAR) 100 MG tablet Take 1 tablet by mouth daily 100 tablet 3    diclofenac sodium (VOLTAREN) 1 % GEL Apply 2 g topically 4 times daily 100 g 12    potassium chloride (KLOR-CON M) 10 MEQ extended release tablet Take 1 tablet by mouth daily 60 tablet 12    vitamin D 25 MCG (1000 UT) CAPS Take by mouth daily      clobetasol (TEMOVATE) 0.05 % ointment Apply topically 2 times daily      cloNIDine (CATAPRES) 0.2 MG tablet TAKE 1 TABLET BY MOUTH AT BEDTIME      fluticasone (FLONASE) 50 MCG/ACT nasal spray 2 sprays by Nasal route daily      ipratropium (ATROVENT) 0.06 % nasal spray 2 sprays by Nasal route 4 times daily      latanoprost (XALATAN) 0.005 % ophthalmic solution Apply 1 drop to eye       No current facility-administered medications for this visit.     No Known Allergies    Objective:  /88 (Site: Right Upper Arm, Position: Sitting, Cuff Size: Large Adult)   Pulse 77   Temp 99.2 °F (37.3 °C) (Oral)   Resp 20   Ht 1.778 m (5' 10\")   Wt 86.2 kg (190 lb)   SpO2 99%   BMI 27.26 kg/m²   Physical Exam:   General appearance - alert, well appearing, and in no distress  Mental status - alert, oriented to person, place, and time  EYE-ALLIE, EOMI, corneas normal, no foreign bodies  ENT-ENT exam normal, no neck nodes or

## 2024-11-25 RX ORDER — LOSARTAN POTASSIUM 100 MG/1
100 TABLET ORAL DAILY
Qty: 100 TABLET | Refills: 0 | Status: SHIPPED | OUTPATIENT
Start: 2024-11-25

## 2024-11-25 NOTE — TELEPHONE ENCOUNTER
Last appointment: 11/07/2024 MD Rooney   Next appointment: 02/05/2025 MD Rooney   Previous refill encounter(s):   09/29/2023 Cozaar #100 with 3 refills.     For Pharmacy Admin Tracking Only    Program: Medication Refill  Intervention Detail: New Rx: 1, reason: Patient Preference  Time Spent (min): 5    Requested Prescriptions     Pending Prescriptions Disp Refills    losartan (COZAAR) 100 MG tablet [Pharmacy Med Name: Losartan Potassium 100 MG Oral Tablet] 100 tablet 0     Sig: Take 1 tablet by mouth once daily

## 2025-02-02 RX ORDER — FAMOTIDINE 40 MG/1
40 TABLET, FILM COATED ORAL DAILY
Qty: 90 TABLET | Refills: 3 | Status: SHIPPED | OUTPATIENT
Start: 2025-02-02

## 2025-02-02 RX ORDER — NAPROXEN 500 MG/1
500 TABLET ORAL 2 TIMES DAILY WITH MEALS
Qty: 60 TABLET | Refills: 3 | Status: SHIPPED | OUTPATIENT
Start: 2025-02-02

## 2025-02-05 ENCOUNTER — OFFICE VISIT (OUTPATIENT)
Facility: CLINIC | Age: 65
End: 2025-02-05
Payer: MEDICARE

## 2025-02-05 VITALS
TEMPERATURE: 98 F | BODY MASS INDEX: 26.92 KG/M2 | HEART RATE: 80 BPM | DIASTOLIC BLOOD PRESSURE: 84 MMHG | WEIGHT: 188 LBS | SYSTOLIC BLOOD PRESSURE: 150 MMHG | RESPIRATION RATE: 16 BRPM | HEIGHT: 70 IN

## 2025-02-05 DIAGNOSIS — K21.9 GASTRO-ESOPHAGEAL REFLUX DISEASE WITHOUT ESOPHAGITIS: ICD-10-CM

## 2025-02-05 DIAGNOSIS — I10 ESSENTIAL HYPERTENSION, BENIGN: ICD-10-CM

## 2025-02-05 DIAGNOSIS — J40 BRONCHITIS: Primary | ICD-10-CM

## 2025-02-05 DIAGNOSIS — R11.0 NAUSEA: ICD-10-CM

## 2025-02-05 PROCEDURE — 99214 OFFICE O/P EST MOD 30 MIN: CPT | Performed by: INTERNAL MEDICINE

## 2025-02-05 PROCEDURE — 3077F SYST BP >= 140 MM HG: CPT | Performed by: INTERNAL MEDICINE

## 2025-02-05 PROCEDURE — 3079F DIAST BP 80-89 MM HG: CPT | Performed by: INTERNAL MEDICINE

## 2025-02-05 PROCEDURE — G8419 CALC BMI OUT NRM PARAM NOF/U: HCPCS | Performed by: INTERNAL MEDICINE

## 2025-02-05 PROCEDURE — G8427 DOCREV CUR MEDS BY ELIG CLIN: HCPCS | Performed by: INTERNAL MEDICINE

## 2025-02-05 PROCEDURE — 3017F COLORECTAL CA SCREEN DOC REV: CPT | Performed by: INTERNAL MEDICINE

## 2025-02-05 PROCEDURE — 1036F TOBACCO NON-USER: CPT | Performed by: INTERNAL MEDICINE

## 2025-02-05 RX ORDER — ONDANSETRON 4 MG/1
4 TABLET, ORALLY DISINTEGRATING ORAL 3 TIMES DAILY PRN
Qty: 21 TABLET | Refills: 0 | Status: SHIPPED | OUTPATIENT
Start: 2025-02-05

## 2025-02-05 RX ORDER — PREDNISONE 5 MG/1
TABLET ORAL
Qty: 21 EACH | Refills: 1 | Status: SHIPPED | OUTPATIENT
Start: 2025-02-05

## 2025-02-05 RX ORDER — AZITHROMYCIN 250 MG/1
TABLET, FILM COATED ORAL
Qty: 6 TABLET | Refills: 0 | Status: SHIPPED | OUTPATIENT
Start: 2025-02-05 | End: 2025-02-15

## 2025-02-05 ASSESSMENT — PATIENT HEALTH QUESTIONNAIRE - PHQ9
2. FEELING DOWN, DEPRESSED OR HOPELESS: NOT AT ALL
SUM OF ALL RESPONSES TO PHQ QUESTIONS 1-9: 0
SUM OF ALL RESPONSES TO PHQ9 QUESTIONS 1 & 2: 0
SUM OF ALL RESPONSES TO PHQ QUESTIONS 1-9: 0
1. LITTLE INTEREST OR PLEASURE IN DOING THINGS: NOT AT ALL

## 2025-02-05 NOTE — PROGRESS NOTES
1. Have you been to the ER, urgent care clinic since your last visit?  Hospitalized since your last visit?No    2. Have you seen or consulted any other health care providers outside of the [facility] since your last visit?  Include any pap smears or colon screening. No     Chief Complaint   Patient presents with    Gastroesophageal Reflux    Hypertension         PHQ-9 Total Score: 0 (2/5/2025  1:09 PM)

## 2025-02-05 NOTE — PROGRESS NOTES
Dana Lopez is a 64 y.o. female and presents with Gastroesophageal Reflux and Hypertension  .  Subjective:  Upper respiratory infection Review:  Dana Lopez is a 64 y.o. female who complains of nasal congestion,sore throat, productive cough, myalgias  for the past few days, gradually worsening since that time.  She denies a history of shortness of breath.  Evaluation to date: none.   Treatment to date: OTC products.  Relevant PMH: No pertinent additional PMH.    GERD Review:   Patient has a history of gastroesophageal reflux with heartburn. Symptoms have been present for a few months.  She denies dysphagia.  She  has not lost weight.  She denies melena, hematochezia, hematemesis, and coffee ground emesis.  This has been associated with fullness after meals.  She denies abdominal bloating and none.  Medical therapy in the past has included proton pump inhibitor    Hypertension Review:  The patient has essential hypertension  Diet and Lifestyle: generally follows a  low sodium diet, exercises sporadically  Home BP Monitoring: is not measured at home.  Pertinent ROS: taking medications as instructed, no medication side effects noted, no TIA's, no chest pain on exertion, no dyspnea on exertion, no swelling of ankles.     States she developed diarrhea after eating chicken      Review of Systems  Constitutional: negative for fevers, chills, anorexia and weight loss  Eyes:   negative for visual disturbance and irritation  ENT:   negative for tinnitus,sore throat,nasal congestion,ear pains.hoarseness  Respiratory:  cough,  dyspnea,wheezing  CV:   negative for chest pain, palpitations, lower extremity edema  GI:   negative for nausea, vomiting, diarrhea, abdominal pain,melena  Endo:               negative for polyuria,polydipsia,polyphagia,heat intolerance  Genitourinary: negative for frequency, dysuria and hematuria  Integument:  negative for rash and pruritus  Hematologic:  negative for easy bruising and

## 2025-02-06 LAB
ALBUMIN SERPL-MCNC: 3.5 G/DL (ref 3.5–5)
ALBUMIN/GLOB SERPL: 0.7 (ref 1.1–2.2)
ALP SERPL-CCNC: 123 U/L (ref 45–117)
ALT SERPL-CCNC: 18 U/L (ref 12–78)
ANION GAP SERPL CALC-SCNC: 6 MMOL/L (ref 2–12)
AST SERPL-CCNC: 12 U/L (ref 15–37)
BILIRUB SERPL-MCNC: 0.7 MG/DL (ref 0.2–1)
BUN SERPL-MCNC: 8 MG/DL (ref 6–20)
BUN/CREAT SERPL: 8 (ref 12–20)
CALCIUM SERPL-MCNC: 9.3 MG/DL (ref 8.5–10.1)
CHLORIDE SERPL-SCNC: 105 MMOL/L (ref 97–108)
CHOLEST SERPL-MCNC: 185 MG/DL
CO2 SERPL-SCNC: 27 MMOL/L (ref 21–32)
CREAT SERPL-MCNC: 0.95 MG/DL (ref 0.55–1.02)
ERYTHROCYTE [DISTWIDTH] IN BLOOD BY AUTOMATED COUNT: 14.3 % (ref 11.5–14.5)
GLOBULIN SER CALC-MCNC: 4.8 G/DL (ref 2–4)
GLUCOSE SERPL-MCNC: 117 MG/DL (ref 65–100)
HCT VFR BLD AUTO: 36.6 % (ref 35–47)
HDLC SERPL-MCNC: 41 MG/DL
HDLC SERPL: 4.5 (ref 0–5)
HGB BLD-MCNC: 11.4 G/DL (ref 11.5–16)
LDLC SERPL CALC-MCNC: 117.8 MG/DL (ref 0–100)
MCH RBC QN AUTO: 28.3 PG (ref 26–34)
MCHC RBC AUTO-ENTMCNC: 31.1 G/DL (ref 30–36.5)
MCV RBC AUTO: 90.8 FL (ref 80–99)
NRBC # BLD: 0 K/UL (ref 0–0.01)
NRBC BLD-RTO: 0 PER 100 WBC
PLATELET # BLD AUTO: 205 K/UL (ref 150–400)
PMV BLD AUTO: 11.4 FL (ref 8.9–12.9)
POTASSIUM SERPL-SCNC: 4.2 MMOL/L (ref 3.5–5.1)
PROT SERPL-MCNC: 8.3 G/DL (ref 6.4–8.2)
RBC # BLD AUTO: 4.03 M/UL (ref 3.8–5.2)
SODIUM SERPL-SCNC: 138 MMOL/L (ref 136–145)
TRIGL SERPL-MCNC: 131 MG/DL
VLDLC SERPL CALC-MCNC: 26.2 MG/DL
WBC # BLD AUTO: 3.1 K/UL (ref 3.6–11)

## 2025-04-20 ENCOUNTER — APPOINTMENT (OUTPATIENT)
Facility: HOSPITAL | Age: 65
End: 2025-04-20
Payer: MEDICARE

## 2025-04-20 ENCOUNTER — HOSPITAL ENCOUNTER (EMERGENCY)
Facility: HOSPITAL | Age: 65
Discharge: HOME | End: 2025-04-20
Attending: STUDENT IN AN ORGANIZED HEALTH CARE EDUCATION/TRAINING PROGRAM
Payer: MEDICARE

## 2025-04-20 VITALS
RESPIRATION RATE: 16 BRPM | TEMPERATURE: 98.1 F | OXYGEN SATURATION: 99 % | HEART RATE: 66 BPM | DIASTOLIC BLOOD PRESSURE: 91 MMHG | WEIGHT: 193 LBS | SYSTOLIC BLOOD PRESSURE: 194 MMHG | BODY MASS INDEX: 27.69 KG/M2

## 2025-04-20 DIAGNOSIS — M79.661 PAIN IN BOTH LOWER LEGS: Primary | ICD-10-CM

## 2025-04-20 DIAGNOSIS — R60.0 BILATERAL LOWER EXTREMITY EDEMA: ICD-10-CM

## 2025-04-20 DIAGNOSIS — M79.662 PAIN IN BOTH LOWER LEGS: Primary | ICD-10-CM

## 2025-04-20 DIAGNOSIS — E87.6 HYPOKALEMIA: ICD-10-CM

## 2025-04-20 LAB
ANION GAP SERPL CALC-SCNC: 3 MMOL/L (ref 2–12)
BASOPHILS # BLD: 0 K/UL (ref 0–0.1)
BASOPHILS NFR BLD: 0 % (ref 0–1)
BUN SERPL-MCNC: 9 MG/DL (ref 6–20)
BUN/CREAT SERPL: 10 (ref 12–20)
CALCIUM SERPL-MCNC: 9 MG/DL (ref 8.5–10.1)
CHLORIDE SERPL-SCNC: 109 MMOL/L (ref 97–108)
CO2 SERPL-SCNC: 29 MMOL/L (ref 21–32)
CREAT SERPL-MCNC: 0.89 MG/DL (ref 0.55–1.02)
DIFFERENTIAL METHOD BLD: ABNORMAL
EOSINOPHIL # BLD: 0.16 K/UL (ref 0–0.4)
EOSINOPHIL NFR BLD: 6 % (ref 0–7)
ERYTHROCYTE [DISTWIDTH] IN BLOOD BY AUTOMATED COUNT: 14.6 % (ref 11.5–14.5)
GLUCOSE SERPL-MCNC: 112 MG/DL (ref 65–100)
HCT VFR BLD AUTO: 34.2 % (ref 35–47)
HGB BLD-MCNC: 10.9 G/DL (ref 11.5–16)
IMM GRANULOCYTES # BLD AUTO: 0 K/UL (ref 0–0.04)
IMM GRANULOCYTES NFR BLD AUTO: 0 % (ref 0–0.5)
LYMPHOCYTES # BLD: 1.25 K/UL (ref 0.8–3.5)
LYMPHOCYTES NFR BLD: 48 % (ref 12–49)
MAGNESIUM SERPL-MCNC: 2.1 MG/DL (ref 1.6–2.4)
MCH RBC QN AUTO: 28.5 PG (ref 26–34)
MCHC RBC AUTO-ENTMCNC: 31.9 G/DL (ref 30–36.5)
MCV RBC AUTO: 89.5 FL (ref 80–99)
MONOCYTES # BLD: 0.1 K/UL (ref 0–1)
MONOCYTES NFR BLD: 4 % (ref 5–13)
NEUTS SEG # BLD: 1.09 K/UL (ref 1.8–8)
NEUTS SEG NFR BLD: 42 % (ref 32–75)
NRBC # BLD: 0 K/UL (ref 0–0.01)
NRBC BLD-RTO: 0 PER 100 WBC
PHOSPHATE SERPL-MCNC: 2.7 MG/DL (ref 2.6–4.7)
PLATELET # BLD AUTO: 141 K/UL (ref 150–400)
PMV BLD AUTO: 10 FL (ref 8.9–12.9)
POTASSIUM SERPL-SCNC: 3.1 MMOL/L (ref 3.5–5.1)
RBC # BLD AUTO: 3.82 M/UL (ref 3.8–5.2)
RBC MORPH BLD: ABNORMAL
SODIUM SERPL-SCNC: 141 MMOL/L (ref 136–145)
TOTAL CELLS COUNTED SPEC: 50
WBC # BLD AUTO: 2.6 K/UL (ref 3.6–11)

## 2025-04-20 PROCEDURE — 85025 COMPLETE CBC W/AUTO DIFF WBC: CPT

## 2025-04-20 PROCEDURE — 36415 COLL VENOUS BLD VENIPUNCTURE: CPT

## 2025-04-20 PROCEDURE — 99284 EMERGENCY DEPT VISIT MOD MDM: CPT

## 2025-04-20 PROCEDURE — 80048 BASIC METABOLIC PNL TOTAL CA: CPT

## 2025-04-20 PROCEDURE — 84100 ASSAY OF PHOSPHORUS: CPT

## 2025-04-20 PROCEDURE — 83735 ASSAY OF MAGNESIUM: CPT

## 2025-04-20 PROCEDURE — 93970 EXTREMITY STUDY: CPT

## 2025-04-20 PROCEDURE — 6370000000 HC RX 637 (ALT 250 FOR IP)

## 2025-04-20 RX ORDER — MAGNESIUM OXIDE 400 MG/1
400 TABLET ORAL DAILY
Qty: 90 TABLET | Refills: 1 | Status: SHIPPED | OUTPATIENT
Start: 2025-04-20

## 2025-04-20 RX ORDER — POTASSIUM CHLORIDE 1.5 G/1.58G
40 POWDER, FOR SOLUTION ORAL ONCE
Status: COMPLETED | OUTPATIENT
Start: 2025-04-20 | End: 2025-04-20

## 2025-04-20 RX ADMIN — POTASSIUM CHLORIDE 40 MEQ: 1.5 FOR SOLUTION ORAL at 16:36

## 2025-04-20 ASSESSMENT — PAIN DESCRIPTION - DESCRIPTORS: DESCRIPTORS: TIGHTNESS

## 2025-04-20 ASSESSMENT — PAIN SCALES - GENERAL
PAINLEVEL_OUTOF10: 8
PAINLEVEL_OUTOF10: 7

## 2025-04-20 ASSESSMENT — PAIN DESCRIPTION - LOCATION: LOCATION: LEG

## 2025-04-20 ASSESSMENT — PAIN DESCRIPTION - ORIENTATION: ORIENTATION: LEFT;LOWER

## 2025-04-20 NOTE — DISCHARGE INSTRUCTIONS
Please ask your doctor about neuropathic pain and low potassium.  Please discuss with him restarting your gabapentin and potassium supplementation.

## 2025-04-20 NOTE — ED PROVIDER NOTES
[facility] EMERGENCY DEPARTMENT  EMERGENCY DEPARTMENT ENCOUNTER       Pt Name: Dana Lopez  MRN: [id number]  Birthdate 1960  Date of evaluation: 4/20/2025  Provider: Liam Kaiser MD   PCP: Alvin Rooney Jr., MD  Note Started: 3:22 PM EDT 4/20/25  Attending Physician: Dr. Braun    CHIEF COMPLAINT       Chief Complaint   Patient presents with    Generalized Body Aches     Patient ambulatory to triage with complaints of left side body aches, denies any numbness. Patient reports her left foot feels tight and swollen more than normal, patient reports taking fluid pills. Patient denies CP and SOB        HISTORY OF PRESENT ILLNESS: 1 or more elements      History From: Patient, History limited by: none     Dana Lopez is a 65 y.o. female with history to include lupus, hypertension on diuretics, anemia, with prior back surgery complicated by bilateral lower extremity neuropathy presenting for left leg swelling, cramping, and pain.  Endorses compliance with medications to include Bumex.  Denies changes in diet or decreased fluid intake.  Denies significant alcohol intake. denies recent trauma or procedures.  Reporting 1 week of worsening left lower extremity swelling and cramping.  Also endorsing left arm and shoulder soreness also without trauma or reported injury.  Denies fever, dyspnea, chest pain, abdominal pain, vomiting, history of VTE, hormone use, change in chronic neuropathy.       Please See MDM for Additional Details of the HPI/PMH  Nursing Notes were all reviewed and agreed with or any disagreements were addressed in the HPI.     REVIEW OF SYSTEMS        Positives and Pertinent negatives as per HPI.    PAST HISTORY     Past Medical History:  Past Medical History:   Diagnosis Date    Anemia, unspecified 2/21/2011    Degeneration of intervertebral disc, site unspecified 2/21/2011    Discoid lupus 2/21/2011    Enthesopathy of unspecified site 2/21/2011    Esophageal reflux 2/21/2011

## 2025-04-20 NOTE — ED NOTES
Patient ambulatory to bathroom in pod 5 without difficulty; changed into hospital gown on own;gait steady.

## 2025-05-07 ENCOUNTER — OFFICE VISIT (OUTPATIENT)
Facility: CLINIC | Age: 65
End: 2025-05-07
Payer: MEDICARE

## 2025-05-07 VITALS
DIASTOLIC BLOOD PRESSURE: 74 MMHG | RESPIRATION RATE: 16 BRPM | BODY MASS INDEX: 27.49 KG/M2 | WEIGHT: 192 LBS | HEART RATE: 71 BPM | TEMPERATURE: 98 F | OXYGEN SATURATION: 96 % | HEIGHT: 70 IN | SYSTOLIC BLOOD PRESSURE: 120 MMHG

## 2025-05-07 DIAGNOSIS — E87.6 HYPOKALEMIA: ICD-10-CM

## 2025-05-07 DIAGNOSIS — R60.0 LOCALIZED EDEMA: ICD-10-CM

## 2025-05-07 DIAGNOSIS — M17.0 PRIMARY OSTEOARTHRITIS OF BOTH KNEES: ICD-10-CM

## 2025-05-07 DIAGNOSIS — K21.9 GASTRO-ESOPHAGEAL REFLUX DISEASE WITHOUT ESOPHAGITIS: ICD-10-CM

## 2025-05-07 DIAGNOSIS — I10 ESSENTIAL HYPERTENSION, BENIGN: Primary | ICD-10-CM

## 2025-05-07 LAB
ANION GAP SERPL CALC-SCNC: 4 MMOL/L (ref 2–12)
BUN SERPL-MCNC: 7 MG/DL (ref 6–20)
BUN/CREAT SERPL: 10 (ref 12–20)
CALCIUM SERPL-MCNC: 9.3 MG/DL (ref 8.5–10.1)
CHLORIDE SERPL-SCNC: 108 MMOL/L (ref 97–108)
CO2 SERPL-SCNC: 29 MMOL/L (ref 21–32)
CREAT SERPL-MCNC: 0.72 MG/DL (ref 0.55–1.02)
GLUCOSE SERPL-MCNC: 91 MG/DL (ref 65–100)
POTASSIUM SERPL-SCNC: 4.5 MMOL/L (ref 3.5–5.1)
SODIUM SERPL-SCNC: 141 MMOL/L (ref 136–145)

## 2025-05-07 PROCEDURE — 3074F SYST BP LT 130 MM HG: CPT | Performed by: INTERNAL MEDICINE

## 2025-05-07 PROCEDURE — 3078F DIAST BP <80 MM HG: CPT | Performed by: INTERNAL MEDICINE

## 2025-05-07 PROCEDURE — 1123F ACP DISCUSS/DSCN MKR DOCD: CPT | Performed by: INTERNAL MEDICINE

## 2025-05-07 PROCEDURE — 1090F PRES/ABSN URINE INCON ASSESS: CPT | Performed by: INTERNAL MEDICINE

## 2025-05-07 PROCEDURE — G8400 PT W/DXA NO RESULTS DOC: HCPCS | Performed by: INTERNAL MEDICINE

## 2025-05-07 PROCEDURE — 1036F TOBACCO NON-USER: CPT | Performed by: INTERNAL MEDICINE

## 2025-05-07 PROCEDURE — G8419 CALC BMI OUT NRM PARAM NOF/U: HCPCS | Performed by: INTERNAL MEDICINE

## 2025-05-07 PROCEDURE — G8427 DOCREV CUR MEDS BY ELIG CLIN: HCPCS | Performed by: INTERNAL MEDICINE

## 2025-05-07 PROCEDURE — 99214 OFFICE O/P EST MOD 30 MIN: CPT | Performed by: INTERNAL MEDICINE

## 2025-05-07 PROCEDURE — 3017F COLORECTAL CA SCREEN DOC REV: CPT | Performed by: INTERNAL MEDICINE

## 2025-05-07 RX ORDER — AMLODIPINE BESYLATE 10 MG/1
5 TABLET ORAL DAILY
Qty: 90 TABLET | Refills: 3 | Status: SHIPPED
Start: 2025-05-07

## 2025-05-07 RX ORDER — BUMETANIDE 1 MG/1
0.5 TABLET ORAL DAILY
Qty: 90 TABLET | Refills: 0 | Status: SHIPPED | OUTPATIENT
Start: 2025-05-07

## 2025-05-07 SDOH — ECONOMIC STABILITY: FOOD INSECURITY: WITHIN THE PAST 12 MONTHS, YOU WORRIED THAT YOUR FOOD WOULD RUN OUT BEFORE YOU GOT MONEY TO BUY MORE.: NEVER TRUE

## 2025-05-07 SDOH — ECONOMIC STABILITY: FOOD INSECURITY: WITHIN THE PAST 12 MONTHS, THE FOOD YOU BOUGHT JUST DIDN'T LAST AND YOU DIDN'T HAVE MONEY TO GET MORE.: NEVER TRUE

## 2025-05-07 ASSESSMENT — PATIENT HEALTH QUESTIONNAIRE - PHQ9
2. FEELING DOWN, DEPRESSED OR HOPELESS: NOT AT ALL
SUM OF ALL RESPONSES TO PHQ QUESTIONS 1-9: 0
1. LITTLE INTEREST OR PLEASURE IN DOING THINGS: NOT AT ALL

## 2025-05-07 NOTE — PROGRESS NOTES
1. Have you been to the ER, urgent care clinic since your last visit?  Hospitalized since your last visit?Yes Where: [facility]/legs and feet swelling    2. Have you seen or consulted any other health care providers outside of the [facility] since your last visit?  Include any pap smears or colon screening. No     Chief Complaint   Patient presents with    Gastroesophageal Reflux    Hypertension    Follow-up           PHQ-9 Total Score: 0 (5/7/2025  1:04 PM)    
patient education was performed.The most recent lab findings were reviewed with the patient.    An After Visit Summary was printed and given to the patient.

## 2025-06-04 ENCOUNTER — OFFICE VISIT (OUTPATIENT)
Facility: CLINIC | Age: 65
End: 2025-06-04
Payer: MEDICARE

## 2025-06-04 VITALS
RESPIRATION RATE: 16 BRPM | SYSTOLIC BLOOD PRESSURE: 130 MMHG | WEIGHT: 185 LBS | TEMPERATURE: 98 F | HEIGHT: 70 IN | OXYGEN SATURATION: 96 % | HEART RATE: 72 BPM | DIASTOLIC BLOOD PRESSURE: 84 MMHG | BODY MASS INDEX: 26.48 KG/M2

## 2025-06-04 DIAGNOSIS — D50.8 OTHER IRON DEFICIENCY ANEMIA: ICD-10-CM

## 2025-06-04 DIAGNOSIS — K21.9 GASTRO-ESOPHAGEAL REFLUX DISEASE WITHOUT ESOPHAGITIS: ICD-10-CM

## 2025-06-04 DIAGNOSIS — I10 ESSENTIAL HYPERTENSION, BENIGN: Primary | ICD-10-CM

## 2025-06-04 DIAGNOSIS — M17.0 PRIMARY OSTEOARTHRITIS OF BOTH KNEES: ICD-10-CM

## 2025-06-04 PROBLEM — J84.9 INTERSTITIAL LUNG DISEASE (HCC): Status: ACTIVE | Noted: 2025-06-04

## 2025-06-04 PROCEDURE — 3017F COLORECTAL CA SCREEN DOC REV: CPT | Performed by: INTERNAL MEDICINE

## 2025-06-04 PROCEDURE — G8427 DOCREV CUR MEDS BY ELIG CLIN: HCPCS | Performed by: INTERNAL MEDICINE

## 2025-06-04 PROCEDURE — G8400 PT W/DXA NO RESULTS DOC: HCPCS | Performed by: INTERNAL MEDICINE

## 2025-06-04 PROCEDURE — 1090F PRES/ABSN URINE INCON ASSESS: CPT | Performed by: INTERNAL MEDICINE

## 2025-06-04 PROCEDURE — 1123F ACP DISCUSS/DSCN MKR DOCD: CPT | Performed by: INTERNAL MEDICINE

## 2025-06-04 PROCEDURE — 3079F DIAST BP 80-89 MM HG: CPT | Performed by: INTERNAL MEDICINE

## 2025-06-04 PROCEDURE — G8419 CALC BMI OUT NRM PARAM NOF/U: HCPCS | Performed by: INTERNAL MEDICINE

## 2025-06-04 PROCEDURE — 1036F TOBACCO NON-USER: CPT | Performed by: INTERNAL MEDICINE

## 2025-06-04 PROCEDURE — 3075F SYST BP GE 130 - 139MM HG: CPT | Performed by: INTERNAL MEDICINE

## 2025-06-04 PROCEDURE — 99214 OFFICE O/P EST MOD 30 MIN: CPT | Performed by: INTERNAL MEDICINE

## 2025-06-04 NOTE — PROGRESS NOTES
1. Have you been to the ER, urgent care clinic since your last visit?  Hospitalized since your last visit?No    2. Have you seen or consulted any other health care providers outside of the [facility] since your last visit?  Include any pap smears or colon screening. No     Chief Complaint   Patient presents with    Leg Swelling         No data recorded

## 2025-06-04 NOTE — PROGRESS NOTES
Dana Lopez is a 65 y.o. female and presents with Leg Swelling  .  Subjective:    Hypertension Review:  The patient has essential hypertension  Diet and Lifestyle: generally follows a  low sodium diet, exercises sporadically  Home BP Monitoring: is not measured at home.  Pertinent ROS: taking medications as instructed, no medication side effects noted, no TIA's, no chest pain on exertion, no dyspnea on exertion, no swelling of ankles.     Allergic Rhinitis  Patient presents for follow up evaluation of allergic symptoms. Denies any nasal congestion, rhinorrhea, sneezing, eye itching, watery eyes. Precipitants include pollen.  Treatment in the past has included intranasal steroids: .  Treatment currently includes intranasal steroids:  and are effective in the patient's opinion.    GERD Review:   Patient has a history of gastroesophageal reflux with heartburn. Symptoms have been present for a few months.  She denies dysphagia.  She  has not lost weight.  She denies melena, hematochezia, hematemesis, and coffee ground emesis.  This has been associated with fullness after meals.  She denies abdominal bloating and none.  Medical therapy in the past has included proton pump inhibitor    Lab Results   Component Value Date    WBC 2.6 (L) 04/20/2025    HGB 10.9 (L) 04/20/2025    HCT 34.2 (L) 04/20/2025     (L) 04/20/2025    CHOL 185 02/05/2025    TRIG 131 02/05/2025    HDL 41 02/05/2025    ALT 18 02/05/2025    AST 12 (L) 02/05/2025     05/07/2025    K 4.5 05/07/2025     05/07/2025    CREATININE 0.72 05/07/2025    BUN 7 05/07/2025    CO2 29 05/07/2025         Health maintenance suggests the needs for a test for occult blood    Anemia  Patient presents for  evaluation of anemia. Anemia was found by routine CBC.  It has been present for several months.  Associated signs & symptoms: fatigue        Review of Systems  Constitutional: negative for fevers, chills, anorexia and weight loss  Eyes:   negative

## 2025-06-05 LAB
ERYTHROCYTE [DISTWIDTH] IN BLOOD BY AUTOMATED COUNT: 14.1 % (ref 11.5–14.5)
FERRITIN SERPL-MCNC: 84 NG/ML (ref 8–252)
HCT VFR BLD AUTO: 37.2 % (ref 35–47)
HGB BLD-MCNC: 11.5 G/DL (ref 11.5–16)
IRON SATN MFR SERPL: 20 % (ref 20–50)
IRON SERPL-MCNC: 71 UG/DL (ref 35–150)
MCH RBC QN AUTO: 28.8 PG (ref 26–34)
MCHC RBC AUTO-ENTMCNC: 30.9 G/DL (ref 30–36.5)
MCV RBC AUTO: 93 FL (ref 80–99)
NRBC # BLD: 0 K/UL (ref 0–0.01)
NRBC BLD-RTO: 0 PER 100 WBC
PLATELET # BLD AUTO: 158 K/UL (ref 150–400)
PMV BLD AUTO: 11.5 FL (ref 8.9–12.9)
RBC # BLD AUTO: 4 M/UL (ref 3.8–5.2)
TIBC SERPL-MCNC: 349 UG/DL (ref 250–450)
WBC # BLD AUTO: 2.4 K/UL (ref 3.6–11)

## 2025-07-25 ENCOUNTER — TELEPHONE (OUTPATIENT)
Facility: CLINIC | Age: 65
End: 2025-07-25

## 2025-08-11 RX ORDER — CLONIDINE HYDROCHLORIDE 0.2 MG/1
0.2 TABLET ORAL NIGHTLY
Qty: 90 TABLET | Refills: 1 | Status: SHIPPED | OUTPATIENT
Start: 2025-08-11

## 2025-08-23 ENCOUNTER — HOSPITAL ENCOUNTER (EMERGENCY)
Facility: HOSPITAL | Age: 65
Discharge: HOME | End: 2025-08-24
Payer: MEDICARE

## 2025-08-23 DIAGNOSIS — R10.9 FLANK PAIN: Primary | ICD-10-CM

## 2025-08-23 PROCEDURE — 99284 EMERGENCY DEPT VISIT MOD MDM: CPT

## 2025-08-23 ASSESSMENT — PAIN DESCRIPTION - ORIENTATION: ORIENTATION: LEFT

## 2025-08-23 ASSESSMENT — LIFESTYLE VARIABLES
HOW MANY STANDARD DRINKS CONTAINING ALCOHOL DO YOU HAVE ON A TYPICAL DAY: 1 OR 2
HOW OFTEN DO YOU HAVE A DRINK CONTAINING ALCOHOL: MONTHLY OR LESS

## 2025-08-23 ASSESSMENT — PAIN - FUNCTIONAL ASSESSMENT: PAIN_FUNCTIONAL_ASSESSMENT: 0-10

## 2025-08-23 ASSESSMENT — PAIN DESCRIPTION - PAIN TYPE: TYPE: ACUTE PAIN

## 2025-08-23 ASSESSMENT — PAIN SCALES - GENERAL: PAINLEVEL_OUTOF10: 10

## 2025-08-23 ASSESSMENT — PAIN DESCRIPTION - LOCATION: LOCATION: BACK;FLANK

## 2025-08-23 ASSESSMENT — PAIN DESCRIPTION - DESCRIPTORS: DESCRIPTORS: PRESSURE

## 2025-08-24 ENCOUNTER — APPOINTMENT (OUTPATIENT)
Facility: HOSPITAL | Age: 65
End: 2025-08-24
Payer: MEDICARE

## 2025-08-24 VITALS
TEMPERATURE: 98.6 F | HEIGHT: 70 IN | BODY MASS INDEX: 25.6 KG/M2 | RESPIRATION RATE: 16 BRPM | SYSTOLIC BLOOD PRESSURE: 210 MMHG | DIASTOLIC BLOOD PRESSURE: 99 MMHG | OXYGEN SATURATION: 99 % | HEART RATE: 82 BPM | WEIGHT: 178.79 LBS

## 2025-08-24 LAB
ALBUMIN SERPL-MCNC: 3.7 G/DL (ref 3.5–5.2)
ALBUMIN/GLOB SERPL: 0.8 (ref 1.1–2.2)
ALP SERPL-CCNC: 120 U/L (ref 35–104)
ALT SERPL-CCNC: 14 U/L (ref 10–35)
ANION GAP SERPL CALC-SCNC: 12 MMOL/L (ref 2–14)
APPEARANCE UR: CLEAR
AST SERPL-CCNC: 21 U/L (ref 10–35)
BACTERIA URNS QL MICRO: NEGATIVE /HPF
BASOPHILS # BLD: 0.02 K/UL (ref 0–0.1)
BASOPHILS NFR BLD: 1 % (ref 0–1)
BILIRUB SERPL-MCNC: 0.5 MG/DL (ref 0–1.2)
BILIRUB UR QL: NEGATIVE
BUN SERPL-MCNC: 8 MG/DL (ref 8–23)
BUN/CREAT SERPL: 11 (ref 12–20)
CALCIUM SERPL-MCNC: 9.4 MG/DL (ref 8.8–10.2)
CHLORIDE SERPL-SCNC: 103 MMOL/L (ref 98–107)
CO2 SERPL-SCNC: 26 MMOL/L (ref 20–29)
COLOR UR: NORMAL
CREAT SERPL-MCNC: 0.74 MG/DL (ref 0.6–1)
DIFFERENTIAL METHOD BLD: ABNORMAL
EOSINOPHIL # BLD: 0.1 K/UL (ref 0–0.4)
EOSINOPHIL NFR BLD: 4 % (ref 0–7)
EPITH CASTS URNS QL MICRO: NORMAL /LPF
ERYTHROCYTE [DISTWIDTH] IN BLOOD BY AUTOMATED COUNT: 14.6 % (ref 11.5–14.5)
GLOBULIN SER CALC-MCNC: 4.5 G/DL (ref 2–4)
GLUCOSE SERPL-MCNC: 123 MG/DL (ref 65–100)
GLUCOSE UR STRIP.AUTO-MCNC: NEGATIVE MG/DL
HCT VFR BLD AUTO: 34.9 % (ref 35–47)
HGB BLD-MCNC: 11.1 G/DL (ref 11.5–16)
HGB UR QL STRIP: NEGATIVE
HYALINE CASTS URNS QL MICRO: NORMAL /LPF (ref 0–2)
IMM GRANULOCYTES # BLD AUTO: 0 K/UL (ref 0–0.04)
IMM GRANULOCYTES NFR BLD AUTO: 0 % (ref 0–0.5)
KETONES UR QL STRIP.AUTO: NEGATIVE MG/DL
LEUKOCYTE ESTERASE UR QL STRIP.AUTO: NEGATIVE
LYMPHOCYTES # BLD: 1.03 K/UL (ref 0.8–3.5)
LYMPHOCYTES NFR BLD: 43 % (ref 12–49)
MCH RBC QN AUTO: 28.1 PG (ref 26–34)
MCHC RBC AUTO-ENTMCNC: 31.8 G/DL (ref 30–36.5)
MCV RBC AUTO: 88.4 FL (ref 80–99)
MONOCYTES # BLD: 0.17 K/UL (ref 0–1)
MONOCYTES NFR BLD: 7 % (ref 5–13)
NEUTS SEG # BLD: 1.08 K/UL (ref 1.8–8)
NEUTS SEG NFR BLD: 45 % (ref 32–75)
NITRITE UR QL STRIP.AUTO: NEGATIVE
NRBC # BLD: 0 K/UL (ref 0–0.01)
NRBC BLD-RTO: 0 PER 100 WBC
PH UR STRIP: 6 (ref 5–8)
PLATELET # BLD AUTO: 140 K/UL (ref 150–400)
PMV BLD AUTO: 10.8 FL (ref 8.9–12.9)
POTASSIUM SERPL-SCNC: 3.4 MMOL/L (ref 3.5–5.1)
PROT SERPL-MCNC: 8.2 G/DL (ref 6.4–8.3)
PROT UR STRIP-MCNC: NEGATIVE MG/DL
RBC # BLD AUTO: 3.95 M/UL (ref 3.8–5.2)
RBC #/AREA URNS HPF: NORMAL /HPF (ref 0–5)
RBC MORPH BLD: ABNORMAL
SODIUM SERPL-SCNC: 141 MMOL/L (ref 136–145)
SP GR UR REFRACTOMETRY: 1.01
URINE CULTURE IF INDICATED: NORMAL
UROBILINOGEN UR QL STRIP.AUTO: 0.2 EU/DL (ref 0.2–1)
WBC # BLD AUTO: 2.4 K/UL (ref 3.6–11)
WBC MORPH BLD: ABNORMAL
WBC URNS QL MICRO: NORMAL /HPF (ref 0–4)

## 2025-08-24 PROCEDURE — 74176 CT ABD & PELVIS W/O CONTRAST: CPT

## 2025-08-24 PROCEDURE — 85025 COMPLETE CBC W/AUTO DIFF WBC: CPT

## 2025-08-24 PROCEDURE — 96374 THER/PROPH/DIAG INJ IV PUSH: CPT

## 2025-08-24 PROCEDURE — 81001 URINALYSIS AUTO W/SCOPE: CPT

## 2025-08-24 PROCEDURE — 36415 COLL VENOUS BLD VENIPUNCTURE: CPT

## 2025-08-24 PROCEDURE — 6360000002 HC RX W HCPCS

## 2025-08-24 PROCEDURE — 80053 COMPREHEN METABOLIC PANEL: CPT

## 2025-08-24 PROCEDURE — 6370000000 HC RX 637 (ALT 250 FOR IP)

## 2025-08-24 RX ORDER — AMLODIPINE BESYLATE 5 MG/1
10 TABLET ORAL
Status: COMPLETED | OUTPATIENT
Start: 2025-08-24 | End: 2025-08-24

## 2025-08-24 RX ORDER — LOSARTAN POTASSIUM 100 MG/1
100 TABLET ORAL ONCE
Status: COMPLETED | OUTPATIENT
Start: 2025-08-24 | End: 2025-08-24

## 2025-08-24 RX ORDER — KETOROLAC TROMETHAMINE 30 MG/ML
15 INJECTION, SOLUTION INTRAMUSCULAR; INTRAVENOUS ONCE
Status: COMPLETED | OUTPATIENT
Start: 2025-08-24 | End: 2025-08-24

## 2025-08-24 RX ADMIN — KETOROLAC TROMETHAMINE 15 MG: 30 INJECTION, SOLUTION INTRAMUSCULAR at 02:13

## 2025-08-24 RX ADMIN — LOSARTAN POTASSIUM 100 MG: 100 TABLET, FILM COATED ORAL at 02:13

## 2025-08-24 RX ADMIN — AMLODIPINE BESYLATE 10 MG: 5 TABLET ORAL at 02:12

## 2025-08-24 ASSESSMENT — PAIN DESCRIPTION - LOCATION: LOCATION: BACK

## 2025-08-24 ASSESSMENT — PAIN DESCRIPTION - ORIENTATION: ORIENTATION: LEFT

## 2025-08-24 ASSESSMENT — PAIN DESCRIPTION - DESCRIPTORS: DESCRIPTORS: ACHING

## 2025-08-24 ASSESSMENT — PAIN SCALES - GENERAL: PAINLEVEL_OUTOF10: 8

## 2025-08-24 ASSESSMENT — PAIN - FUNCTIONAL ASSESSMENT: PAIN_FUNCTIONAL_ASSESSMENT: 0-10

## 2025-08-27 ENCOUNTER — OFFICE VISIT (OUTPATIENT)
Facility: CLINIC | Age: 65
End: 2025-08-27

## 2025-08-27 VITALS
RESPIRATION RATE: 19 BRPM | OXYGEN SATURATION: 95 % | SYSTOLIC BLOOD PRESSURE: 140 MMHG | TEMPERATURE: 98 F | DIASTOLIC BLOOD PRESSURE: 80 MMHG | HEART RATE: 80 BPM | BODY MASS INDEX: 25.48 KG/M2 | WEIGHT: 178 LBS | HEIGHT: 70 IN

## 2025-08-27 DIAGNOSIS — I10 ESSENTIAL HYPERTENSION, BENIGN: Primary | ICD-10-CM

## 2025-08-27 DIAGNOSIS — M47.817 LUMBAR AND SACRAL OSTEOARTHRITIS: ICD-10-CM

## 2025-08-27 RX ORDER — AMLODIPINE BESYLATE 10 MG/1
10 TABLET ORAL DAILY
Status: SHIPPED | COMMUNITY
Start: 2025-08-27

## 2025-08-27 RX ORDER — BUMETANIDE 1 MG/1
1 TABLET ORAL DAILY
Status: SHIPPED | COMMUNITY
Start: 2025-08-27

## 2025-08-27 RX ORDER — PREDNISONE 5 MG/1
TABLET ORAL
Qty: 21 EACH | Refills: 1 | Status: SHIPPED | OUTPATIENT
Start: 2025-08-27

## 2025-08-27 SDOH — ECONOMIC STABILITY: FOOD INSECURITY: WITHIN THE PAST 12 MONTHS, YOU WORRIED THAT YOUR FOOD WOULD RUN OUT BEFORE YOU GOT MONEY TO BUY MORE.: NEVER TRUE

## 2025-08-27 SDOH — ECONOMIC STABILITY: FOOD INSECURITY: WITHIN THE PAST 12 MONTHS, THE FOOD YOU BOUGHT JUST DIDN'T LAST AND YOU DIDN'T HAVE MONEY TO GET MORE.: NEVER TRUE

## 2025-08-27 ASSESSMENT — PATIENT HEALTH QUESTIONNAIRE - PHQ9
SUM OF ALL RESPONSES TO PHQ QUESTIONS 1-9: 0
1. LITTLE INTEREST OR PLEASURE IN DOING THINGS: NOT AT ALL
SUM OF ALL RESPONSES TO PHQ QUESTIONS 1-9: 0
2. FEELING DOWN, DEPRESSED OR HOPELESS: NOT AT ALL

## 2025-09-02 ENCOUNTER — HOSPITAL ENCOUNTER (OUTPATIENT)
Facility: HOSPITAL | Age: 65
Discharge: HOME | End: 2025-09-05
Payer: MEDICARE

## 2025-09-02 DIAGNOSIS — M47.817 LUMBAR AND SACRAL OSTEOARTHRITIS: ICD-10-CM

## 2025-09-02 PROCEDURE — 72110 X-RAY EXAM L-2 SPINE 4/>VWS: CPT
